# Patient Record
Sex: MALE | Race: WHITE | NOT HISPANIC OR LATINO | URBAN - METROPOLITAN AREA
[De-identification: names, ages, dates, MRNs, and addresses within clinical notes are randomized per-mention and may not be internally consistent; named-entity substitution may affect disease eponyms.]

---

## 2017-08-11 ENCOUNTER — INPATIENT (INPATIENT)
Facility: HOSPITAL | Age: 82
LOS: 3 days | Discharge: ANOTHER HOSPITAL | DRG: 305 | End: 2017-08-15
Attending: INTERNAL MEDICINE | Admitting: INTERNAL MEDICINE
Payer: MEDICARE

## 2017-08-11 VITALS
TEMPERATURE: 98 F | HEIGHT: 72 IN | HEART RATE: 62 BPM | RESPIRATION RATE: 18 BRPM | SYSTOLIC BLOOD PRESSURE: 160 MMHG | WEIGHT: 257.06 LBS | DIASTOLIC BLOOD PRESSURE: 77 MMHG | OXYGEN SATURATION: 98 %

## 2017-08-11 DIAGNOSIS — R06.09 OTHER FORMS OF DYSPNEA: ICD-10-CM

## 2017-08-11 DIAGNOSIS — I10 ESSENTIAL (PRIMARY) HYPERTENSION: ICD-10-CM

## 2017-08-11 LAB
ALBUMIN SERPL ELPH-MCNC: 3.6 G/DL — SIGNIFICANT CHANGE UP (ref 3.5–5)
ALP SERPL-CCNC: 74 U/L — SIGNIFICANT CHANGE UP (ref 40–120)
ALT FLD-CCNC: 20 U/L DA — SIGNIFICANT CHANGE UP (ref 10–60)
ANION GAP SERPL CALC-SCNC: 5 MMOL/L — SIGNIFICANT CHANGE UP (ref 5–17)
APPEARANCE UR: CLEAR — SIGNIFICANT CHANGE UP
AST SERPL-CCNC: 24 U/L — SIGNIFICANT CHANGE UP (ref 10–40)
BILIRUB SERPL-MCNC: 0.5 MG/DL — SIGNIFICANT CHANGE UP (ref 0.2–1.2)
BILIRUB UR-MCNC: NEGATIVE — SIGNIFICANT CHANGE UP
BUN SERPL-MCNC: 18 MG/DL — SIGNIFICANT CHANGE UP (ref 7–18)
CALCIUM SERPL-MCNC: 8.7 MG/DL — SIGNIFICANT CHANGE UP (ref 8.4–10.5)
CHLORIDE SERPL-SCNC: 108 MMOL/L — SIGNIFICANT CHANGE UP (ref 96–108)
CO2 SERPL-SCNC: 27 MMOL/L — SIGNIFICANT CHANGE UP (ref 22–31)
COLOR SPEC: YELLOW — SIGNIFICANT CHANGE UP
CREAT SERPL-MCNC: 1.42 MG/DL — HIGH (ref 0.5–1.3)
D DIMER BLD IA.RAPID-MCNC: 208 NG/ML DDU — SIGNIFICANT CHANGE UP
DIFF PNL FLD: NEGATIVE — SIGNIFICANT CHANGE UP
GLUCOSE SERPL-MCNC: 130 MG/DL — HIGH (ref 70–99)
GLUCOSE UR QL: NEGATIVE — SIGNIFICANT CHANGE UP
HCT VFR BLD CALC: 37.8 % — LOW (ref 39–50)
HGB BLD-MCNC: 12.4 G/DL — LOW (ref 13–17)
KETONES UR-MCNC: NEGATIVE — SIGNIFICANT CHANGE UP
LEUKOCYTE ESTERASE UR-ACNC: ABNORMAL
MCHC RBC-ENTMCNC: 30.2 PG — SIGNIFICANT CHANGE UP (ref 27–34)
MCHC RBC-ENTMCNC: 32.8 GM/DL — SIGNIFICANT CHANGE UP (ref 32–36)
MCV RBC AUTO: 92.2 FL — SIGNIFICANT CHANGE UP (ref 80–100)
NITRITE UR-MCNC: NEGATIVE — SIGNIFICANT CHANGE UP
NT-PROBNP SERPL-SCNC: 549 PG/ML — HIGH (ref 0–450)
PH UR: 6.5 — SIGNIFICANT CHANGE UP (ref 5–8)
PLATELET # BLD AUTO: 172 K/UL — SIGNIFICANT CHANGE UP (ref 150–400)
POTASSIUM SERPL-MCNC: 4.3 MMOL/L — SIGNIFICANT CHANGE UP (ref 3.5–5.3)
POTASSIUM SERPL-SCNC: 4.3 MMOL/L — SIGNIFICANT CHANGE UP (ref 3.5–5.3)
PROT SERPL-MCNC: 7.2 G/DL — SIGNIFICANT CHANGE UP (ref 6–8.3)
PROT UR-MCNC: NEGATIVE — SIGNIFICANT CHANGE UP
RBC # BLD: 4.1 M/UL — LOW (ref 4.2–5.8)
RBC # FLD: 12.9 % — SIGNIFICANT CHANGE UP (ref 10.3–14.5)
SODIUM SERPL-SCNC: 140 MMOL/L — SIGNIFICANT CHANGE UP (ref 135–145)
SP GR SPEC: 1.01 — SIGNIFICANT CHANGE UP (ref 1.01–1.02)
TROPONIN I SERPL-MCNC: 0.02 NG/ML — SIGNIFICANT CHANGE UP (ref 0–0.04)
TROPONIN I SERPL-MCNC: 0.02 NG/ML — SIGNIFICANT CHANGE UP (ref 0–0.04)
UROBILINOGEN FLD QL: NEGATIVE — SIGNIFICANT CHANGE UP
WBC # BLD: 6.8 K/UL — SIGNIFICANT CHANGE UP (ref 3.8–10.5)
WBC # FLD AUTO: 6.8 K/UL — SIGNIFICANT CHANGE UP (ref 3.8–10.5)

## 2017-08-11 PROCEDURE — 71020: CPT | Mod: 26

## 2017-08-11 PROCEDURE — 99285 EMERGENCY DEPT VISIT HI MDM: CPT

## 2017-08-11 RX ORDER — DEXTROSE 50 % IN WATER 50 %
25 SYRINGE (ML) INTRAVENOUS ONCE
Qty: 0 | Refills: 0 | Status: DISCONTINUED | OUTPATIENT
Start: 2017-08-11 | End: 2017-08-15

## 2017-08-11 RX ORDER — INSULIN LISPRO 100/ML
VIAL (ML) SUBCUTANEOUS
Qty: 0 | Refills: 0 | Status: DISCONTINUED | OUTPATIENT
Start: 2017-08-11 | End: 2017-08-12

## 2017-08-11 RX ORDER — SODIUM CHLORIDE 9 MG/ML
1000 INJECTION INTRAMUSCULAR; INTRAVENOUS; SUBCUTANEOUS ONCE
Qty: 0 | Refills: 0 | Status: COMPLETED | OUTPATIENT
Start: 2017-08-11 | End: 2017-08-11

## 2017-08-11 RX ORDER — DEXTROSE 50 % IN WATER 50 %
1 SYRINGE (ML) INTRAVENOUS ONCE
Qty: 0 | Refills: 0 | Status: DISCONTINUED | OUTPATIENT
Start: 2017-08-11 | End: 2017-08-15

## 2017-08-11 RX ORDER — SODIUM CHLORIDE 9 MG/ML
1000 INJECTION, SOLUTION INTRAVENOUS
Qty: 0 | Refills: 0 | Status: DISCONTINUED | OUTPATIENT
Start: 2017-08-11 | End: 2017-08-15

## 2017-08-11 RX ORDER — FUROSEMIDE 40 MG
40 TABLET ORAL ONCE
Qty: 0 | Refills: 0 | Status: COMPLETED | OUTPATIENT
Start: 2017-08-11 | End: 2017-08-11

## 2017-08-11 RX ORDER — DEXTROSE 50 % IN WATER 50 %
12.5 SYRINGE (ML) INTRAVENOUS ONCE
Qty: 0 | Refills: 0 | Status: DISCONTINUED | OUTPATIENT
Start: 2017-08-11 | End: 2017-08-15

## 2017-08-11 RX ORDER — GLUCAGON INJECTION, SOLUTION 0.5 MG/.1ML
1 INJECTION, SOLUTION SUBCUTANEOUS ONCE
Qty: 0 | Refills: 0 | Status: DISCONTINUED | OUTPATIENT
Start: 2017-08-11 | End: 2017-08-15

## 2017-08-11 RX ADMIN — SODIUM CHLORIDE 4000 MILLILITER(S): 9 INJECTION INTRAMUSCULAR; INTRAVENOUS; SUBCUTANEOUS at 13:20

## 2017-08-11 NOTE — H&P ADULT - HISTORY OF PRESENT ILLNESS
82 year old Male from home lives alone, ambulates independently, with PMH Diabetes, CAD S/p stent? CABG? in Nov 2011, Obesity, OA, macular degeneration came with c/o SOB on exertion since one month ago. He reports that He become SOB walking one block,  His exertional SOB was progressively getting worse since one week, so he called his physician who told him to go to ER. Dyspnea is aggravated by lifting grocery bags and walking more then a block, relieved by sitting down. He denies Orthopnea, PND, chest pain, dizziness, recent URI, heartburn, fever. He also c/o chronic B/L leg swelling and knee pain due to Osteoarthritis. He reported that he goes to his cardiologist Dr. Rose olivera in [facility] who scheduled him for Nuclear Stress test but he cancelled that test as it was expensive. He did not know whether his cardiologist has done an Echo and EF. In ED, EKG was done which shows NS-T wave changes with occasional PVC. CXR was done "Focal opacity in the periphery of the left lower lobe along the lateral aspect of the left hemidiaphragm not well visualized on the lateral image which could represent focal scarring/atelectasis. Otherwise, clear lungs."   When seen in ED he was lying comfortably, He denies SOB currently.

## 2017-08-11 NOTE — ED ADULT NURSE NOTE - OBJECTIVE STATEMENT
pt came in ambulatory  with c/o generalized weakness with shortness of breath pt alert oriented x 3 with no signs of any discomfort wife on bedside with equal chest expansion noted. seen byMD with orders made and carried out pt came in ambulatory  with c/o generalized weakness with shortness of breath pt alert oriented x 3 with no signs of any discomfort  with equal chest expansion noted. seen byMD with orders made and carried out

## 2017-08-11 NOTE — ED PROVIDER NOTE - OBJECTIVE STATEMENT
83 y/o male with PMHx of HLD, DM, and Obesity presents to the ED c/o 1 week SOB on exertion. Pt has not had a stress test. Pt denies infectious symptoms, fever, chills, CP, cough, diaphoresis, or any other complaints. NKDA. PMD Dr. Hernandez. 83 y/o male with PMHx of HLD, DM, and Obesity presents to the ED c/o 1 week SOB on exertion. No chest pain.  Pt has not had a stress test. Requests to be assigned to new cardiologist.  Pt denies infectious symptoms, fever, chills, CP, cough, diaphoresis, or any other complaints. NKDA. PMD Dr. Hernandez.

## 2017-08-11 NOTE — H&P ADULT - NSHPPHYSICALEXAM_GEN_ALL_CORE
Vital Signs Last 24 Hrs  T(C): 36.5 (11 Aug 2017 22:43), Max: 36.9 (11 Aug 2017 12:21)  T(F): 97.7 (11 Aug 2017 22:43), Max: 98.5 (11 Aug 2017 12:21)  HR: 54 (11 Aug 2017 22:43) (54 - 66)  BP: 147/75 (11 Aug 2017 22:43) (147/75 - 162/86)  BP(mean): --  RR: 17 (11 Aug 2017 22:43) (17 - 18)  SpO2: 98% (11 Aug 2017 22:43) (97% - 98%)

## 2017-08-11 NOTE — H&P ADULT - ASSESSMENT
82 year old Male from home lives alone, ambulates independently, with PMH Diabetes, CAD S/p stent? CABG? in Nov 2011, Obesity, OA, macular degeneration came with c/o SOB on exertion since one month ago.  In ED, EKG was done which shows NS-T wave changes with occasional PVC. CXR was done "Focal opacity in the periphery of the left lower lobe along the lateral aspect of the left hemidiaphragm not well visualized on the lateral image which could represent focal scarring/atelectasis. Otherwise, clear lungs." When seen in ED he was lying comfortably, He denies SOB currently. Admitted to Telemetry to R/O MI given patient's risk factor of CAD and CHF

## 2017-08-11 NOTE — ED ADULT NURSE REASSESSMENT NOTE - NS ED NURSE REASSESS COMMENT FT1
received pt aox3 not in any distress
1950- pt states he is feeling better denies any shortness of breath at this time. denies any cp nor discomfort

## 2017-08-11 NOTE — H&P ADULT - PROBLEM SELECTOR PLAN 1
-Likely due to Heart failure/ Ischemic Heart Disease.   -ProBNP is 549, Trop x 2 negative. Will trend Troponin,   -EKG: NST- Twave changes, some PVCS,  f/u B12, TSH  -D/dimers negative, U/S Lower extremity for DVT pending   consulted

## 2017-08-11 NOTE — ED PROVIDER NOTE - MEDICAL DECISION MAKING DETAILS
dyspnea on exertion, mild elev in bnp  trop neg. will admit for possible stress test, echo and further mgmt

## 2017-08-12 DIAGNOSIS — R06.09 OTHER FORMS OF DYSPNEA: ICD-10-CM

## 2017-08-12 DIAGNOSIS — E11.9 TYPE 2 DIABETES MELLITUS WITHOUT COMPLICATIONS: ICD-10-CM

## 2017-08-12 DIAGNOSIS — E78.5 HYPERLIPIDEMIA, UNSPECIFIED: ICD-10-CM

## 2017-08-12 DIAGNOSIS — I10 ESSENTIAL (PRIMARY) HYPERTENSION: ICD-10-CM

## 2017-08-12 DIAGNOSIS — Z29.9 ENCOUNTER FOR PROPHYLACTIC MEASURES, UNSPECIFIED: ICD-10-CM

## 2017-08-12 LAB
ANION GAP SERPL CALC-SCNC: 9 MMOL/L — SIGNIFICANT CHANGE UP (ref 5–17)
BUN SERPL-MCNC: 18 MG/DL — SIGNIFICANT CHANGE UP (ref 7–18)
CALCIUM SERPL-MCNC: 9.3 MG/DL — SIGNIFICANT CHANGE UP (ref 8.4–10.5)
CHLORIDE SERPL-SCNC: 108 MMOL/L — SIGNIFICANT CHANGE UP (ref 96–108)
CO2 SERPL-SCNC: 25 MMOL/L — SIGNIFICANT CHANGE UP (ref 22–31)
CREAT SERPL-MCNC: 1.28 MG/DL — SIGNIFICANT CHANGE UP (ref 0.5–1.3)
GLUCOSE SERPL-MCNC: 107 MG/DL — HIGH (ref 70–99)
HBA1C BLD-MCNC: 6.4 % — HIGH (ref 4–5.6)
HCT VFR BLD CALC: 38 % — LOW (ref 39–50)
HGB BLD-MCNC: 12.5 G/DL — LOW (ref 13–17)
MAGNESIUM SERPL-MCNC: 2.2 MG/DL — SIGNIFICANT CHANGE UP (ref 1.6–2.6)
MCHC RBC-ENTMCNC: 30.4 PG — SIGNIFICANT CHANGE UP (ref 27–34)
MCHC RBC-ENTMCNC: 33 GM/DL — SIGNIFICANT CHANGE UP (ref 32–36)
MCV RBC AUTO: 92.2 FL — SIGNIFICANT CHANGE UP (ref 80–100)
OSMOLALITY UR: 442 MOS/KG — SIGNIFICANT CHANGE UP (ref 50–1200)
PHOSPHATE SERPL-MCNC: 3.3 MG/DL — SIGNIFICANT CHANGE UP (ref 2.5–4.5)
PLATELET # BLD AUTO: 172 K/UL — SIGNIFICANT CHANGE UP (ref 150–400)
POTASSIUM SERPL-MCNC: 4 MMOL/L — SIGNIFICANT CHANGE UP (ref 3.5–5.3)
POTASSIUM SERPL-SCNC: 4 MMOL/L — SIGNIFICANT CHANGE UP (ref 3.5–5.3)
RBC # BLD: 4.12 M/UL — LOW (ref 4.2–5.8)
RBC # FLD: 12.7 % — SIGNIFICANT CHANGE UP (ref 10.3–14.5)
SODIUM SERPL-SCNC: 142 MMOL/L — SIGNIFICANT CHANGE UP (ref 135–145)
SODIUM UR-SCNC: 82 MMOL/L — SIGNIFICANT CHANGE UP (ref 40–220)
TROPONIN I SERPL-MCNC: 0.03 NG/ML — SIGNIFICANT CHANGE UP (ref 0–0.04)
TSH SERPL-MCNC: 2.79 UU/ML — SIGNIFICANT CHANGE UP (ref 0.34–4.82)
VIT B12 SERPL-MCNC: 356 PG/ML — SIGNIFICANT CHANGE UP (ref 243–894)
WBC # BLD: 7.7 K/UL — SIGNIFICANT CHANGE UP (ref 3.8–10.5)
WBC # FLD AUTO: 7.7 K/UL — SIGNIFICANT CHANGE UP (ref 3.8–10.5)

## 2017-08-12 PROCEDURE — 93970 EXTREMITY STUDY: CPT | Mod: 26

## 2017-08-12 RX ORDER — LOSARTAN POTASSIUM 100 MG/1
50 TABLET, FILM COATED ORAL DAILY
Qty: 0 | Refills: 0 | Status: DISCONTINUED | OUTPATIENT
Start: 2017-08-12 | End: 2017-08-15

## 2017-08-12 RX ORDER — ATORVASTATIN CALCIUM 80 MG/1
40 TABLET, FILM COATED ORAL AT BEDTIME
Qty: 0 | Refills: 0 | Status: DISCONTINUED | OUTPATIENT
Start: 2017-08-12 | End: 2017-08-15

## 2017-08-12 RX ORDER — INSULIN GLARGINE 100 [IU]/ML
10 INJECTION, SOLUTION SUBCUTANEOUS AT BEDTIME
Qty: 0 | Refills: 0 | Status: DISCONTINUED | OUTPATIENT
Start: 2017-08-12 | End: 2017-08-15

## 2017-08-12 RX ORDER — LOSARTAN POTASSIUM 100 MG/1
25 TABLET, FILM COATED ORAL DAILY
Qty: 0 | Refills: 0 | Status: DISCONTINUED | OUTPATIENT
Start: 2017-08-12 | End: 2017-08-12

## 2017-08-12 RX ORDER — SIMVASTATIN 20 MG/1
10 TABLET, FILM COATED ORAL AT BEDTIME
Qty: 0 | Refills: 0 | Status: DISCONTINUED | OUTPATIENT
Start: 2017-08-12 | End: 2017-08-12

## 2017-08-12 RX ORDER — ISOSORBIDE MONONITRATE 60 MG/1
30 TABLET, EXTENDED RELEASE ORAL DAILY
Qty: 0 | Refills: 0 | Status: DISCONTINUED | OUTPATIENT
Start: 2017-08-12 | End: 2017-08-15

## 2017-08-12 RX ORDER — ASPIRIN/CALCIUM CARB/MAGNESIUM 324 MG
81 TABLET ORAL DAILY
Qty: 0 | Refills: 0 | Status: DISCONTINUED | OUTPATIENT
Start: 2017-08-12 | End: 2017-08-15

## 2017-08-12 RX ORDER — HYDROCHLOROTHIAZIDE 25 MG
25 TABLET ORAL DAILY
Qty: 0 | Refills: 0 | Status: DISCONTINUED | OUTPATIENT
Start: 2017-08-12 | End: 2017-08-15

## 2017-08-12 RX ORDER — INSULIN LISPRO 100/ML
VIAL (ML) SUBCUTANEOUS
Qty: 0 | Refills: 0 | Status: DISCONTINUED | OUTPATIENT
Start: 2017-08-12 | End: 2017-08-15

## 2017-08-12 RX ORDER — ENOXAPARIN SODIUM 100 MG/ML
40 INJECTION SUBCUTANEOUS DAILY
Qty: 0 | Refills: 0 | Status: DISCONTINUED | OUTPATIENT
Start: 2017-08-12 | End: 2017-08-15

## 2017-08-12 RX ADMIN — Medication 81 MILLIGRAM(S): at 13:13

## 2017-08-12 RX ADMIN — ENOXAPARIN SODIUM 40 MILLIGRAM(S): 100 INJECTION SUBCUTANEOUS at 13:13

## 2017-08-12 RX ADMIN — INSULIN GLARGINE 10 UNIT(S): 100 INJECTION, SOLUTION SUBCUTANEOUS at 21:36

## 2017-08-12 RX ADMIN — ISOSORBIDE MONONITRATE 30 MILLIGRAM(S): 60 TABLET, EXTENDED RELEASE ORAL at 13:13

## 2017-08-12 RX ADMIN — ATORVASTATIN CALCIUM 40 MILLIGRAM(S): 80 TABLET, FILM COATED ORAL at 21:36

## 2017-08-12 RX ADMIN — LOSARTAN POTASSIUM 25 MILLIGRAM(S): 100 TABLET, FILM COATED ORAL at 05:54

## 2017-08-12 NOTE — CONSULT NOTE ADULT - SUBJECTIVE AND OBJECTIVE BOX
5N: CARD ATTEND CONSULT  8:30AM      Orig  HPI:  82 year old Male from home lives alone, ambulates independently, with PMH Diabetes, CAD S/p CABG? in 2011, [place],  came with c/o SOB on exertion since one month ago. His exertional SOB was progressively getting worse since one week, so he called his physician who told him to go to ER. Dyspnea is aggravated by lifting grocery bags and walking more then a block, relieved by sitting down. He denies Orthopnea, PND, chest pain, dizziness, syncope. He also c/o chronic B/L leg swelling. He reported that he goes to his cardiologist Dr. Graff who scheduled him for Nuclear Stress test but he cancelled that test as it was expensive.   When seen in ED he was lying comfortably, He denies SOB currently. (11 Aug 2017 23:28)    PMH: ASHD, HTN, IDDM, BPH    PRE-ADM MEDS:  Losartan, Simvastatin, Insulin, ASA    SURGERIES: CABG    ROS: non-contributory except as outlined above.      EXAM: alert , in NAD    Vital Signs Last 24 Hrs  T(C): 37 (12 Aug 2017 07:38), Max: 37.2 (12 Aug 2017 04:38)  T(F): 98.6 (12 Aug 2017 07:38), Max: 99 (12 Aug 2017 04:38)  HR: 60 (12 Aug 2017 07:40) (54 - 66)  BP: 186/77 (12 Aug 2017 07:40) (132/60 - 186/77)  RR: 16 (12 Aug 2017 07:38) (16 - 18)  SpO2: 97% (12 Aug 2017 07:38) (97% - 99%)  Daily Height in cm: 182.88 (11 Aug 2017 12:21)    Daily Weight in k.9 (12 Aug 2017 04:38)    NECK: no JVD/HJR, nl c. pulses w/o bruits LUNGS: clear  COR: nl s1/s2, distant 2/6 KATELYN over AV, no g, rub  ABD: nl BS, soft, not tender, no oranomeg/masses  LE: bilat trace ankle/pedal edema, peripheral pulses not palpable    EKG:  NSR, one PVC, no ischemic ST-T changes.    CM:  NSR, no ectopy    RAD RESULTS:  CxR: < from: Xray Chest 2 Views PA/Lat (17 @ 15:15) >  Focal opacity in the periphery of the left lower lobe along the lateral   aspect of the left hemidiaphragm not well visualizedon the lateral image   which could represent focal scarring/atelectasis. Otherwise, clear lungs.      LABS:                         12.5   7.7   )-----------( 172      ( 12 Aug 2017 07:23 )             38.0     08-12    142  |  108  |  18  ----------------------------<  107<H>  4.0   |  25  |  1.28    Ca    9.3      12 Aug 2017 07:23  Phos  3.3     08-12  Mg     2.2     -12    TPro  7.2  /  Alb  3.6  /  TBili  0.5  /  DBili  x   /  AST  24  /  ALT  20  /  AlkPhos  74  08-11    CARDIAC MARKERS ( 12 Aug 2017 07:23 )  0.028 ng/mL / x     / x     / x     / x      CARDIAC MARKERS ( 11 Aug 2017 22:01 )  0.023 ng/mL / x     / x     / x     / x      CARDIAC MARKERS ( 11 Aug 2017 13:32 )  0.017 ng/mL / x     / x     / x     / x

## 2017-08-12 NOTE — CONSULT NOTE ADULT - ASSESSMENT
1. Exertional sx sukggestive of coronary insufficiency,.  2. Established CAD, s/p CABG >> so far by serial enzymes no indication ACS.  3. Hypertensive  4. By CxR, pro-BNP not in clinical CHF.  5. AODM    Plan:    1. BP control >> Losartan, diuretic.  2. Anti -ischemic long acting nitrates, eventually low dose beta blocker.  3. More aggressive statin dosing, ASA.  4. 2Decho  5. Will probably need diagnostic pharm nuclear ST during this adm  6. The house staff will attempt to contact pt's outside cardiologist.    MEDICATIONS  (STANDING):  dextrose 5%. 1000 milliLiter(s) (50 mL/Hr) IV Continuous <Continuous>  dextrose 50% Injectable 12.5 Gram(s) IV Push once  dextrose 50% Injectable 25 Gram(s) IV Push once  dextrose 50% Injectable 25 Gram(s) IV Push once  losartan 50 milliGRAM(s) Oral daily  insulin glargine Injectable (LANTUS) 10 Unit(s) SubCutaneous at bedtime  insulin lispro (HumaLOG) corrective regimen sliding scale   SubCutaneous three times a day before meals  aspirin enteric coated 81 milliGRAM(s) Oral daily  enoxaparin Injectable 40 milliGRAM(s) SubCutaneous daily  isosorbide   mononitrate ER Tablet (IMDUR) 30 milliGRAM(s) Oral daily  hydrochlorothiazide 25 milliGRAM(s) Oral daily  atorvastatin 40 milliGRAM(s) Oral at bedtime

## 2017-08-13 LAB
ANION GAP SERPL CALC-SCNC: 8 MMOL/L — SIGNIFICANT CHANGE UP (ref 5–17)
BUN SERPL-MCNC: 19 MG/DL — HIGH (ref 7–18)
CALCIUM SERPL-MCNC: 8.8 MG/DL — SIGNIFICANT CHANGE UP (ref 8.4–10.5)
CHLORIDE SERPL-SCNC: 108 MMOL/L — SIGNIFICANT CHANGE UP (ref 96–108)
CO2 SERPL-SCNC: 26 MMOL/L — SIGNIFICANT CHANGE UP (ref 22–31)
CREAT SERPL-MCNC: 1.23 MG/DL — SIGNIFICANT CHANGE UP (ref 0.5–1.3)
GLUCOSE SERPL-MCNC: 112 MG/DL — HIGH (ref 70–99)
HCT VFR BLD CALC: 36.1 % — LOW (ref 39–50)
HGB BLD-MCNC: 11.7 G/DL — LOW (ref 13–17)
MAGNESIUM SERPL-MCNC: 2.3 MG/DL — SIGNIFICANT CHANGE UP (ref 1.6–2.6)
MCHC RBC-ENTMCNC: 29.3 PG — SIGNIFICANT CHANGE UP (ref 27–34)
MCHC RBC-ENTMCNC: 32.4 GM/DL — SIGNIFICANT CHANGE UP (ref 32–36)
MCV RBC AUTO: 90.4 FL — SIGNIFICANT CHANGE UP (ref 80–100)
PHOSPHATE SERPL-MCNC: 3.6 MG/DL — SIGNIFICANT CHANGE UP (ref 2.5–4.5)
PLATELET # BLD AUTO: 185 K/UL — SIGNIFICANT CHANGE UP (ref 150–400)
POTASSIUM SERPL-MCNC: 3.9 MMOL/L — SIGNIFICANT CHANGE UP (ref 3.5–5.3)
POTASSIUM SERPL-SCNC: 3.9 MMOL/L — SIGNIFICANT CHANGE UP (ref 3.5–5.3)
RBC # BLD: 4 M/UL — LOW (ref 4.2–5.8)
RBC # FLD: 12.8 % — SIGNIFICANT CHANGE UP (ref 10.3–14.5)
SODIUM SERPL-SCNC: 142 MMOL/L — SIGNIFICANT CHANGE UP (ref 135–145)
WBC # BLD: 8.2 K/UL — SIGNIFICANT CHANGE UP (ref 3.8–10.5)
WBC # FLD AUTO: 8.2 K/UL — SIGNIFICANT CHANGE UP (ref 3.8–10.5)

## 2017-08-13 RX ORDER — LANOLIN ALCOHOL/MO/W.PET/CERES
3 CREAM (GRAM) TOPICAL AT BEDTIME
Qty: 0 | Refills: 0 | Status: COMPLETED | OUTPATIENT
Start: 2017-08-13 | End: 2017-08-13

## 2017-08-13 RX ADMIN — ATORVASTATIN CALCIUM 40 MILLIGRAM(S): 80 TABLET, FILM COATED ORAL at 22:12

## 2017-08-13 RX ADMIN — ENOXAPARIN SODIUM 40 MILLIGRAM(S): 100 INJECTION SUBCUTANEOUS at 12:03

## 2017-08-13 RX ADMIN — Medication 3 MILLIGRAM(S): at 03:17

## 2017-08-13 RX ADMIN — INSULIN GLARGINE 10 UNIT(S): 100 INJECTION, SOLUTION SUBCUTANEOUS at 22:12

## 2017-08-13 RX ADMIN — Medication 81 MILLIGRAM(S): at 12:03

## 2017-08-13 RX ADMIN — ISOSORBIDE MONONITRATE 30 MILLIGRAM(S): 60 TABLET, EXTENDED RELEASE ORAL at 12:03

## 2017-08-13 NOTE — DISCHARGE NOTE ADULT - PATIENT PORTAL LINK FT
“You can access the [employer] Patient Portal, offered by [facility], by registering with the following website: [email or web address]”

## 2017-08-13 NOTE — DISCHARGE NOTE ADULT - CARE PLAN
Principal Discharge DX:	Dyspnea on exertion  Goal:	cardiac cath due to evidence of ischemia  Instructions for follow-up, activity and diet:	transfer to ___ facility for cardiac catheterization  Secondary Diagnosis:	DM (diabetes mellitus)  Goal:	HbA1c < 7.0  Instructions for follow-up, activity and diet:	continue with lantus 10 units bedtime and hiss  monitor accuchecks  Secondary Diagnosis:	HTN (hypertension)  Goal:	BP < 140/90  Instructions for follow-up, activity and diet:	c/w BP medications  Secondary Diagnosis:	Dyslipidemia  Goal:	LDL< 70  Instructions for follow-up, activity and diet:	f/u lipid panel

## 2017-08-13 NOTE — DISCHARGE NOTE ADULT - HOSPITAL COURSE
82 year old Male from home lives alone, ambulates independently, with PMH Diabetes, CAD S/p stent? CABG? in Nov 2011, Obesity, OA, macular degeneration came with c/o SOB on exertion since one month ago. He reports that He become SOB walking one block,  His exertional SOB was progressively getting worse since one week, so he called his physician who told him to go to ER. Dyspnea is aggravated by lifting grocery bags and walking more then a block, relieved by sitting down. He denies Orthopnea. He reported that he goes to his cardiologist Dr. Rose olivera in [facility] who scheduled him for Nuclear Stress test but he cancelled that test as it was expensive. He did not know whether his cardiologist has done an Echo and EF. CXR was done "Focal opacity in the periphery of the left lower lobe along the lateral aspect of the left hemidiaphragm not well visualized on the lateral image which could represent focal scarring/atelectasis. Otherwise, clear lungs. Pt was admitted for Heart failure/ Ischemic Heart Disease.   -ProBNP is 549, Trop x 3 negative EKG: NST- Twave changes, some PVCS, D/dimers negative, U/S Doppler b/l Lower extremity ruled out DVT , echo shows EF 40-45% with grade 3 diastolic dysfunction, moderately incread RV systolic pressure. NST revealed several areas of reversible ischemia.   *Negative ECG evidence of ischemia after IV  administration of Regadenoson.  * Myocardial Perfusion SPECT results are abnormal.  * There is a medium sized, mild to moderate defect in the  inferior wall that is partially reversible, suggestive of  infarction with moderate steffi-infarct ischemia.  There is  a small, mild defect in the anterior wall that is  reversible, suggestive of ischemia.  There is a small,  mild to moderate defect in the lateral wall that is  partially reversible, suggestive of infarction with  mild-moderate steffi-infarct ischemia.  * Post-stress resting myocardial perfusion gated SPECT  imaging was performed (LVEF = 51 %).    Pt planned for cardiac cath as per Dr. Linn, to arrange transfer to facility and accepting physician.

## 2017-08-13 NOTE — DISCHARGE NOTE ADULT - PLAN OF CARE
cardiac cath due to evidence of ischemia transfer to ___ facility for cardiac catheterization HbA1c < 7.0 continue with lantus 10 units bedtime and hiss  monitor accuchecks BP < 140/90 c/w BP medications LDL< 70 f/u lipid panel

## 2017-08-14 LAB
ANION GAP SERPL CALC-SCNC: 10 MMOL/L — SIGNIFICANT CHANGE UP (ref 5–17)
BUN SERPL-MCNC: 21 MG/DL — HIGH (ref 7–18)
CALCIUM SERPL-MCNC: 9 MG/DL — SIGNIFICANT CHANGE UP (ref 8.4–10.5)
CHLORIDE SERPL-SCNC: 110 MMOL/L — HIGH (ref 96–108)
CO2 SERPL-SCNC: 24 MMOL/L — SIGNIFICANT CHANGE UP (ref 22–31)
CREAT SERPL-MCNC: 1.23 MG/DL — SIGNIFICANT CHANGE UP (ref 0.5–1.3)
GLUCOSE SERPL-MCNC: 105 MG/DL — HIGH (ref 70–99)
HCT VFR BLD CALC: 37.4 % — LOW (ref 39–50)
HGB BLD-MCNC: 12.4 G/DL — LOW (ref 13–17)
MAGNESIUM SERPL-MCNC: 2.3 MG/DL — SIGNIFICANT CHANGE UP (ref 1.6–2.6)
MCHC RBC-ENTMCNC: 30.6 PG — SIGNIFICANT CHANGE UP (ref 27–34)
MCHC RBC-ENTMCNC: 33.1 GM/DL — SIGNIFICANT CHANGE UP (ref 32–36)
MCV RBC AUTO: 92.3 FL — SIGNIFICANT CHANGE UP (ref 80–100)
PHOSPHATE SERPL-MCNC: 3.4 MG/DL — SIGNIFICANT CHANGE UP (ref 2.5–4.5)
PLATELET # BLD AUTO: 168 K/UL — SIGNIFICANT CHANGE UP (ref 150–400)
POTASSIUM SERPL-MCNC: 3.9 MMOL/L — SIGNIFICANT CHANGE UP (ref 3.5–5.3)
POTASSIUM SERPL-SCNC: 3.9 MMOL/L — SIGNIFICANT CHANGE UP (ref 3.5–5.3)
RBC # BLD: 4.05 M/UL — LOW (ref 4.2–5.8)
RBC # FLD: 12.9 % — SIGNIFICANT CHANGE UP (ref 10.3–14.5)
SODIUM SERPL-SCNC: 144 MMOL/L — SIGNIFICANT CHANGE UP (ref 135–145)
WBC # BLD: 7 K/UL — SIGNIFICANT CHANGE UP (ref 3.8–10.5)
WBC # FLD AUTO: 7 K/UL — SIGNIFICANT CHANGE UP (ref 3.8–10.5)

## 2017-08-14 RX ADMIN — Medication 81 MILLIGRAM(S): at 12:30

## 2017-08-14 RX ADMIN — INSULIN GLARGINE 10 UNIT(S): 100 INJECTION, SOLUTION SUBCUTANEOUS at 22:21

## 2017-08-14 RX ADMIN — Medication 25 MILLIGRAM(S): at 06:26

## 2017-08-14 RX ADMIN — ISOSORBIDE MONONITRATE 30 MILLIGRAM(S): 60 TABLET, EXTENDED RELEASE ORAL at 12:30

## 2017-08-14 RX ADMIN — ATORVASTATIN CALCIUM 40 MILLIGRAM(S): 80 TABLET, FILM COATED ORAL at 22:20

## 2017-08-14 RX ADMIN — LOSARTAN POTASSIUM 50 MILLIGRAM(S): 100 TABLET, FILM COATED ORAL at 06:26

## 2017-08-14 RX ADMIN — ENOXAPARIN SODIUM 40 MILLIGRAM(S): 100 INJECTION SUBCUTANEOUS at 12:30

## 2017-08-14 NOTE — PROGRESS NOTE ADULT - PROBLEM SELECTOR PLAN 1
S/p one dose of Lasix. Denies SOB currently  - Started imdur, HCTZ atorvastatin  Echo - 40-45% EF with grade 3 diastolic dysfunction with increased RV systolic pressure moderate increased  NST - medium sized, mild to moderate defect in the inferior wall, partially reversible, suggestive of infarction with moderate steffi-infarct ischemia;  small, mild defect in the anterior wall that is reversible, suggestive of ischemia; small, mild to moderate defect in the lateral wall that is partially reversible, suggestive of infarction with mild-moderate steffi-infarct ischemia  Pt is for transfer for cardiac catheterization tomorrow AM, will speak with Dr. Linn regarding transferring facility and physician

## 2017-08-14 NOTE — CONSULT NOTE ADULT - SUBJECTIVE AND OBJECTIVE BOX
Requesting Physician : Dr. Marr     Reason for Consultation: CAD     HISTORY OF PRESENT ILLNESS: HPI:  82 year old Male from home lives alone, ambulates independently, with PMH Diabetes, CAD S/p CABG in Nov 2011 after cath showed severe LM disease, Obesity, OA, macular degeneration admitted with sob.  The pt. reports acosta for approximately one month.  He was scheduled for an outpatient NST in our office but did not make the appointment.  He denies chest pain, palpitations, orthopnea, le edema, or any other cardiac complaints.  He was ruled out for an MI.  Cardiology consulted to further evaluate.       PAST MEDICAL & SURGICAL HISTORY:  HTN (hypertension)  DM (diabetes mellitus)  Dyslipidemia  History of Obesity  No Past Surgical History          MEDICATIONS:  MEDICATIONS  (STANDING):  dextrose 5%. 1000 milliLiter(s) (50 mL/Hr) IV Continuous <Continuous>  dextrose 50% Injectable 12.5 Gram(s) IV Push once  dextrose 50% Injectable 25 Gram(s) IV Push once  dextrose 50% Injectable 25 Gram(s) IV Push once  insulin glargine Injectable (LANTUS) 10 Unit(s) SubCutaneous at bedtime  insulin lispro (HumaLOG) corrective regimen sliding scale   SubCutaneous three times a day before meals  aspirin enteric coated 81 milliGRAM(s) Oral daily  enoxaparin Injectable 40 milliGRAM(s) SubCutaneous daily  isosorbide   mononitrate ER Tablet (IMDUR) 30 milliGRAM(s) Oral daily  hydrochlorothiazide 25 milliGRAM(s) Oral daily  atorvastatin 40 milliGRAM(s) Oral at bedtime  losartan 50 milliGRAM(s) Oral daily      Allergies    No Known Allergies    Intolerances        FAMILY HISTORY:  No pertinent family history in first degree relatives    Non-contributary for premature coronary disease or sudden cardiac death    SOCIAL HISTORY:    [x ] Non-smoker  [ ] Smoker  [ ] Alcohol      REVIEW OF SYSTEMS:  [ ]chest pain  [x  ]shortness of breath  [  ]palpitations  [  ]syncope  [ ]near syncope [ ]upper extremity weakness   [ ] lower extremity weakness  [  ]diplopia  [  ]altered mental status   [  ]fevers  [ ]chills [ ]nausea  [ ]vomitting  [  ]dysphagia    [ ]abdominal pain  [ ]melena  [ ]BRBPR    [  ]epistaxis  [  ]rash    [ ]lower extremity edema        [x ] All others negative	  [ ] Unable to obtain    PHYSICAL EXAM:  T(C): 36.6 (08-14-17 @ 08:18), Max: 37 (08-13-17 @ 12:09)  HR: 64 (08-14-17 @ 08:18) (56 - 64)  BP: 122/77 (08-14-17 @ 08:18) (115/59 - 132/-)  RR: 18 (08-14-17 @ 08:18) (16 - 18)  SpO2: 99% (08-14-17 @ 08:18) (97% - 99%)  Wt(kg): --  I&O's Summary        HEENT:   Normal oral mucosa, PERRL, EOMI	  Lymphatic: No lymphadenopathy , no edema  Cardiovascular: Normal S1 S2, No JVD, No murmurs ,  Respiratory: Lungs clear to auscultation, normal effort 	  Gastrointestinal:  Soft, Non-tender, + BS	  Skin: No rashes, No ecchymoses, No cyanosis, warm to touch  Musculoskeletal: Normal range of motion, normal strength  Psychiatry:  Mood & affect appropriate      TELEMETRY: SR 	    ECG: SR 	  RADIOLOGY:  OTHER:     DIAGNOSTIC TESTING:  [ ] Echocardiogram:   [ ]  Catheterization:  [ ] Stress Test:    	  	  LABS:	 	  < from: Transthoracic Echocardiogram (08.12.17 @ 07:13) >  1. Normal mitral valve. Mild mitral regurgitation.  2. Calcified trileaflet aortic valve with normal opening.  3. Aortic Root: 4.0 cm.  4. Moderate left atrial enlargement.  5. Moderate concentric left ventricular hypertrophy.  6. Moderate global left ventricular dysfunction  (EF=40-45%).  7. Grade III diastolic dysfunction  8. Normal right atrium.  9. Normal right ventricular size and function.  10. RV systolic pressure is moderately increased (PASP 48mm  Hg).    < end of copied text >    CARDIAC MARKERS:  CARDIAC MARKERS ( 12 Aug 2017 07:23 )  0.028 ng/mL / x     / x     / x     / x      CARDIAC MARKERS ( 11 Aug 2017 22:01 )  0.023 ng/mL / x     / x     / x     / x      CARDIAC MARKERS ( 11 Aug 2017 13:32 )  0.017 ng/mL / x     / x     / x     / x                                  12.4   7.0   )-----------( 168      ( 14 Aug 2017 06:48 )             37.4     08-14    144  |  110<H>  |  21<H>  ----------------------------<  105<H>  3.9   |  24  |  1.23    Ca    9.0      14 Aug 2017 06:48  Phos  3.4     08-14  Mg     2.3     08-14      proBNP:   Lipid Profile:   HgA1c:   TSH:     ASSESSMENT/PLAN: 82 year old Male from home lives alone, ambulates independently, with PMH Diabetes, CAD S/p CABG in Nov 2011 after cath showed severe LM disease, Obesity, OA, macular degeneration admitted with sob.    -Would check NST to evaluate for progression of CAD  -If NST abnormal, recommend cardiac cath  -Continue with ASA  -Further workup pending above.     Temitope Nuñez MD  [facility]  [address], [address]  [place] [address]  office: [phone]  pager: [phone]

## 2017-08-15 ENCOUNTER — INPATIENT (INPATIENT)
Facility: HOSPITAL | Age: 82
LOS: 9 days | Discharge: ANOTHER HOSPITAL | End: 2017-08-25
Attending: INTERNAL MEDICINE | Admitting: INTERNAL MEDICINE
Payer: MEDICARE

## 2017-08-15 VITALS
HEART RATE: 71 BPM | SYSTOLIC BLOOD PRESSURE: 99 MMHG | WEIGHT: 248.68 LBS | RESPIRATION RATE: 18 BRPM | HEIGHT: 72 IN | DIASTOLIC BLOOD PRESSURE: 54 MMHG | OXYGEN SATURATION: 95 % | TEMPERATURE: 98 F

## 2017-08-15 VITALS — WEIGHT: 253.53 LBS

## 2017-08-15 DIAGNOSIS — R94.39 ABNORMAL RESULT OF OTHER CARDIOVASCULAR FUNCTION STUDY: ICD-10-CM

## 2017-08-15 DIAGNOSIS — Z98.49 CATARACT EXTRACTION STATUS, UNSPECIFIED EYE: Chronic | ICD-10-CM

## 2017-08-15 DIAGNOSIS — Z85.828 PERSONAL HISTORY OF OTHER MALIGNANT NEOPLASM OF SKIN: Chronic | ICD-10-CM

## 2017-08-15 DIAGNOSIS — I25.10 ATHEROSCLEROTIC HEART DISEASE OF NATIVE CORONARY ARTERY WITHOUT ANGINA PECTORIS: ICD-10-CM

## 2017-08-15 DIAGNOSIS — I48.91 UNSPECIFIED ATRIAL FIBRILLATION: ICD-10-CM

## 2017-08-15 DIAGNOSIS — Z95.1 PRESENCE OF AORTOCORONARY BYPASS GRAFT: Chronic | ICD-10-CM

## 2017-08-15 LAB
ALBUMIN SERPL ELPH-MCNC: 3.7 G/DL — SIGNIFICANT CHANGE UP (ref 3.5–5)
ALP SERPL-CCNC: 80 U/L — SIGNIFICANT CHANGE UP (ref 40–120)
ALT FLD-CCNC: 22 U/L DA — SIGNIFICANT CHANGE UP (ref 10–60)
ANION GAP SERPL CALC-SCNC: 8 MMOL/L — SIGNIFICANT CHANGE UP (ref 5–17)
APTT BLD: 37.2 SEC — SIGNIFICANT CHANGE UP (ref 27.5–37.4)
AST SERPL-CCNC: 18 U/L — SIGNIFICANT CHANGE UP (ref 10–40)
BILIRUB SERPL-MCNC: 0.5 MG/DL — SIGNIFICANT CHANGE UP (ref 0.2–1.2)
BUN SERPL-MCNC: 25 MG/DL — HIGH (ref 7–18)
CALCIUM SERPL-MCNC: 9.4 MG/DL — SIGNIFICANT CHANGE UP (ref 8.4–10.5)
CHLORIDE SERPL-SCNC: 107 MMOL/L — SIGNIFICANT CHANGE UP (ref 96–108)
CO2 SERPL-SCNC: 27 MMOL/L — SIGNIFICANT CHANGE UP (ref 22–31)
CREAT SERPL-MCNC: 1.36 MG/DL — HIGH (ref 0.5–1.3)
GLUCOSE SERPL-MCNC: 116 MG/DL — HIGH (ref 70–99)
HCT VFR BLD CALC: 43.1 % — SIGNIFICANT CHANGE UP (ref 39–50)
HGB BLD-MCNC: 14.1 G/DL — SIGNIFICANT CHANGE UP (ref 13–17)
INR BLD: 1.04 RATIO — SIGNIFICANT CHANGE UP (ref 0.88–1.16)
MCHC RBC-ENTMCNC: 29.4 PG — SIGNIFICANT CHANGE UP (ref 27–34)
MCHC RBC-ENTMCNC: 32.7 GM/DL — SIGNIFICANT CHANGE UP (ref 32–36)
MCV RBC AUTO: 89.7 FL — SIGNIFICANT CHANGE UP (ref 80–100)
PLATELET # BLD AUTO: 214 K/UL — SIGNIFICANT CHANGE UP (ref 150–400)
POTASSIUM SERPL-MCNC: 3.9 MMOL/L — SIGNIFICANT CHANGE UP (ref 3.5–5.3)
POTASSIUM SERPL-SCNC: 3.9 MMOL/L — SIGNIFICANT CHANGE UP (ref 3.5–5.3)
PROT SERPL-MCNC: 7.2 G/DL — SIGNIFICANT CHANGE UP (ref 6–8.3)
PROTHROM AB SERPL-ACNC: 11.4 SEC — SIGNIFICANT CHANGE UP (ref 9.8–12.7)
RBC # BLD: 4.8 M/UL — SIGNIFICANT CHANGE UP (ref 4.2–5.8)
RBC # FLD: 12.9 % — SIGNIFICANT CHANGE UP (ref 10.3–14.5)
SODIUM SERPL-SCNC: 142 MMOL/L — SIGNIFICANT CHANGE UP (ref 135–145)
WBC # BLD: 8.5 K/UL — SIGNIFICANT CHANGE UP (ref 3.8–10.5)
WBC # FLD AUTO: 8.5 K/UL — SIGNIFICANT CHANGE UP (ref 3.8–10.5)

## 2017-08-15 PROCEDURE — 84156 ASSAY OF PROTEIN URINE: CPT

## 2017-08-15 PROCEDURE — 83880 ASSAY OF NATRIURETIC PEPTIDE: CPT

## 2017-08-15 PROCEDURE — 84300 ASSAY OF URINE SODIUM: CPT

## 2017-08-15 PROCEDURE — 84100 ASSAY OF PHOSPHORUS: CPT

## 2017-08-15 PROCEDURE — 85610 PROTHROMBIN TIME: CPT

## 2017-08-15 PROCEDURE — 93017 CV STRESS TEST TRACING ONLY: CPT

## 2017-08-15 PROCEDURE — 93306 TTE W/DOPPLER COMPLETE: CPT

## 2017-08-15 PROCEDURE — 83735 ASSAY OF MAGNESIUM: CPT

## 2017-08-15 PROCEDURE — 82570 ASSAY OF URINE CREATININE: CPT

## 2017-08-15 PROCEDURE — 80053 COMPREHEN METABOLIC PANEL: CPT

## 2017-08-15 PROCEDURE — 71046 X-RAY EXAM CHEST 2 VIEWS: CPT

## 2017-08-15 PROCEDURE — 80048 BASIC METABOLIC PNL TOTAL CA: CPT

## 2017-08-15 PROCEDURE — 81001 URINALYSIS AUTO W/SCOPE: CPT

## 2017-08-15 PROCEDURE — 84484 ASSAY OF TROPONIN QUANT: CPT

## 2017-08-15 PROCEDURE — 83935 ASSAY OF URINE OSMOLALITY: CPT

## 2017-08-15 PROCEDURE — 93970 EXTREMITY STUDY: CPT

## 2017-08-15 PROCEDURE — 93010 ELECTROCARDIOGRAM REPORT: CPT

## 2017-08-15 PROCEDURE — 99285 EMERGENCY DEPT VISIT HI MDM: CPT | Mod: 25

## 2017-08-15 PROCEDURE — A9502: CPT

## 2017-08-15 PROCEDURE — 78452 HT MUSCLE IMAGE SPECT MULT: CPT

## 2017-08-15 PROCEDURE — 84443 ASSAY THYROID STIM HORMONE: CPT

## 2017-08-15 PROCEDURE — 82607 VITAMIN B-12: CPT

## 2017-08-15 PROCEDURE — 85379 FIBRIN DEGRADATION QUANT: CPT

## 2017-08-15 PROCEDURE — 85027 COMPLETE CBC AUTOMATED: CPT

## 2017-08-15 PROCEDURE — 85730 THROMBOPLASTIN TIME PARTIAL: CPT

## 2017-08-15 PROCEDURE — 83036 HEMOGLOBIN GLYCOSYLATED A1C: CPT

## 2017-08-15 RX ORDER — SODIUM CHLORIDE 9 MG/ML
3 INJECTION INTRAMUSCULAR; INTRAVENOUS; SUBCUTANEOUS EVERY 8 HOURS
Qty: 0 | Refills: 0 | Status: DISCONTINUED | OUTPATIENT
Start: 2017-08-15 | End: 2017-08-25

## 2017-08-15 RX ORDER — ISOSORBIDE MONONITRATE 60 MG/1
1 TABLET, EXTENDED RELEASE ORAL
Qty: 0 | Refills: 0 | COMMUNITY
Start: 2017-08-15

## 2017-08-15 RX ORDER — METOPROLOL TARTRATE 50 MG
1 TABLET ORAL
Qty: 0 | Refills: 0 | COMMUNITY
Start: 2017-08-15

## 2017-08-15 RX ORDER — ASPIRIN/CALCIUM CARB/MAGNESIUM 324 MG
1 TABLET ORAL
Qty: 0 | Refills: 0 | COMMUNITY
Start: 2017-08-15

## 2017-08-15 RX ORDER — DEXTROSE 50 % IN WATER 50 %
1 SYRINGE (ML) INTRAVENOUS ONCE
Qty: 0 | Refills: 0 | Status: DISCONTINUED | OUTPATIENT
Start: 2017-08-15 | End: 2017-08-25

## 2017-08-15 RX ORDER — INSULIN LISPRO 100/ML
VIAL (ML) SUBCUTANEOUS
Qty: 0 | Refills: 0 | Status: DISCONTINUED | OUTPATIENT
Start: 2017-08-15 | End: 2017-08-25

## 2017-08-15 RX ORDER — ISOSORBIDE MONONITRATE 60 MG/1
30 TABLET, EXTENDED RELEASE ORAL DAILY
Qty: 0 | Refills: 0 | Status: DISCONTINUED | OUTPATIENT
Start: 2017-08-15 | End: 2017-08-15

## 2017-08-15 RX ORDER — LOSARTAN POTASSIUM 100 MG/1
50 TABLET, FILM COATED ORAL DAILY
Qty: 0 | Refills: 0 | Status: DISCONTINUED | OUTPATIENT
Start: 2017-08-15 | End: 2017-08-15

## 2017-08-15 RX ORDER — SODIUM CHLORIDE 9 MG/ML
500 INJECTION INTRAMUSCULAR; INTRAVENOUS; SUBCUTANEOUS
Qty: 0 | Refills: 0 | Status: COMPLETED | OUTPATIENT
Start: 2017-08-15 | End: 2017-08-15

## 2017-08-15 RX ORDER — GLUCAGON INJECTION, SOLUTION 0.5 MG/.1ML
1 INJECTION, SOLUTION SUBCUTANEOUS ONCE
Qty: 0 | Refills: 0 | Status: DISCONTINUED | OUTPATIENT
Start: 2017-08-15 | End: 2017-08-25

## 2017-08-15 RX ORDER — DEXTROSE 50 % IN WATER 50 %
25 SYRINGE (ML) INTRAVENOUS ONCE
Qty: 0 | Refills: 0 | Status: DISCONTINUED | OUTPATIENT
Start: 2017-08-15 | End: 2017-08-25

## 2017-08-15 RX ORDER — ATORVASTATIN CALCIUM 80 MG/1
1 TABLET, FILM COATED ORAL
Qty: 0 | Refills: 0 | COMMUNITY
Start: 2017-08-15

## 2017-08-15 RX ORDER — INSULIN LISPRO 100/ML
0 VIAL (ML) SUBCUTANEOUS
Qty: 0 | Refills: 0 | COMMUNITY
Start: 2017-08-15

## 2017-08-15 RX ORDER — METOPROLOL TARTRATE 50 MG
25 TABLET ORAL
Qty: 0 | Refills: 0 | Status: DISCONTINUED | OUTPATIENT
Start: 2017-08-15 | End: 2017-08-15

## 2017-08-15 RX ORDER — INSULIN GLARGINE 100 [IU]/ML
10 INJECTION, SOLUTION SUBCUTANEOUS
Qty: 0 | Refills: 0 | COMMUNITY
Start: 2017-08-15

## 2017-08-15 RX ORDER — HEPARIN SODIUM 5000 [USP'U]/ML
4500 INJECTION INTRAVENOUS; SUBCUTANEOUS EVERY 6 HOURS
Qty: 0 | Refills: 0 | Status: DISCONTINUED | OUTPATIENT
Start: 2017-08-15 | End: 2017-08-18

## 2017-08-15 RX ORDER — INSULIN GLARGINE 100 [IU]/ML
10 INJECTION, SOLUTION SUBCUTANEOUS AT BEDTIME
Qty: 0 | Refills: 0 | Status: DISCONTINUED | OUTPATIENT
Start: 2017-08-15 | End: 2017-08-25

## 2017-08-15 RX ORDER — METOPROLOL TARTRATE 50 MG
50 TABLET ORAL
Qty: 0 | Refills: 0 | Status: DISCONTINUED | OUTPATIENT
Start: 2017-08-15 | End: 2017-08-25

## 2017-08-15 RX ORDER — METOPROLOL TARTRATE 50 MG
5 TABLET ORAL ONCE
Qty: 0 | Refills: 0 | Status: COMPLETED | OUTPATIENT
Start: 2017-08-15 | End: 2017-08-15

## 2017-08-15 RX ORDER — ATORVASTATIN CALCIUM 80 MG/1
40 TABLET, FILM COATED ORAL AT BEDTIME
Qty: 0 | Refills: 0 | Status: DISCONTINUED | OUTPATIENT
Start: 2017-08-15 | End: 2017-08-25

## 2017-08-15 RX ORDER — HEPARIN SODIUM 5000 [USP'U]/ML
9000 INJECTION INTRAVENOUS; SUBCUTANEOUS EVERY 6 HOURS
Qty: 0 | Refills: 0 | Status: DISCONTINUED | OUTPATIENT
Start: 2017-08-15 | End: 2017-08-18

## 2017-08-15 RX ORDER — ASPIRIN/CALCIUM CARB/MAGNESIUM 324 MG
81 TABLET ORAL DAILY
Qty: 0 | Refills: 0 | Status: DISCONTINUED | OUTPATIENT
Start: 2017-08-16 | End: 2017-08-25

## 2017-08-15 RX ORDER — LOSARTAN POTASSIUM 100 MG/1
1 TABLET, FILM COATED ORAL
Qty: 0 | Refills: 0 | COMMUNITY
Start: 2017-08-15

## 2017-08-15 RX ORDER — SODIUM CHLORIDE 9 MG/ML
1000 INJECTION, SOLUTION INTRAVENOUS
Qty: 0 | Refills: 0 | Status: DISCONTINUED | OUTPATIENT
Start: 2017-08-15 | End: 2017-08-25

## 2017-08-15 RX ORDER — HYDROCHLOROTHIAZIDE 25 MG
25 TABLET ORAL DAILY
Qty: 0 | Refills: 0 | Status: DISCONTINUED | OUTPATIENT
Start: 2017-08-15 | End: 2017-08-15

## 2017-08-15 RX ORDER — DEXTROSE 50 % IN WATER 50 %
12.5 SYRINGE (ML) INTRAVENOUS ONCE
Qty: 0 | Refills: 0 | Status: DISCONTINUED | OUTPATIENT
Start: 2017-08-15 | End: 2017-08-25

## 2017-08-15 RX ORDER — HEPARIN SODIUM 5000 [USP'U]/ML
INJECTION INTRAVENOUS; SUBCUTANEOUS
Qty: 25000 | Refills: 0 | Status: DISCONTINUED | OUTPATIENT
Start: 2017-08-15 | End: 2017-08-15

## 2017-08-15 RX ORDER — HEPARIN SODIUM 5000 [USP'U]/ML
INJECTION INTRAVENOUS; SUBCUTANEOUS
Qty: 25000 | Refills: 0 | Status: DISCONTINUED | OUTPATIENT
Start: 2017-08-15 | End: 2017-08-18

## 2017-08-15 RX ORDER — HEPARIN SODIUM 5000 [USP'U]/ML
25000 INJECTION INTRAVENOUS; SUBCUTANEOUS
Qty: 0 | Refills: 0 | COMMUNITY
Start: 2017-08-15

## 2017-08-15 RX ADMIN — INSULIN GLARGINE 10 UNIT(S): 100 INJECTION, SOLUTION SUBCUTANEOUS at 22:23

## 2017-08-15 RX ADMIN — Medication: at 18:10

## 2017-08-15 RX ADMIN — Medication 5 MILLIGRAM(S): at 10:09

## 2017-08-15 RX ADMIN — SODIUM CHLORIDE 75 MILLILITER(S): 9 INJECTION INTRAMUSCULAR; INTRAVENOUS; SUBCUTANEOUS at 22:11

## 2017-08-15 RX ADMIN — SODIUM CHLORIDE 75 MILLILITER(S): 9 INJECTION INTRAMUSCULAR; INTRAVENOUS; SUBCUTANEOUS at 18:11

## 2017-08-15 RX ADMIN — HEPARIN SODIUM 2000 UNIT(S)/HR: 5000 INJECTION INTRAVENOUS; SUBCUTANEOUS at 23:02

## 2017-08-15 RX ADMIN — LOSARTAN POTASSIUM 50 MILLIGRAM(S): 100 TABLET, FILM COATED ORAL at 06:21

## 2017-08-15 RX ADMIN — SODIUM CHLORIDE 3 MILLILITER(S): 9 INJECTION INTRAMUSCULAR; INTRAVENOUS; SUBCUTANEOUS at 22:26

## 2017-08-15 RX ADMIN — ATORVASTATIN CALCIUM 40 MILLIGRAM(S): 80 TABLET, FILM COATED ORAL at 22:23

## 2017-08-15 RX ADMIN — HEPARIN SODIUM 1000 UNIT(S)/HR: 5000 INJECTION INTRAVENOUS; SUBCUTANEOUS at 11:44

## 2017-08-15 RX ADMIN — Medication 25 MILLIGRAM(S): at 06:21

## 2017-08-15 RX ADMIN — Medication 50 MILLIGRAM(S): at 22:27

## 2017-08-15 RX ADMIN — ISOSORBIDE MONONITRATE 30 MILLIGRAM(S): 60 TABLET, EXTENDED RELEASE ORAL at 11:46

## 2017-08-15 RX ADMIN — Medication 81 MILLIGRAM(S): at 11:45

## 2017-08-15 NOTE — TRANSFER ACCEPTANCE NOTE - HISTORY OF PRESENT ILLNESS
82 year old male with HTN, hyperlipidemia, DM-II, know CAD with CABG 2011 who     presented to [facility] ED 8/11/17 complaining of SOB and chronic lower extremity     edema. Found to have new onset atrial fibrillation wth RVR prior to transfer, treated with Lopressor 5mg IV x1. R/O MI.   Underwent a cardiac work up:   ECHO 8/12/17: Normal mitral valve. Mild mitral regurgitation.  2. Calcified trileaflet aortic valve with normal opening.  3. Aortic Root: 4.0 cm.  4. Moderate left atrial enlargement.  5. Moderate concentric left ventricular hypertrophy.  6. Moderate global left ventricular dysfunction  (EF=40-45%).  7. Grade III diastolic dysfunction  8. Normal right atrium.  9. Normal right ventricular size and function.  10. RV systolic pressure is moderately increased (PASP 48mm  Hg).  11. There is mild tricuspid regurgitation.  12. There is mild pulmonic regurgitation.  13. Normal pericardium with no pericardial effusion.      Nuclear stress test 8/14/17 revealing EF 51%, medium sized, mild to moderate     defect in the inferior wall that is partially reversible, suggestive of     infarction with moderate steffi-infarct ischemia.  There is  a small, mild defect in the anterior wall that is reversible, suggestive of     ischemia.  There is a small, mild to moderate defect in the lateral wall that     is partially reversible, suggestive of infarction with mild-moderate steffi-    infarct ischemia.    In light of patients CAD history, symptoms and abnormal noninvasive test     findings there is high suspicion for CAD progression. Patient is now     transferred to [facility] 8/15/17 for a cardiac catheterization with possible     PTCA/stent.     LE venous duplex: No evidence of bilateral lower extremity deep vein     thrombosis.

## 2017-08-15 NOTE — CHART NOTE - NSCHARTNOTEFT_GEN_A_CORE
case discussed with Dr Nuñez, plan for likely PCI of CAD on Thursday if Cr remains stable. Patient to get IVF hydration post cath today, will hold antihypertensives and increase metoprolol PO for now for better rate control.   Primary team to start Heparin drip this evening 6hours post sheath pull if groin site check OK for new onset afib.

## 2017-08-15 NOTE — TRANSFER ACCEPTANCE NOTE - PSH
H/O Moh's micrographic surgery for skin cancer    History of cataract surgery    S/P CABG x 4  2011 at [facility]

## 2017-08-15 NOTE — CHART NOTE - NSCHARTNOTEFT_GEN_A_CORE
NATALIE JUAREZ 82y Male s/p cath femoral site check. Cardiac cath showed dLM 90, , ostial LCx 95.    Dressing is clear/dry/intact.   Site is without hematoma or bleeding.     Vital Signs Last 24 Hrs  T(F): 97.5 (08-15-17 @ 19:26), Max: 98.6 (08-14-17 @ 23:25)  HR: 61 (08-15-17 @ 22:19) (59 - 104)  BP: 123/64 (08-15-17 @ 22:19) (96/62 - 155/67)  RR: 18 (08-15-17 @ 19:26) (16 - 18)  SpO2: 95% (08-15-17 @ 19:26) (95% - 97%)    Pulses palpable, cap refill <2 sec.   Patient denies pain, numbness, tingling, CP, SOB.  Heparin gtt without bolus ordered to start at 2300.  d/w ARMANI Amor.

## 2017-08-15 NOTE — TRANSFER ACCEPTANCE NOTE - PROBLEM SELECTOR PLAN 1
telemetry  cardiac catheterization  patient was given ASA 81mg po x1 today at [facility]

## 2017-08-15 NOTE — ACUTE INTERFACILITY TRANSFER NOTE - PLAN OF CARE
resolution of symptom pt has reversible ischemia on stress test, developed Afib with RVR in 150s this am, give IV lopressor  pt going to [facility] for cardiac catheterization HbA1c< 7.0 continue lantus 10 units at bedtime, monitor accuchecks BP < 140/90 continue with BP medications, monitor BP LDL<70 continue with statin

## 2017-08-15 NOTE — PROGRESS NOTE ADULT - ASSESSMENT
82 year old Male from home lives alone, ambulates independently, with PMH Diabetes, CAD s/p CABG in Nov 2011, Obesity, OA, macular degeneration came with c/o SOB on exertion since one month ago. . Admitted to Telemetry to R/O MI given patient's risk factor of CAD and CHF/ Ischemic Heart disease.
-sob ? anginal equivalent  -cad, hx; cabg  -metabolic syndrome;htn,dm, obesity    PLAN; for nuclear stress this am            cardio f/u re discharge            statins/ace/insulin
-sob; anginal equivalent  -cad, hx; cabg  -metabolic syndrome;htn,dm, obesity    PLAN; FOR LIKELY TRANSFER TODAY FOR CARDIAC CATH
1. Exertional sx suggestive of coronary insufficiency, though from 2Decho possibility from diast dysf.  2.  Established CAD, s/p CABG >> no indication ACS.  3. Hypertensive and despite benign EKG 2D echo documentation LVH with significant diastolic dysf. (?proBNP not signif elevated)    Plan:    1. BP control >> Losartan, diuretic.  2. Anti -ischemic long acting nitrates, given HR cannot adm beta blocker  3. Diagnostic pharm nuclear ST today.    LABS:                         12.4   7.0   )-----------( 168      ( 14 Aug 2017 06:48 )             37.4     08-14    144  |  110<H>  |  21<H>  ----------------------------<  105<H>  3.9   |  24  |  1.23    Ca    9.0      14 Aug 2017 06:48  Phos  3.4     08-14  Mg     2.3     08-14    MEDICATIONS  (STANDING):  dextrose 5%. 1000 milliLiter(s) (50 mL/Hr) IV Continuous <Continuous>  dextrose 50% Injectable 12.5 Gram(s) IV Push once  dextrose 50% Injectable 25 Gram(s) IV Push once  dextrose 50% Injectable 25 Gram(s) IV Push once  insulin glargine Injectable (LANTUS) 10 Unit(s) SubCutaneous at bedtime  insulin lispro (HumaLOG) corrective regimen sliding scale   SubCutaneous three times a day before meals  aspirin enteric coated 81 milliGRAM(s) Oral daily  enoxaparin Injectable 40 milliGRAM(s) SubCutaneous daily  isosorbide   mononitrate ER Tablet (IMDUR) 30 milliGRAM(s) Oral daily  hydrochlorothiazide 25 milliGRAM(s) Oral daily  atorvastatin 40 milliGRAM(s) Oral at bedtime  losartan 50 milliGRAM(s) Oral daily
1. Exertional sx suggestive of coronary insufficiency, though from 2Decho possibility from diast dysf.  2. Established CAD, s/p CABG >> no indication ACS.  3. Hypertensive and despite benign EKG 2D echo documentation LVH with significant diastolic dysf. (?proBNP not signif elevated)  4. By CxR, pro-BNP not in clinical CHF.  5. AODM    Plan:    1. BP control >> Losartan, diuretic.  2. Anti -ischemic long acting nitrates   3. Diagnostic pharm nuclear ST.  5. Will probably need diagnostic pharm nuclear ST during this adm      MEDICATIONS  (STANDING):  dextrose 5%. 1000 milliLiter(s) (50 mL/Hr) IV Continuous <Continuous>  dextrose 50% Injectable 12.5 Gram(s) IV Push once  dextrose 50% Injectable 25 Gram(s) IV Push once  dextrose 50% Injectable 25 Gram(s) IV Push once  losartan 50 milliGRAM(s) Oral daily  insulin glargine Injectable (LANTUS) 10 Unit(s) SubCutaneous at bedtime  insulin lispro (HumaLOG) corrective regimen sliding scale   SubCutaneous three times a day before meals  aspirin enteric coated 81 milliGRAM(s) Oral daily  enoxaparin Injectable 40 milliGRAM(s) SubCutaneous daily  isosorbide   mononitrate ER Tablet (IMDUR) 30 milliGRAM(s) Oral daily  hydrochlorothiazide 25 milliGRAM(s) Oral daily  atorvastatin 40 milliGRAM(s) Oral at bedtime
1.sob/chf/cad/hcvd  clinically improving  cont rx as per cardio  workup as per cardio  2.diabetes  sugars ok on insulin  3.severe oa  d/w pt in detail
82 year old Male from home lives alone, ambulates independently, with PMH Diabetes, CAD s/p CABG in Nov 2011, Obesity, OA, macular degeneration came with c/o SOB on exertion since one month ago. . Admitted to Telemetry to R/O MI given patient's risk factor of CAD and CHF/ Ischemic Heart disease.

## 2017-08-15 NOTE — TRANSFER ACCEPTANCE NOTE - ASSESSMENT
81yo male who presented to [facility] ED 8/11/17 c/o SOB, R/O MI, underwent nuclear stress test with abnormal results and transferred to [facility] for a cath.

## 2017-08-15 NOTE — TRANSFER ACCEPTANCE NOTE - PMH
Atrial fibrillation    CAD (coronary artery disease)    DM (diabetes mellitus)    Dyslipidemia    History of Obesity    HTN (hypertension)

## 2017-08-15 NOTE — ACUTE INTERFACILITY TRANSFER NOTE - HOSPITAL COURSE
82 year old Male from home lives alone, ambulates independently, with PMH Diabetes, CAD S/p stent? CABG? in Nov 2011, Obesity, OA, macular degeneration came with c/o SOB on exertion since one month ago. He reports that He become SOB walking one block,  His exertional SOB was progressively getting worse since one week, so he called his physician who told him to go to ER. Dyspnea is aggravated by lifting grocery bags and walking more then a block, relieved by sitting down. He denies Orthopnea. He reported that he goes to his cardiologist Dr. Rose olivera in [facility] who scheduled him for Nuclear Stress test but he cancelled that test as it was expensive. He did not know whether his cardiologist has done an Echo and EF. CXR was done "Focal opacity in the periphery of the left lower lobe along the lateral aspect of the left hemidiaphragm not well visualized on the lateral image which could represent focal scarring/atelectasis. Otherwise, clear lungs. Pt was admitted for Heart failure/ Ischemic Heart Disease.   -ProBNP is 549, Trop x 3 negative EKG: NST- Twave changes, some PVCS, D/dimers negative, U/S Doppler b/l Lower extremity ruled out DVT , echo shows EF 40-45% with grade 3 diastolic dysfunction, moderately incread RV systolic pressure. NST revealed several areas of reversible ischemia.   *Negative ECG evidence of ischemia after IV  administration of Regadenoson.  * Myocardial Perfusion SPECT results are abnormal.  * There is a medium sized, mild to moderate defect in the  inferior wall that is partially reversible, suggestive of  infarction with moderate steffi-infarct ischemia.  There is  a small, mild defect in the anterior wall that is  reversible, suggestive of ischemia.  There is a small,  mild to moderate defect in the lateral wall that is  partially reversible, suggestive of infarction with  mild-moderate steffi-infarct ischemia.  * Post-stress resting myocardial perfusion gated SPECT  imaging was performed (LVEF = 51 %).    Pt experienced episode of Afib this AM with  RVR in 150s. Pt given IV lopressor 5mg PUSH stat and placed on heparin drip, planned for cardiac cath this PM as per Dr. Linn,in [facility].

## 2017-08-15 NOTE — ACUTE INTERFACILITY TRANSFER NOTE - CARE PROVIDER_API CALL
Temitope Nuñez), Cardiovascular Disease; Internal Medicine; Interventional Cardiology  [address] [facility] [address] [place] [address]  Phone: [phone]  Fax: [phone]

## 2017-08-15 NOTE — CHART NOTE - NSCHARTNOTEFT_GEN_A_CORE
Pt was seen and examined.  Briefly this is a elderly male c hx diet controlled DM HTN with positive stress and reduction in EF.  He has new afib(RVR)  Renal Azotemia  Tachycardia and soft BP    Suggest    -No renal objection to cardiac cath.  No need for IVF  -?Can we reduce or DC  Losartan and increase  Lopressor for heart rate control        Sayed Yoon  [facility]

## 2017-08-16 LAB
APTT BLD: 89.9 SEC — HIGH (ref 27.5–37.4)
APTT BLD: > 200 SEC — CRITICAL HIGH (ref 27.5–37.4)
BUN SERPL-MCNC: 24 MG/DL — HIGH (ref 7–23)
CALCIUM SERPL-MCNC: 9.5 MG/DL — SIGNIFICANT CHANGE UP (ref 8.4–10.5)
CHLORIDE SERPL-SCNC: 99 MMOL/L — SIGNIFICANT CHANGE UP (ref 98–107)
CO2 SERPL-SCNC: 26 MMOL/L — SIGNIFICANT CHANGE UP (ref 22–31)
CREAT SERPL-MCNC: 1.21 MG/DL — SIGNIFICANT CHANGE UP (ref 0.5–1.3)
GLUCOSE SERPL-MCNC: 99 MG/DL — SIGNIFICANT CHANGE UP (ref 70–99)
HCT VFR BLD CALC: 41.3 % — SIGNIFICANT CHANGE UP (ref 39–50)
HGB BLD-MCNC: 13.4 G/DL — SIGNIFICANT CHANGE UP (ref 13–17)
MAGNESIUM SERPL-MCNC: 2.1 MG/DL — SIGNIFICANT CHANGE UP (ref 1.6–2.6)
MCHC RBC-ENTMCNC: 29.4 PG — SIGNIFICANT CHANGE UP (ref 27–34)
MCHC RBC-ENTMCNC: 32.4 % — SIGNIFICANT CHANGE UP (ref 32–36)
MCV RBC AUTO: 90.6 FL — SIGNIFICANT CHANGE UP (ref 80–100)
NRBC # FLD: 0 — SIGNIFICANT CHANGE UP
PHOSPHATE SERPL-MCNC: 4.3 MG/DL — SIGNIFICANT CHANGE UP (ref 2.5–4.5)
PLATELET # BLD AUTO: 213 K/UL — SIGNIFICANT CHANGE UP (ref 150–400)
PMV BLD: 11.5 FL — SIGNIFICANT CHANGE UP (ref 7–13)
POTASSIUM SERPL-MCNC: 3.9 MMOL/L — SIGNIFICANT CHANGE UP (ref 3.5–5.3)
POTASSIUM SERPL-SCNC: 3.9 MMOL/L — SIGNIFICANT CHANGE UP (ref 3.5–5.3)
RBC # BLD: 4.56 M/UL — SIGNIFICANT CHANGE UP (ref 4.2–5.8)
RBC # FLD: 13.9 % — SIGNIFICANT CHANGE UP (ref 10.3–14.5)
SODIUM SERPL-SCNC: 140 MMOL/L — SIGNIFICANT CHANGE UP (ref 135–145)
WBC # BLD: 7.83 K/UL — SIGNIFICANT CHANGE UP (ref 3.8–10.5)
WBC # FLD AUTO: 7.83 K/UL — SIGNIFICANT CHANGE UP (ref 3.8–10.5)

## 2017-08-16 RX ADMIN — HEPARIN SODIUM 1600 UNIT(S)/HR: 5000 INJECTION INTRAVENOUS; SUBCUTANEOUS at 18:19

## 2017-08-16 RX ADMIN — HEPARIN SODIUM 0 UNIT(S)/HR: 5000 INJECTION INTRAVENOUS; SUBCUTANEOUS at 17:16

## 2017-08-16 RX ADMIN — SODIUM CHLORIDE 3 MILLILITER(S): 9 INJECTION INTRAMUSCULAR; INTRAVENOUS; SUBCUTANEOUS at 21:29

## 2017-08-16 RX ADMIN — HEPARIN SODIUM 2000 UNIT(S)/HR: 5000 INJECTION INTRAVENOUS; SUBCUTANEOUS at 09:47

## 2017-08-16 RX ADMIN — SODIUM CHLORIDE 3 MILLILITER(S): 9 INJECTION INTRAMUSCULAR; INTRAVENOUS; SUBCUTANEOUS at 13:00

## 2017-08-16 RX ADMIN — ATORVASTATIN CALCIUM 40 MILLIGRAM(S): 80 TABLET, FILM COATED ORAL at 21:35

## 2017-08-16 RX ADMIN — Medication 50 MILLIGRAM(S): at 18:21

## 2017-08-16 RX ADMIN — INSULIN GLARGINE 10 UNIT(S): 100 INJECTION, SOLUTION SUBCUTANEOUS at 21:35

## 2017-08-16 RX ADMIN — Medication 81 MILLIGRAM(S): at 13:03

## 2017-08-16 RX ADMIN — SODIUM CHLORIDE 3 MILLILITER(S): 9 INJECTION INTRAMUSCULAR; INTRAVENOUS; SUBCUTANEOUS at 06:19

## 2017-08-16 RX ADMIN — Medication 50 MILLIGRAM(S): at 06:19

## 2017-08-16 NOTE — PATIENT PROFILE ADULT. - NS TRANSFER PATIENT BELONGINGS
pt states belongings were left in ED, Brianna LOMELI in ED contacted to have belongings brought to patient with patient retrieved from cath lab on 8/16, currently with patient Wrist Watch/Clothing/retrieved from cath lab on 8/16

## 2017-08-16 NOTE — CONSULT NOTE ADULT - ATTENDING COMMENTS
EP ATTENDING    Agree with above. Has recurrent PAF with elevated chads-vasc. Recommend lifelong coumadin. Continue heparin drip until cath procedures completed, and then start whichever NOAC is covered by his insurance. Given LVEF 45% no further EP workup needed. Cardiac cath scheduling as per Dr. Nuñez

## 2017-08-16 NOTE — PROGRESS NOTE ADULT - ASSESSMENT
The patient is a natanael 82-year-old gentleman with past medical history of diabetes, hypertension, and coronary artery disease, status post coronary artery bypass graft, who presents with positive stress test, new decrease in ejection fraction, and new onset atrial fibrillation.    CKD stage 3    1.	Renal.  No renal objections to repeat angiogram in am.  No evidence of ANNETTA at present.  No need for Mucomyst at present.  2.	Cardiology.  Heart rate control per Cardiology.  Losartan was DC as was the HCTZ.  3.	Endocrine.  Awaiting TSH with next blood draw.

## 2017-08-16 NOTE — CONSULT NOTE ADULT - SUBJECTIVE AND OBJECTIVE BOX
HISTORY OF PRESENT ILLNESS:    83 year old man with HTN, HLD, DM, CAD, post remote MI and 4V CABG performed in November 2011, historically preserved LV systolic function, chronic lower extremity edema, PAF post op in 2011 and was on Amio and Coumadin, Amio DC in 2/2015, unclear when he D/C Coumadin, who established care with us in 2015, presented to [facility] with WHEAT and was found to have rapid PAfib and had an abnormal NST.  He was transferred to [facility] and had a cardiac cath 8/15, planned for PCI 8/16.  He converted back to  overnight.     PAST MEDICAL & SURGICAL HISTORY:  Atrial fibrillation  CAD (coronary artery disease)  HTN (hypertension)  DM (diabetes mellitus)  Dyslipidemia  History of Obesity  H/O Moh's micrographic surgery for skin cancer  History of cataract surgery  S/P CABG x 4: 2011 at [facility]      FAMILY HISTORY:  No pertinent family history in first degree relatives      SOCIAL HISTORY:    (x ) Non-smoker ( ) Smoker (x ) Alcohol Abuse-quit in 1978 ( ) IVDA    Allergies    No Known Allergies    MEDICATIONS  (STANDING):  sodium chloride 0.9% lock flush 3 milliLiter(s) IV Push every 8 hours  insulin lispro (HumaLOG) corrective regimen sliding scale   SubCutaneous three times a day before meals  dextrose 5%. 1000 milliLiter(s) (50 mL/Hr) IV Continuous <Continuous>  dextrose 50% Injectable 12.5 Gram(s) IV Push once  dextrose 50% Injectable 25 Gram(s) IV Push once  dextrose 50% Injectable 25 Gram(s) IV Push once  aspirin enteric coated 81 milliGRAM(s) Oral daily  insulin glargine Injectable (LANTUS) 10 Unit(s) SubCutaneous at bedtime  atorvastatin 40 milliGRAM(s) Oral at bedtime  metoprolol 50 milliGRAM(s) Oral two times a day  heparin  Infusion.  Unit(s)/Hr (20 mL/Hr) IV Continuous <Continuous>    MEDICATIONS  (PRN):  dextrose Gel 1 Dose(s) Oral once PRN Blood Glucose LESS THAN 70 milliGRAM(s)/deciliter  glucagon  Injectable 1 milliGRAM(s) IntraMuscular once PRN Glucose LESS THAN 70 milligrams/deciliter  heparin  Injectable 9000 Unit(s) IV Push every 6 hours PRN For aPTT less than 40  heparin  Injectable 4500 Unit(s) IV Push every 6 hours PRN For aPTT between 40 - 57      REVIEW OF SYSTEMS:     CONSTITUTIONAL: No fever, weight loss, or fatigue  RESPIRATORY: No cough, wheezing, chills or hemoptysis; No Shortness of Breath  CARDIOVASCULAR: No chest pain, palpitations, passing out, dizziness, or leg swelling  GASTROINTESTINAL: No abdominal or epigastric pain. No nausea, vomiting, or hematemesis; No diarrhea or constipation. No melena or hematochezia.  GENITOURINARY: No dysuria, frequency, hematuria, or incontinence  NEUROLOGICAL: No headaches, memory loss, loss of strength, numbness, or tremors  	  [x ] All others negative	  [ ] Unable to obtain    PHYSICAL EXAM:  Vital Signs Last 24 Hrs  T(C): 36.4 (16 Aug 2017 06:18), Max: 36.4 (15 Aug 2017 19:26)  T(F): 97.6 (16 Aug 2017 06:18), Max: 97.6 (16 Aug 2017 06:18)  HR: 52 (16 Aug 2017 06:18) (52 - 71)  BP: 133/85 (16 Aug 2017 06:18) (96/62 - 133/85)  RR: 16 (16 Aug 2017 06:18) (16 - 18)  SpO2: 99% (16 Aug 2017 06:18) (95% - 99%)  		  Cardiovascular: Normal S1 S2, No JVD, No murmurs, No edema  Respiratory: Lungs clear to auscultation Bilaterally	  Psychiatry: A & O x 3, Mood & affect appropriate  Gastrointestinal:  Soft, Non-tender, + BS	  Skin: No rashes, No ecchymoses, No cyanosis	  Extremities: Normal range of motion, No clubbing, cyanosis or edema  Vascular: Peripheral pulses palpable 2+ bilaterally    DATA:    TELEMETRY: SR 50-60 	      PREVIOUS DIAGNOSTIC TESTING:     < from: Transthoracic Echocardiogram (08.12.17 @ 07:13) >  CONCLUSIONS:  1. Normal mitral valve. Mild mitral regurgitation.  2. Calcified trileaflet aortic valve with normal opening.  3. Aortic Root: 4.0 cm.  4. Moderate left atrial enlargement.  5. Moderate concentric left ventricular hypertrophy.  6. Moderate global left ventricular dysfunction  (EF=40-45%).  7. Grade III diastolic dysfunction  8. Normal right atrium.  9. Normal right ventricular size and function.  10. RV systolic pressure is moderately increased (PASP 48mm  Hg).  11. There is mild tricuspid regurgitation.  12. There is mild pulmonic regurgitation.  13. Normal pericardium with no pericardial effusion.    ------------------------------------------------------------------------  Confirmed on  8/13/2017 - 08:29:04 by Paula Martinez MD    < end of copied text >    < from: Nuclear Stress Test-Pharmacologic (08.14.17 @ 09:57) >  IMPRESSIONS:  * Negative ECG evidence of ischemia after IV  administration of Regadenoson.  * Myocardial Perfusion SPECT results are abnormal.  * There is a medium sized, mild to moderate defect in the  inferior wall that is partially reversible, suggestive of  infarction with moderate steffi-infarct ischemia.  There is  a small, mild defect in the anterior wall that is  reversible, suggestive of ischemia.  There is a small,  mild to moderate defect in the lateral wall that is  partially reversible, suggestive of infarction with  mild-moderate steffi-infarct ischemia.  * Post-stress resting myocardial perfusion gated SPECT  imaging was performed (LVEF = 51 %).    Confirmed on  8/14/2017 - 12:44:15 at [place] by Guru Joaquin M.D.  ---------------------    < end of copied text >  	  LABS:	                       13.4   7.83  )-----------( 213      ( 16 Aug 2017 07:30 )             41.3     140  |  99  |  24<H>  ----------------------------<  99  3.9   |  26  |  1.21    Ca    9.5      16 Aug 2017 07:30  Phos  4.3     08-16  Mg     2.1     08-16    TPro  7.2  /  Alb  3.7  /  TBili  0.5  /  DBili  x   /  AST  18  /  ALT  22  /  AlkPhos  80  08-15  PTT - ( 16 Aug 2017 07:30 )  PTT:89.9 SEC    ASSESSMENT/PLAN: 	  83 year old man with HTN, HLD, DM, CAD, post remote MI and 4V CABG performed in November 2011, historically preserved LV systolic function, chronic lower extremity edema, PAF post op in 2011 and was on Amio and Coumadin, Amio DC in 2/2015, unclear when he D/C Coumadin, who established care with us in 2015, presented to [facility] with WHEAT and was found to have rapid PAfib, new LV dysfxn and had an abnormal NST.  He was transferred to [facility] and had a cardiac cath 8/15 revealing LM and ostial LCx disease, planned for PCI 8/16.  He converted back to SR overnight.    --given his elevated chadsvasc score, recommend lifelong AC with whatever NOAC is covered by his insurance  --converted back to SR on metoprolol  --TSH wnl  --Continue tele    Vivienne Abernathy PA-C  [facility]  [address], [address]   [place] [address]  office [phone]  pager [phone]

## 2017-08-16 NOTE — PROGRESS NOTE ADULT - SUBJECTIVE AND OBJECTIVE BOX
Subjective: No chest pain or sob   	  MEDICATIONS:  MEDICATIONS  (STANDING):  sodium chloride 0.9% lock flush 3 milliLiter(s) IV Push every 8 hours  insulin lispro (HumaLOG) corrective regimen sliding scale   SubCutaneous three times a day before meals  dextrose 5%. 1000 milliLiter(s) (50 mL/Hr) IV Continuous <Continuous>  dextrose 50% Injectable 12.5 Gram(s) IV Push once  dextrose 50% Injectable 25 Gram(s) IV Push once  dextrose 50% Injectable 25 Gram(s) IV Push once  aspirin enteric coated 81 milliGRAM(s) Oral daily  insulin glargine Injectable (LANTUS) 10 Unit(s) SubCutaneous at bedtime  atorvastatin 40 milliGRAM(s) Oral at bedtime  metoprolol 50 milliGRAM(s) Oral two times a day  heparin  Infusion.  Unit(s)/Hr (20 mL/Hr) IV Continuous <Continuous>      LABS:	 	    CARDIAC MARKERS:                                13.4   7.83  )-----------( 213      ( 16 Aug 2017 07:30 )             41.3     08-16    140  |  99  |  24<H>  ----------------------------<  99  3.9   |  26  |  1.21    Ca    9.5      16 Aug 2017 07:30  Phos  4.3     08-16  Mg     2.1     08-16    TPro  7.2  /  Alb  3.7  /  TBili  0.5  /  DBili  x   /  AST  18  /  ALT  22  /  AlkPhos  80  08-15    proBNP:   Lipid Profile:   HgA1c:   TSH:       PHYSICAL EXAM:  T(C): 36.3 (08-16-17 @ 14:50), Max: 36.4 (08-15-17 @ 19:26)  HR: 47 (08-16-17 @ 14:50) (47 - 71)  BP: 134/74 (08-16-17 @ 14:50) (96/62 - 134/74)  RR: 18 (08-16-17 @ 14:50) (16 - 18)  SpO2: 98% (08-16-17 @ 14:50) (95% - 99%)  Wt(kg): --  I&O's Summary    Height (cm): 182.88 (08-15 @ 19:26)  Weight (kg): 112.8 (08-15 @ 19:26)  BMI (kg/m2): 33.7 (08-15 @ 19:26)  BSA (m2): 2.34 (08-15 @ 19:26)    Appearance: Normal	  HEENT:   Normal oral mucosa, PERRL, EOMI	  Lymphatic: No lymphadenopathy , no edema  Cardiovascular: Normal S1 S2, No JVD, No murmurs , Peripheral pulses palpable 2+ bilaterally  Respiratory: Lungs clear to auscultation, normal effort 	  Gastrointestinal:  Soft, Non-tender, + BS	  Skin: No rashes, No ecchymoses, No cyanosis, warm to touch  Musculoskeletal: Normal range of motion, normal strength  Psychiatry:  Mood & affect appropriate    TELEMETRY: SR    ECG:  	  RADIOLOGY:   DIAGNOSTIC TESTING:  [ ] Echocardiogram:  [ ]  Catheterization:  [ ] Stress Test:    OTHER: 	      ASSESSMENT/PLAN: 	83y Male with h/o CAD s/p CABG, AF, HTN, HLD admitted with exertional angina.   -Cath with severe distal LM, ostial Cx disease  -Plan for PCI to protected LM tomorrow  -NPO past midnight for cath tomorrow   -Continue with hep gtt for paf    Temitope Nuñez MD  [facility]  [address] [facility], [address]  [place] [address]  office: [phone]  pager: [phone]

## 2017-08-16 NOTE — PROVIDER CONTACT NOTE (CRITICAL VALUE NOTIFICATION) - ACTION/TREATMENT ORDERED:
Per nomogram, heparin held for 60 minutes. Rate to be decreased to 16ml/hr when restarted. Continue to monitor patient. Awaiting callback from PA. Per nomogram, heparin held for 60 minutes. Rate to be decreased to 16ml/hr when restarted. Continue to monitor patient. Awaiting callback from PA.    Updated 18:16. Tele Marbin Perez aware of PTT. Continue to monitor per nomogram.

## 2017-08-17 LAB
APTT BLD: 106 SEC — HIGH (ref 27.5–37.4)
APTT BLD: 50.5 SEC — HIGH (ref 27.5–37.4)
APTT BLD: 97.9 SEC — HIGH (ref 27.5–37.4)
APTT BLD: > 200 SEC — CRITICAL HIGH (ref 27.5–37.4)
BUN SERPL-MCNC: 27 MG/DL — HIGH (ref 7–23)
CALCIUM SERPL-MCNC: 9.5 MG/DL — SIGNIFICANT CHANGE UP (ref 8.4–10.5)
CHLORIDE SERPL-SCNC: 103 MMOL/L — SIGNIFICANT CHANGE UP (ref 98–107)
CO2 SERPL-SCNC: 22 MMOL/L — SIGNIFICANT CHANGE UP (ref 22–31)
CREAT SERPL-MCNC: 1.29 MG/DL — SIGNIFICANT CHANGE UP (ref 0.5–1.3)
GLUCOSE SERPL-MCNC: 113 MG/DL — HIGH (ref 70–99)
HCT VFR BLD CALC: 40.4 % — SIGNIFICANT CHANGE UP (ref 39–50)
HGB BLD-MCNC: 13.5 G/DL — SIGNIFICANT CHANGE UP (ref 13–17)
MCHC RBC-ENTMCNC: 29.9 PG — SIGNIFICANT CHANGE UP (ref 27–34)
MCHC RBC-ENTMCNC: 33.4 % — SIGNIFICANT CHANGE UP (ref 32–36)
MCV RBC AUTO: 89.4 FL — SIGNIFICANT CHANGE UP (ref 80–100)
NRBC # FLD: 0 — SIGNIFICANT CHANGE UP
PLATELET # BLD AUTO: 212 K/UL — SIGNIFICANT CHANGE UP (ref 150–400)
PMV BLD: 10.9 FL — SIGNIFICANT CHANGE UP (ref 7–13)
POTASSIUM SERPL-MCNC: 4 MMOL/L — SIGNIFICANT CHANGE UP (ref 3.5–5.3)
POTASSIUM SERPL-SCNC: 4 MMOL/L — SIGNIFICANT CHANGE UP (ref 3.5–5.3)
RBC # BLD: 4.52 M/UL — SIGNIFICANT CHANGE UP (ref 4.2–5.8)
RBC # FLD: 13.7 % — SIGNIFICANT CHANGE UP (ref 10.3–14.5)
SODIUM SERPL-SCNC: 142 MMOL/L — SIGNIFICANT CHANGE UP (ref 135–145)
WBC # BLD: 8.06 K/UL — SIGNIFICANT CHANGE UP (ref 3.8–10.5)
WBC # FLD AUTO: 8.06 K/UL — SIGNIFICANT CHANGE UP (ref 3.8–10.5)

## 2017-08-17 RX ADMIN — HEPARIN SODIUM 1000 UNIT(S)/HR: 5000 INJECTION INTRAVENOUS; SUBCUTANEOUS at 15:51

## 2017-08-17 RX ADMIN — HEPARIN SODIUM 0 UNIT(S)/HR: 5000 INJECTION INTRAVENOUS; SUBCUTANEOUS at 00:53

## 2017-08-17 RX ADMIN — INSULIN GLARGINE 10 UNIT(S): 100 INJECTION, SOLUTION SUBCUTANEOUS at 22:04

## 2017-08-17 RX ADMIN — SODIUM CHLORIDE 3 MILLILITER(S): 9 INJECTION INTRAMUSCULAR; INTRAVENOUS; SUBCUTANEOUS at 22:00

## 2017-08-17 RX ADMIN — Medication 81 MILLIGRAM(S): at 13:01

## 2017-08-17 RX ADMIN — HEPARIN SODIUM 1200 UNIT(S)/HR: 5000 INJECTION INTRAVENOUS; SUBCUTANEOUS at 09:12

## 2017-08-17 RX ADMIN — SODIUM CHLORIDE 3 MILLILITER(S): 9 INJECTION INTRAMUSCULAR; INTRAVENOUS; SUBCUTANEOUS at 13:02

## 2017-08-17 RX ADMIN — Medication 50 MILLIGRAM(S): at 05:42

## 2017-08-17 RX ADMIN — HEPARIN SODIUM 1200 UNIT(S)/HR: 5000 INJECTION INTRAVENOUS; SUBCUTANEOUS at 01:56

## 2017-08-17 RX ADMIN — HEPARIN SODIUM 1200 UNIT(S)/HR: 5000 INJECTION INTRAVENOUS; SUBCUTANEOUS at 22:29

## 2017-08-17 RX ADMIN — ATORVASTATIN CALCIUM 40 MILLIGRAM(S): 80 TABLET, FILM COATED ORAL at 22:04

## 2017-08-17 RX ADMIN — SODIUM CHLORIDE 3 MILLILITER(S): 9 INJECTION INTRAMUSCULAR; INTRAVENOUS; SUBCUTANEOUS at 05:42

## 2017-08-17 RX ADMIN — HEPARIN SODIUM 4500 UNIT(S): 5000 INJECTION INTRAVENOUS; SUBCUTANEOUS at 22:32

## 2017-08-17 NOTE — PROVIDER CONTACT NOTE (CRITICAL VALUE NOTIFICATION) - BACKGROUND
82 year old male with HTN, hyperlipidemia, DM-II, know CAD with CABG 2011 who presented to [facility] ED 8/11/17 complaining of SOB, R/O MI with abnormal subsequent nuclear stress test. Transferred to [facility] for cath  +PAF on heparin

## 2017-08-17 NOTE — PROGRESS NOTE ADULT - SUBJECTIVE AND OBJECTIVE BOX
NEPHROLOGY-NSN [phone]        Patient seen and examined in bed.  He felt well.  Heparin gtt        MEDICATIONS  (STANDING):  sodium chloride 0.9% lock flush 3 milliLiter(s) IV Push every 8 hours  insulin lispro (HumaLOG) corrective regimen sliding scale   SubCutaneous three times a day before meals  dextrose 5%. 1000 milliLiter(s) (50 mL/Hr) IV Continuous <Continuous>  dextrose 50% Injectable 12.5 Gram(s) IV Push once  dextrose 50% Injectable 25 Gram(s) IV Push once  dextrose 50% Injectable 25 Gram(s) IV Push once  aspirin enteric coated 81 milliGRAM(s) Oral daily  insulin glargine Injectable (LANTUS) 10 Unit(s) SubCutaneous at bedtime  atorvastatin 40 milliGRAM(s) Oral at bedtime  metoprolol 50 milliGRAM(s) Oral two times a day  heparin  Infusion.  Unit(s)/Hr (20 mL/Hr) IV Continuous <Continuous>      VITAL:  T(C): , Max: 37 (08-16-17 @ 19:04)  T(F): , Max: 98.6 (08-16-17 @ 19:04)  HR: 55 (08-17-17 @ 14:48)  BP: 135/69 (08-17-17 @ 14:48)  BP(mean): --  RR: 18 (08-17-17 @ 14:48)  SpO2: 97% (08-17-17 @ 14:48)  Wt(kg): --    I and O's:        PHYSICAL EXAM:    Constitutional: NAD  HEENT: PERRLA    Neck:  No JVD  Respiratory: CTAB/L  Cardiovascular: S1 and S2  Gastrointestinal: BS+, soft, NT/ND  Extremities: No peripheral edema  Neurological: A/O x 3, no focal deficits  Psychiatric: Normal mood, normal affect  : No Rosenbaum  Skin: No rashes  Access: Not applicable    LABS:                        13.5   8.06  )-----------( 212      ( 17 Aug 2017 07:40 )             40.4     08-17    142  |  103  |  27<H>  ----------------------------<  113<H>  4.0   |  22  |  1.29    Ca    9.5      17 Aug 2017 07:40  Phos  4.3     08-16  Mg     2.1     08-16            Urine Studies:          RADIOLOGY & ADDITIONAL STUDIES:

## 2017-08-17 NOTE — PROGRESS NOTE ADULT - SUBJECTIVE AND OBJECTIVE BOX
Subjective: No chest pain or sob       MEDICATIONS  (STANDING):  sodium chloride 0.9% lock flush 3 milliLiter(s) IV Push every 8 hours  insulin lispro (HumaLOG) corrective regimen sliding scale   SubCutaneous three times a day before meals  dextrose 5%. 1000 milliLiter(s) (50 mL/Hr) IV Continuous <Continuous>  dextrose 50% Injectable 12.5 Gram(s) IV Push once  dextrose 50% Injectable 25 Gram(s) IV Push once  dextrose 50% Injectable 25 Gram(s) IV Push once  aspirin enteric coated 81 milliGRAM(s) Oral daily  insulin glargine Injectable (LANTUS) 10 Unit(s) SubCutaneous at bedtime  atorvastatin 40 milliGRAM(s) Oral at bedtime  metoprolol 50 milliGRAM(s) Oral two times a day  heparin  Infusion.  Unit(s)/Hr (20 mL/Hr) IV Continuous <Continuous>    MEDICATIONS  (PRN):  dextrose Gel 1 Dose(s) Oral once PRN Blood Glucose LESS THAN 70 milliGRAM(s)/deciliter  glucagon  Injectable 1 milliGRAM(s) IntraMuscular once PRN Glucose LESS THAN 70 milligrams/deciliter  heparin  Injectable 9000 Unit(s) IV Push every 6 hours PRN For aPTT less than 40  heparin  Injectable 4500 Unit(s) IV Push every 6 hours PRN For aPTT between 40 - 57      LABS:                        13.5   8.06  )-----------( 212      ( 17 Aug 2017 07:40 )             40.4     Hemoglobin: 13.5 g/dL (08-17 @ 07:40)  Hemoglobin: 13.4 g/dL (08-16 @ 07:30)  Hemoglobin: 14.1 g/dL (08-15 @ 11:33)  Hemoglobin: 12.4 g/dL (08-14 @ 06:48)  Hemoglobin: 11.7 g/dL (08-13 @ 06:26)    08-17    142  |  103  |  27<H>  ----------------------------<  113<H>  4.0   |  22  |  1.29    Ca    9.5      17 Aug 2017 07:40  Phos  4.3     08-16  Mg     2.1     08-16      Creatinine Trend: 1.29<--, 1.21<--, 1.36<--, 1.23<--, 1.23<--, 1.28<--   PTT - ( 17 Aug 2017 15:10 )  PTT:106.0 SEC        PHYSICAL EXAM  Vital Signs Last 24 Hrs  T(C): 36.5 (17 Aug 2017 14:48), Max: 37 (16 Aug 2017 19:04)  T(F): 97.7 (17 Aug 2017 14:48), Max: 98.6 (16 Aug 2017 19:04)  HR: 55 (17 Aug 2017 14:48) (43 - 55)  BP: 135/69 (17 Aug 2017 14:48) (128/64 - 158/84)  BP(mean): --  RR: 18 (17 Aug 2017 14:48) (16 - 18)  SpO2: 97% (17 Aug 2017 14:48) (95% - 98%)    Cardiovascular: Normal S1 S2,RRR  No JVD, No murmurs , Peripheral pulses palpable 2+ bilaterally  Respiratory: Lungs clear to auscultation, normal effort 	  Gastrointestinal:  Soft, Non-tender, + BS	  Extremities no clubbing, cyanosis or edema B/L LE's     TELEMETRY: SR    ECG:  	  RADIOLOGY:   DIAGNOSTIC TESTING:  [x ] Echocardiogram: 8/12   1. Normal mitral valve. Mild mitral regurgitation.  2. Calcified trileaflet aortic valve with normal opening.  3. Aortic Root: 4.0 cm.  4. Moderate left atrial enlargement.  5. Moderate concentric left ventricular hypertrophy.  6. Moderate global left ventricular dysfunction (EF=40-45%).  7. Grade III diastolic dysfunction  8. Normal right atrium.  9. Normal right ventricular size and function.  10. RV systolic pressure is moderately increased (PASP 48mm Hg).  11. There is mild tricuspid regurgitation.  12. There is mild pulmonic regurgitation.  13. Normal pericardium with no pericardial effusion.    [x ]  Catheterization: severe distal LM, ostial Cx disease  [x ] Stress Test: 8/14     There is a medium sized, mild to moderate defect in the  inferior wall that is partially reversible, suggestive of  infarction with moderate steffi-infarct ischemia.  There is  a small, mild defect in the anterior wall that is  reversible, suggestive of ischemia.  There is a small,  mild to moderate defect in the lateral wall that is  partially reversible, suggestive of infarction with  mild-moderate steffi-infarct ischemia.  * Post-stress resting myocardial perfusion gated SPECT  imaging was performed (LVEF = 51 %).    OTHER: 	      ASSESSMENT/PLAN: 	83y Male with h/o CAD s/p CABG, AF, HTN, HLD admitted with exertional angina.  - Stress test positive as noted above    -Cath with severe distal LM, ostial Cx disease  -Plan for PCI to protected LM  possibly tomorrow  -NPO past midnight for cath tomorrow   -Continue with hep gtt for paf

## 2017-08-17 NOTE — PROGRESS NOTE ADULT - ASSESSMENT
The patient is a natanael 82-year-old gentleman with past medical history of diabetes, hypertension, and coronary artery disease, status post coronary artery bypass graft, who presents with positive stress test, new decrease in ejection fraction, and new onset atrial fibrillation.    CKD stage 3    1.	Renal.  No renal objections to repeat angiogram in am.  No evidence of ANNETTA at present.  No need for Mucomyst at present.  2.	Cardiology.  Heart rate control per Cardiology.  Losartan was DC as was the HCTZ.  3.	Endocrine.  Awaiting TSH with next blood draw. The patient is a natanael 82-year-old gentleman with past medical history of diabetes, hypertension, and coronary artery disease, status post coronary artery bypass graft, who presents with positive stress test, new decrease in ejection fraction, and new onset atrial fibrillation.    CKD stage 3    1.	Renal.  No renal objections to repeat angiogram in am.  No evidence of ANNETTA at present.  No need for Mucomyst at present.  2.	Cardiology.  Heart rate control per Cardiology.  Losartan was DC as was the HCTZ.  3.	Endocrine.  TSH was within normal limits.

## 2017-08-18 DIAGNOSIS — E11.9 TYPE 2 DIABETES MELLITUS WITHOUT COMPLICATIONS: ICD-10-CM

## 2017-08-18 DIAGNOSIS — I48.0 PAROXYSMAL ATRIAL FIBRILLATION: ICD-10-CM

## 2017-08-18 DIAGNOSIS — S30.1XXA CONTUSION OF ABDOMINAL WALL, INITIAL ENCOUNTER: ICD-10-CM

## 2017-08-18 DIAGNOSIS — I25.118 ATHEROSCLEROTIC HEART DISEASE OF NATIVE CORONARY ARTERY WITH OTHER FORMS OF ANGINA PECTORIS: ICD-10-CM

## 2017-08-18 LAB
APTT BLD: 136.5 SEC — CRITICAL HIGH (ref 27.5–37.4)
BASOPHILS # BLD AUTO: 0.02 K/UL — SIGNIFICANT CHANGE UP (ref 0–0.2)
BASOPHILS # BLD AUTO: 0.03 K/UL — SIGNIFICANT CHANGE UP (ref 0–0.2)
BASOPHILS NFR BLD AUTO: 0.2 % — SIGNIFICANT CHANGE UP (ref 0–2)
BASOPHILS NFR BLD AUTO: 0.3 % — SIGNIFICANT CHANGE UP (ref 0–2)
BUN SERPL-MCNC: 27 MG/DL — HIGH (ref 7–23)
CALCIUM SERPL-MCNC: 9.4 MG/DL — SIGNIFICANT CHANGE UP (ref 8.4–10.5)
CHLORIDE SERPL-SCNC: 101 MMOL/L — SIGNIFICANT CHANGE UP (ref 98–107)
CO2 SERPL-SCNC: 22 MMOL/L — SIGNIFICANT CHANGE UP (ref 22–31)
CREAT SERPL-MCNC: 1.24 MG/DL — SIGNIFICANT CHANGE UP (ref 0.5–1.3)
EOSINOPHIL # BLD AUTO: 0.09 K/UL — SIGNIFICANT CHANGE UP (ref 0–0.5)
EOSINOPHIL # BLD AUTO: 0.17 K/UL — SIGNIFICANT CHANGE UP (ref 0–0.5)
EOSINOPHIL NFR BLD AUTO: 0.9 % — SIGNIFICANT CHANGE UP (ref 0–6)
EOSINOPHIL NFR BLD AUTO: 1.8 % — SIGNIFICANT CHANGE UP (ref 0–6)
GLUCOSE SERPL-MCNC: 139 MG/DL — HIGH (ref 70–99)
HCT VFR BLD CALC: 39 % — SIGNIFICANT CHANGE UP (ref 39–50)
HCT VFR BLD CALC: 39.5 % — SIGNIFICANT CHANGE UP (ref 39–50)
HGB BLD-MCNC: 12.8 G/DL — LOW (ref 13–17)
HGB BLD-MCNC: 12.9 G/DL — LOW (ref 13–17)
IMM GRANULOCYTES # BLD AUTO: 0.02 # — SIGNIFICANT CHANGE UP
IMM GRANULOCYTES # BLD AUTO: 0.03 # — SIGNIFICANT CHANGE UP
IMM GRANULOCYTES NFR BLD AUTO: 0.2 % — SIGNIFICANT CHANGE UP (ref 0–1.5)
IMM GRANULOCYTES NFR BLD AUTO: 0.3 % — SIGNIFICANT CHANGE UP (ref 0–1.5)
INR BLD: 1.14 — SIGNIFICANT CHANGE UP (ref 0.88–1.17)
LYMPHOCYTES # BLD AUTO: 1.87 K/UL — SIGNIFICANT CHANGE UP (ref 1–3.3)
LYMPHOCYTES # BLD AUTO: 19 % — SIGNIFICANT CHANGE UP (ref 13–44)
LYMPHOCYTES # BLD AUTO: 2.1 K/UL — SIGNIFICANT CHANGE UP (ref 1–3.3)
LYMPHOCYTES # BLD AUTO: 22.8 % — SIGNIFICANT CHANGE UP (ref 13–44)
MAGNESIUM SERPL-MCNC: 2 MG/DL — SIGNIFICANT CHANGE UP (ref 1.6–2.6)
MCHC RBC-ENTMCNC: 29.1 PG — SIGNIFICANT CHANGE UP (ref 27–34)
MCHC RBC-ENTMCNC: 29.3 PG — SIGNIFICANT CHANGE UP (ref 27–34)
MCHC RBC-ENTMCNC: 32.4 % — SIGNIFICANT CHANGE UP (ref 32–36)
MCHC RBC-ENTMCNC: 33.1 % — SIGNIFICANT CHANGE UP (ref 32–36)
MCV RBC AUTO: 88 FL — SIGNIFICANT CHANGE UP (ref 80–100)
MCV RBC AUTO: 90.4 FL — SIGNIFICANT CHANGE UP (ref 80–100)
MONOCYTES # BLD AUTO: 0.91 K/UL — HIGH (ref 0–0.9)
MONOCYTES # BLD AUTO: 1.09 K/UL — HIGH (ref 0–0.9)
MONOCYTES NFR BLD AUTO: 11.1 % — SIGNIFICANT CHANGE UP (ref 2–14)
MONOCYTES NFR BLD AUTO: 9.9 % — SIGNIFICANT CHANGE UP (ref 2–14)
NEUTROPHILS # BLD AUTO: 5.96 K/UL — SIGNIFICANT CHANGE UP (ref 1.8–7.4)
NEUTROPHILS # BLD AUTO: 6.73 K/UL — SIGNIFICANT CHANGE UP (ref 1.8–7.4)
NEUTROPHILS NFR BLD AUTO: 64.9 % — SIGNIFICANT CHANGE UP (ref 43–77)
NEUTROPHILS NFR BLD AUTO: 68.6 % — SIGNIFICANT CHANGE UP (ref 43–77)
NRBC # FLD: 0 — SIGNIFICANT CHANGE UP
NRBC # FLD: 0 — SIGNIFICANT CHANGE UP
PHOSPHATE SERPL-MCNC: 3.3 MG/DL — SIGNIFICANT CHANGE UP (ref 2.5–4.5)
PLATELET # BLD AUTO: 197 K/UL — SIGNIFICANT CHANGE UP (ref 150–400)
PLATELET # BLD AUTO: 212 K/UL — SIGNIFICANT CHANGE UP (ref 150–400)
PMV BLD: 10.6 FL — SIGNIFICANT CHANGE UP (ref 7–13)
PMV BLD: 11 FL — SIGNIFICANT CHANGE UP (ref 7–13)
POTASSIUM SERPL-MCNC: 3.9 MMOL/L — SIGNIFICANT CHANGE UP (ref 3.5–5.3)
POTASSIUM SERPL-SCNC: 3.9 MMOL/L — SIGNIFICANT CHANGE UP (ref 3.5–5.3)
PROTHROM AB SERPL-ACNC: 12.8 SEC — SIGNIFICANT CHANGE UP (ref 9.8–13.1)
RBC # BLD: 4.37 M/UL — SIGNIFICANT CHANGE UP (ref 4.2–5.8)
RBC # BLD: 4.43 M/UL — SIGNIFICANT CHANGE UP (ref 4.2–5.8)
RBC # FLD: 13.4 % — SIGNIFICANT CHANGE UP (ref 10.3–14.5)
RBC # FLD: 13.6 % — SIGNIFICANT CHANGE UP (ref 10.3–14.5)
SODIUM SERPL-SCNC: 139 MMOL/L — SIGNIFICANT CHANGE UP (ref 135–145)
WBC # BLD: 9.2 K/UL — SIGNIFICANT CHANGE UP (ref 3.8–10.5)
WBC # BLD: 9.82 K/UL — SIGNIFICANT CHANGE UP (ref 3.8–10.5)
WBC # FLD AUTO: 9.2 K/UL — SIGNIFICANT CHANGE UP (ref 3.8–10.5)
WBC # FLD AUTO: 9.82 K/UL — SIGNIFICANT CHANGE UP (ref 3.8–10.5)

## 2017-08-18 PROCEDURE — 93926 LOWER EXTREMITY STUDY: CPT | Mod: 26,RT

## 2017-08-18 PROCEDURE — 99233 SBSQ HOSP IP/OBS HIGH 50: CPT

## 2017-08-18 PROCEDURE — 93926 LOWER EXTREMITY STUDY: CPT | Mod: 26,LT

## 2017-08-18 RX ORDER — MORPHINE SULFATE 50 MG/1
2 CAPSULE, EXTENDED RELEASE ORAL ONCE
Qty: 0 | Refills: 0 | Status: DISCONTINUED | OUTPATIENT
Start: 2017-08-18 | End: 2017-08-18

## 2017-08-18 RX ORDER — OXYCODONE AND ACETAMINOPHEN 5; 325 MG/1; MG/1
1 TABLET ORAL EVERY 6 HOURS
Qty: 0 | Refills: 0 | Status: DISCONTINUED | OUTPATIENT
Start: 2017-08-18 | End: 2017-08-18

## 2017-08-18 RX ORDER — ACETAMINOPHEN 500 MG
650 TABLET ORAL EVERY 6 HOURS
Qty: 0 | Refills: 0 | Status: DISCONTINUED | OUTPATIENT
Start: 2017-08-18 | End: 2017-08-25

## 2017-08-18 RX ADMIN — ATORVASTATIN CALCIUM 40 MILLIGRAM(S): 80 TABLET, FILM COATED ORAL at 22:35

## 2017-08-18 RX ADMIN — Medication 81 MILLIGRAM(S): at 11:10

## 2017-08-18 RX ADMIN — Medication 650 MILLIGRAM(S): at 05:05

## 2017-08-18 RX ADMIN — OXYCODONE AND ACETAMINOPHEN 1 TABLET(S): 5; 325 TABLET ORAL at 05:06

## 2017-08-18 RX ADMIN — OXYCODONE AND ACETAMINOPHEN 1 TABLET(S): 5; 325 TABLET ORAL at 06:04

## 2017-08-18 RX ADMIN — Medication 50 MILLIGRAM(S): at 05:06

## 2017-08-18 RX ADMIN — MORPHINE SULFATE 2 MILLIGRAM(S): 50 CAPSULE, EXTENDED RELEASE ORAL at 06:40

## 2017-08-18 RX ADMIN — SODIUM CHLORIDE 3 MILLILITER(S): 9 INJECTION INTRAMUSCULAR; INTRAVENOUS; SUBCUTANEOUS at 13:02

## 2017-08-18 RX ADMIN — SODIUM CHLORIDE 3 MILLILITER(S): 9 INJECTION INTRAMUSCULAR; INTRAVENOUS; SUBCUTANEOUS at 05:06

## 2017-08-18 RX ADMIN — INSULIN GLARGINE 10 UNIT(S): 100 INJECTION, SOLUTION SUBCUTANEOUS at 22:35

## 2017-08-18 RX ADMIN — SODIUM CHLORIDE 3 MILLILITER(S): 9 INJECTION INTRAMUSCULAR; INTRAVENOUS; SUBCUTANEOUS at 21:38

## 2017-08-18 RX ADMIN — MORPHINE SULFATE 2 MILLIGRAM(S): 50 CAPSULE, EXTENDED RELEASE ORAL at 06:25

## 2017-08-18 RX ADMIN — Medication 650 MILLIGRAM(S): at 04:10

## 2017-08-18 NOTE — PROGRESS NOTE ADULT - ASSESSMENT
The patient is a natanael 82-year-old gentleman with past medical history of diabetes, hypertension, and coronary artery disease, status post coronary artery bypass graft, who presents with positive stress test, new decrease in ejection fraction, and new onset atrial fibrillation.    CKD stage 3  right groin hematoma    1.	Renal.    No evidence of ANNETTA at present.  Stabel renal function  2.	Cardiology.  Heart rate control per Cardiology.  Losartan was DC as was the HCTZ.  Heparin gtt is off at present due to hematoma;  Cath timing per cards  3.	Endocrine.  TSH was within normal limits.

## 2017-08-18 NOTE — CONSULT NOTE PEDS - ASSESSMENT
82yo M with R groin hematoma s/p cardiac cath on 8/15/17. Pt has been on heparin gtt, which was just discontinued this morning. There is a possibility of pseudoaneurysm. The hematoma does not seem to be rapidly expanding, although the nurse reports it has enlarged over the past 10-12h.     - VA duplex arterial RLE r/o pseudoaneurysm  - Hold heparin  - Compression with saline bag  - Pain control as needed  - D/W fellow on call. To be discussed with Dr. Meyer this morning.       Vascular Surgery - Dr. Meyer  C team Surgery  [id number]

## 2017-08-18 NOTE — PROGRESS NOTE ADULT - PROBLEM SELECTOR PLAN 4
hematoma appreciated in the R groin -- 7 cm x 4.5 cm,   --> discussed with Dr Clark;  --> as per discussion, heparin stopped, CT a/p and US ordered-- to R/O RP bleed and to evaluate for the size of hematoma, vascular consult,   --> vascular MD on call, Dr Garcia, called;  follow recommendations,

## 2017-08-18 NOTE — PROGRESS NOTE ADULT - SUBJECTIVE AND OBJECTIVE BOX
NEPHROLOGY-[facility] [phone]        Patient seen and examined in bed.  He had a right groin hematoma and heparin gtt is off        MEDICATIONS  (STANDING):  sodium chloride 0.9% lock flush 3 milliLiter(s) IV Push every 8 hours  insulin lispro (HumaLOG) corrective regimen sliding scale   SubCutaneous three times a day before meals  dextrose 5%. 1000 milliLiter(s) (50 mL/Hr) IV Continuous <Continuous>  dextrose 50% Injectable 12.5 Gram(s) IV Push once  dextrose 50% Injectable 25 Gram(s) IV Push once  dextrose 50% Injectable 25 Gram(s) IV Push once  aspirin enteric coated 81 milliGRAM(s) Oral daily  insulin glargine Injectable (LANTUS) 10 Unit(s) SubCutaneous at bedtime  atorvastatin 40 milliGRAM(s) Oral at bedtime  metoprolol 50 milliGRAM(s) Oral two times a day      VITAL:  T(C): , Max: 37.2 (08-17-17 @ 22:00)  T(F): , Max: 98.9 (08-17-17 @ 22:00)  HR: 46 (08-18-17 @ 13:45)  BP: 123/66 (08-18-17 @ 13:45)  BP(mean): --  RR: 18 (08-18-17 @ 13:45)  SpO2: 97% (08-18-17 @ 13:45)  Wt(kg): --    I and O's:        PHYSICAL EXAM:    Constitutional: NAD  HEENT: PERRLA    Neck:  No JVD  Respiratory: CTAB/L  Cardiovascular: S1 and S2  Gastrointestinal: BS+, soft, NT/ND  Extremities: No peripheral edema  Neurological: A/O x 3, no focal deficits  Psychiatric: Normal mood, normal affect  : No Rosenbaum  Skin: No rashes  Access: Not applicable    LABS:                        12.9   9.20  )-----------( 197      ( 18 Aug 2017 04:08 )             39.0     08-18    139  |  101  |  27<H>  ----------------------------<  139<H>  3.9   |  22  |  1.24    Ca    9.4      18 Aug 2017 04:08  Phos  3.3     08-18  Mg     2.0     08-18

## 2017-08-18 NOTE — PROGRESS NOTE ADULT - ASSESSMENT
Patient is a 83y old  Male who c/o R thigh pain. Pt states, that pain started 10 min ago, intermittent, 10/10. Pt denies any prior episodes/other complaints.         MEDICATIONS  (STANDING):  sodium chloride 0.9% lock flush 3 milliLiter(s) IV Push every 8 hours  insulin lispro (HumaLOG) corrective regimen sliding scale   SubCutaneous three times a day before meals  dextrose 5%. 1000 milliLiter(s) (50 mL/Hr) IV Continuous <Continuous>  dextrose 50% Injectable 12.5 Gram(s) IV Push once  dextrose 50% Injectable 25 Gram(s) IV Push once  dextrose 50% Injectable 25 Gram(s) IV Push once  aspirin enteric coated 81 milliGRAM(s) Oral daily  insulin glargine Injectable (LANTUS) 10 Unit(s) SubCutaneous at bedtime  atorvastatin 40 milliGRAM(s) Oral at bedtime  metoprolol 50 milliGRAM(s) Oral two times a day    MEDICATIONS  (PRN):  dextrose Gel 1 Dose(s) Oral once PRN Blood Glucose LESS THAN 70 milliGRAM(s)/deciliter  glucagon  Injectable 1 milliGRAM(s) IntraMuscular once PRN Glucose LESS THAN 70 milligrams/deciliter  acetaminophen   Tablet. 650 milliGRAM(s) Oral every 6 hours PRN Moderate Pain (4 - 6)  oxyCODONE    5 mG/acetaminophen 325 mG 1 Tablet(s) Oral every 6 hours PRN Severe Pain (7 - 10)            Vital Signs Last 24 Hrs  T(C): 37.2 (17 Aug 2017 22:00), Max: 37.2 (17 Aug 2017 22:00)  T(F): 98.9 (17 Aug 2017 22:00), Max: 98.9 (17 Aug 2017 22:00)  HR: 55 (17 Aug 2017 22:00) (43 - 55)  BP: 145/71 (17 Aug 2017 22:00) (128/64 - 157/80)  BP(mean): --  RR: 18 (17 Aug 2017 22:00) (16 - 18)  SpO2: 96% (17 Aug 2017 22:00) (95% - 98%)          INTERPRETATION OF TELEMETRY:    ECG:        LABS:                        12.9   9.20  )-----------( 197      ( 18 Aug 2017 04:08 )             39.0     08-17    142  |  103  |  27<H>  ----------------------------<  113<H>  4.0   |  22  |  1.29    Ca    9.5      17 Aug 2017 07:40  Phos  4.3     08-16  Mg     2.1     08-16          PTT - ( 17 Aug 2017 21:30 )  PTT:50.5 SEC    I&O's Summary    BNP    RADIOLOGY & ADDITIONAL STUDIES:        Physical Exam:  General; Pt appears well and in mild distress   HEENT:   Normal oral mucosa, PERRL, EOMI	  Lymphatic: No lymphadenopathy , no edema  Cardiovascular: Normal S1 S2, No JVD, No murmurs , Peripheral pulses palpable 2+ bilaterally  Respiratory: Lungs clear to auscultation, normal effort 	  Gastrointestinal:  Soft, Non-tender, + BS	  Skin: No rashes, No ecchymoses, No cyanosis, warm to touch  Musculoskeletal: Normal range of motion, normal strength  Extremities; R groin -- 7 x 4.5 cm hematoma, tender to palpation   Psychiatry:  Mood & affect appropriate

## 2017-08-18 NOTE — PROVIDER CONTACT NOTE (CRITICAL VALUE NOTIFICATION) - ASSESSMENT
Patient free of signs of bleeding, no change in mental status noted.
Heparin drip running at 12 units/hr. Patient complained of intermittent pain to right groin area radiating to ankle. Hematoma noted. Heparin drip stopped as per order.
Patient has heparin drip running at16u/hr.

## 2017-08-18 NOTE — PROGRESS NOTE ADULT - SUBJECTIVE AND OBJECTIVE BOX
Subjective: No chest pain or sob R groin feeling better  had c/o intermittent pain o/n    MEDICATIONS  (STANDING):  sodium chloride 0.9% lock flush 3 milliLiter(s) IV Push every 8 hours  insulin lispro (HumaLOG) corrective regimen sliding scale   SubCutaneous three times a day before meals  dextrose 5%. 1000 milliLiter(s) (50 mL/Hr) IV Continuous <Continuous>  dextrose 50% Injectable 12.5 Gram(s) IV Push once  dextrose 50% Injectable 25 Gram(s) IV Push once  dextrose 50% Injectable 25 Gram(s) IV Push once  aspirin enteric coated 81 milliGRAM(s) Oral daily  insulin glargine Injectable (LANTUS) 10 Unit(s) SubCutaneous at bedtime  atorvastatin 40 milliGRAM(s) Oral at bedtime  metoprolol 50 milliGRAM(s) Oral two times a day    MEDICATIONS  (PRN):  dextrose Gel 1 Dose(s) Oral once PRN Blood Glucose LESS THAN 70 milliGRAM(s)/deciliter  glucagon  Injectable 1 milliGRAM(s) IntraMuscular once PRN Glucose LESS THAN 70 milligrams/deciliter  acetaminophen   Tablet. 650 milliGRAM(s) Oral every 6 hours PRN Moderate Pain (4 - 6)  oxyCODONE    5 mG/acetaminophen 325 mG 1 Tablet(s) Oral every 6 hours PRN Severe Pain (7 - 10)      LABS:                        12.9   9.20  )-----------( 197      ( 18 Aug 2017 04:08 )             39.0     Hemoglobin: 12.9 g/dL (08-18 @ 04:08)  Hemoglobin: 13.5 g/dL (08-17 @ 07:40)  Hemoglobin: 13.4 g/dL (08-16 @ 07:30)  Hemoglobin: 14.1 g/dL (08-15 @ 11:33)  Hemoglobin: 12.4 g/dL (08-14 @ 06:48)    08-18    139  |  101  |  27<H>  ----------------------------<  139<H>  3.9   |  22  |  1.24    Ca    9.4      18 Aug 2017 04:08  Phos  3.3     08-18  Mg     2.0     08-18      Creatinine Trend: 1.24<--, 1.29<--, 1.21<--, 1.36<--, 1.23<--, 1.23<--   PT/INR - ( 18 Aug 2017 04:08 )   PT: 12.8 SEC;   INR: 1.14          PTT - ( 18 Aug 2017 04:08 )  PTT:136.5 SEC        PHYSICAL EXAM  Vital Signs Last 24 Hrs  T(C): 36.6 (18 Aug 2017 13:45), Max: 37.2 (17 Aug 2017 22:00)  T(F): 97.9 (18 Aug 2017 13:45), Max: 98.9 (17 Aug 2017 22:00)  HR: 46 (18 Aug 2017 13:45) (46 - 60)  BP: 123/66 (18 Aug 2017 13:45) (123/66 - 180/80)  BP(mean): --  RR: 18 (18 Aug 2017 13:45) (18 - 18)  SpO2: 97% (18 Aug 2017 13:45) (93% - 97%)      Cardiovascular: Normal S1 S2,RRR  No JVD, No murmurs , Peripheral pulses palpable 2+ bilaterally  Respiratory: Lungs clear to auscultation, normal effort 	  Gastrointestinal:  Soft, Non-tender, + BS	  Extremities no clubbing, cyanosis or edema B/L LE's  R groin w/ noted  hematoma and ecchymosis     TELEMETRY: SR    ECG:  	  RADIOLOGY:   DIAGNOSTIC TESTING:  [x ] Echocardiogram: 8/12   1. Normal mitral valve. Mild mitral regurgitation.  2. Calcified trileaflet aortic valve with normal opening.  3. Aortic Root: 4.0 cm.  4. Moderate left atrial enlargement.  5. Moderate concentric left ventricular hypertrophy.  6. Moderate global left ventricular dysfunction (EF=40-45%).  7. Grade III diastolic dysfunction  8. Normal right atrium.  9. Normal right ventricular size and function.  10. RV systolic pressure is moderately increased (PASP 48mm Hg).  11. There is mild tricuspid regurgitation.  12. There is mild pulmonic regurgitation.  13. Normal pericardium with no pericardial effusion.    [x ]  Catheterization: severe distal LM, ostial Cx disease  [x ] Stress Test: 8/14     There is a medium sized, mild to moderate defect in the  inferior wall that is partially reversible, suggestive of  infarction with moderate steffi-infarct ischemia.  There is  a small, mild defect in the anterior wall that is  reversible, suggestive of ischemia.  There is a small,  mild to moderate defect in the lateral wall that is  partially reversible, suggestive of infarction with  mild-moderate steffi-infarct ischemia.  * Post-stress resting myocardial perfusion gated SPECT  imaging was performed (LVEF = 51 %).    OTHER: 	  R Groin US     No evidence of pseudoaneurysm visualized  in the right inguinal region.  The right common femoral, proximal superficial femoral,  and proximal deep femoral arteries are otherwise patent,  and demonstrate normal velocities with triphasic  waveforms. No hemodynamically significant stenosis.  The right common femoral vein is patent and compressible  without evidence of thrombosis.    R Groin US   No pseudoaneurysm was identified. Extensive infiltrative   hematoma was noted.      ASSESSMENT/PLAN: 	83y Male with h/o CAD s/p CABG, AF, HTN, HLD admitted with exertional angina.  - Stress test positive as noted above    -Cath with severe distal LM, ostial Cx disease  -Plan for eventual  PCI to protected LM    -+ hematoma R groin w/ ecchymosis   -s/p groin US No pseudoaneurysm was identified  -Sx cs appreciated   -pressure dressing applied   -monitor H/H Subjective: No chest pain or sob R groin feeling better  had c/o intermittent pain o/n    MEDICATIONS  (STANDING):  sodium chloride 0.9% lock flush 3 milliLiter(s) IV Push every 8 hours  insulin lispro (HumaLOG) corrective regimen sliding scale   SubCutaneous three times a day before meals  dextrose 5%. 1000 milliLiter(s) (50 mL/Hr) IV Continuous <Continuous>  dextrose 50% Injectable 12.5 Gram(s) IV Push once  dextrose 50% Injectable 25 Gram(s) IV Push once  dextrose 50% Injectable 25 Gram(s) IV Push once  aspirin enteric coated 81 milliGRAM(s) Oral daily  insulin glargine Injectable (LANTUS) 10 Unit(s) SubCutaneous at bedtime  atorvastatin 40 milliGRAM(s) Oral at bedtime  metoprolol 50 milliGRAM(s) Oral two times a day    MEDICATIONS  (PRN):  dextrose Gel 1 Dose(s) Oral once PRN Blood Glucose LESS THAN 70 milliGRAM(s)/deciliter  glucagon  Injectable 1 milliGRAM(s) IntraMuscular once PRN Glucose LESS THAN 70 milligrams/deciliter  acetaminophen   Tablet. 650 milliGRAM(s) Oral every 6 hours PRN Moderate Pain (4 - 6)  oxyCODONE    5 mG/acetaminophen 325 mG 1 Tablet(s) Oral every 6 hours PRN Severe Pain (7 - 10)      LABS:                        12.9   9.20  )-----------( 197      ( 18 Aug 2017 04:08 )             39.0     Hemoglobin: 12.9 g/dL (08-18 @ 04:08)  Hemoglobin: 13.5 g/dL (08-17 @ 07:40)  Hemoglobin: 13.4 g/dL (08-16 @ 07:30)  Hemoglobin: 14.1 g/dL (08-15 @ 11:33)  Hemoglobin: 12.4 g/dL (08-14 @ 06:48)    08-18    139  |  101  |  27<H>  ----------------------------<  139<H>  3.9   |  22  |  1.24    Ca    9.4      18 Aug 2017 04:08  Phos  3.3     08-18  Mg     2.0     08-18      Creatinine Trend: 1.24<--, 1.29<--, 1.21<--, 1.36<--, 1.23<--, 1.23<--   PT/INR - ( 18 Aug 2017 04:08 )   PT: 12.8 SEC;   INR: 1.14          PTT - ( 18 Aug 2017 04:08 )  PTT:136.5 SEC        PHYSICAL EXAM  Vital Signs Last 24 Hrs  T(C): 36.6 (18 Aug 2017 13:45), Max: 37.2 (17 Aug 2017 22:00)  T(F): 97.9 (18 Aug 2017 13:45), Max: 98.9 (17 Aug 2017 22:00)  HR: 46 (18 Aug 2017 13:45) (46 - 60)  BP: 123/66 (18 Aug 2017 13:45) (123/66 - 180/80)  BP(mean): --  RR: 18 (18 Aug 2017 13:45) (18 - 18)  SpO2: 97% (18 Aug 2017 13:45) (93% - 97%)      Cardiovascular: Normal S1 S2,RRR  No JVD, No murmurs , Peripheral pulses palpable 2+ bilaterally  Respiratory: Lungs clear to auscultation, normal effort 	  Gastrointestinal:  Soft, Non-tender, + BS	  Extremities no clubbing, cyanosis or edema B/L LE's  R groin w/ noted  hematoma and ecchymosis     TELEMETRY: SR    ECG:  	  RADIOLOGY:   DIAGNOSTIC TESTING:  [x ] Echocardiogram: 8/12   1. Normal mitral valve. Mild mitral regurgitation.  2. Calcified trileaflet aortic valve with normal opening.  3. Aortic Root: 4.0 cm.  4. Moderate left atrial enlargement.  5. Moderate concentric left ventricular hypertrophy.  6. Moderate global left ventricular dysfunction (EF=40-45%).  7. Grade III diastolic dysfunction  8. Normal right atrium.  9. Normal right ventricular size and function.  10. RV systolic pressure is moderately increased (PASP 48mm Hg).  11. There is mild tricuspid regurgitation.  12. There is mild pulmonic regurgitation.  13. Normal pericardium with no pericardial effusion.    [x ]  Catheterization: severe distal LM, ostial Cx disease  [x ] Stress Test: 8/14     There is a medium sized, mild to moderate defect in the  inferior wall that is partially reversible, suggestive of  infarction with moderate steffi-infarct ischemia.  There is  a small, mild defect in the anterior wall that is  reversible, suggestive of ischemia.  There is a small,  mild to moderate defect in the lateral wall that is  partially reversible, suggestive of infarction with  mild-moderate steffi-infarct ischemia.  * Post-stress resting myocardial perfusion gated SPECT  imaging was performed (LVEF = 51 %).    OTHER: 	  R Groin US     No evidence of pseudoaneurysm visualized  in the right inguinal region.  The right common femoral, proximal superficial femoral,  and proximal deep femoral arteries are otherwise patent,  and demonstrate normal velocities with triphasic  waveforms. No hemodynamically significant stenosis.  The right common femoral vein is patent and compressible  without evidence of thrombosis.    R Groin US   No pseudoaneurysm was identified. Extensive infiltrative   hematoma was noted.      ASSESSMENT/PLAN: 	83y Male with h/o CAD s/p CABG, AF, HTN, HLD admitted with exertional angina.  - Stress test positive as noted above    -Cath with severe distal LM, ostial Cx disease  -Plan for eventual  PCI to protected LM    -+ hematoma R groin w/ ecchymosis   -s/p groin US No pseudoaneurysm was identified  -Sx cs appreciated   -pressure dressing applied   -monitor H/H & R groin

## 2017-08-18 NOTE — PROVIDER CONTACT NOTE (CRITICAL VALUE NOTIFICATION) - BACKGROUND
82 year old male with HTN, hyperlipidemia, DM-II, know CAD with CABG 2011 who presented to [facility] ED 8/11/17 complaining of SOB, R/O MI with abnormal subsequent nuclear stress test. Transferred to [facility] for cath.

## 2017-08-18 NOTE — CONSULT NOTE PEDS - SUBJECTIVE AND OBJECTIVE BOX
GENERAL SURGERY CONSULT NOTE    HPI:  82 year old male with HTN, hyperlipidemia, DM-II, know CAD with CABG 2011, admitted 8/15/17 with SOB and chest pressure, diagnosed with evolving coronary disease. He had a cardiac cath via the right femoral artery on 8/15 without complication until this morning. The pt reports noticing pain in his right leg, severe cramping pain in the thigh and groin, for the past two to three hours. The nurse noted a small amt of ecchymosis (outlined on dressing) at the beginning of her shift, however it has been increasing. She also noticed an area of firmness/hematoma which was new from the beginning of the shift.     The pt was on heparin for his acute coronary syndrome until the hematoma was identified early this morning.       PAST MEDICAL & SURGICAL HISTORY:  Atrial fibrillation  CAD (coronary artery disease)  HTN (hypertension)  DM (diabetes mellitus)  Dyslipidemia  History of Obesity  H/O Moh's micrographic surgery for skin cancer  History of cataract surgery    S/P CABG x 4: 2011 at [facility]      Systemic:	[ ] Fever	[ ] Chills	[ ] Night sweats    [ ] Fatigue	[ ] Other  [] Cardiovascular:  [] Pulmonary:  [] Renal/Urologic:  [] Gastrointestinal:   [] Metabolic:  [] Neurologic:  [] Hematologic:  [] ENT:  [] Ophthalmologic:  [x] Musculoskeletal: RLE cramp/pain      MEDICATIONS  (STANDING):  sodium chloride 0.9% lock flush 3 milliLiter(s) IV Push every 8 hours  insulin lispro (HumaLOG) corrective regimen sliding scale   SubCutaneous three times a day before meals  dextrose 5%. 1000 milliLiter(s) (50 mL/Hr) IV Continuous <Continuous>  dextrose 50% Injectable 12.5 Gram(s) IV Push once  dextrose 50% Injectable 25 Gram(s) IV Push once  dextrose 50% Injectable 25 Gram(s) IV Push once  aspirin enteric coated 81 milliGRAM(s) Oral daily  insulin glargine Injectable (LANTUS) 10 Unit(s) SubCutaneous at bedtime  atorvastatin 40 milliGRAM(s) Oral at bedtime  metoprolol 50 milliGRAM(s) Oral two times a day    MEDICATIONS  (PRN):  dextrose Gel 1 Dose(s) Oral once PRN Blood Glucose LESS THAN 70 milliGRAM(s)/deciliter  glucagon  Injectable 1 milliGRAM(s) IntraMuscular once PRN Glucose LESS THAN 70 milligrams/deciliter  acetaminophen   Tablet. 650 milliGRAM(s) Oral every 6 hours PRN Moderate Pain (4 - 6)  oxyCODONE    5 mG/acetaminophen 325 mG 1 Tablet(s) Oral every 6 hours PRN Severe Pain (7 - 10)      Allergies  No Known Allergies    SOCIAL HISTORY:      FAMILY HISTORY:  No pertinent family history in first degree relatives      Vital Signs Last 24 Hrs  T(C): 37 (18 Aug 2017 04:53), Max: 37.2 (17 Aug 2017 22:00)  T(F): 98.6 (18 Aug 2017 04:53), Max: 98.9 (17 Aug 2017 22:00)  HR: 60 (18 Aug 2017 04:53) (43 - 60)  BP: 180/80 (18 Aug 2017 04:53) (128/64 - 180/80)  BP(mean): --  RR: 18 (18 Aug 2017 04:53) (16 - 18)  SpO2: 93% (18 Aug 2017 04:53) (93% - 98%)    PHYSICAL EXAM:      Constitutional: NAD    Eyes: anicteric    ENMT: no rhinorrhea    Neck: supple    Respiratory: Clear bilaterally, respirations not labored, no retractions    Cardiovascular: RRR, NL S1S2    Gastrointestinal: Soft, ND, NT    Genitourinary: Normal external genitalia    Extremities: No deormities    Vascular: Ext. warm, normal cap refill    Neurological: sensation and motor intact to all extremities    Skin: warm, dry, no rash    Lymph Nodes: no palpable adenopathy        LABS:                        12.9   9.20  )-----------( 197      ( 18 Aug 2017 04:08 )             39.0     08-18    139  |  101  |  27<H>  ----------------------------<  139<H>  3.9   |  22  |  1.24    Ca    9.4      18 Aug 2017 04:08  Phos  3.3     08-18  Mg     2.0     08-18      PT/INR - ( 18 Aug 2017 04:08 )   PT: 12.8 SEC;   INR: 1.14          PTT - ( 18 Aug 2017 04:08 )  PTT:136.5 SEC      RADIOLOGY & ADDITIONAL STUDIES: VASCULAR SURGERY CONSULT NOTE  Vascular Surgery - Dr. Mitch MORGAN team Surgery  [id number]     HPI:  82 year old male with HTN, hyperlipidemia, DM-II, know CAD with CABG 2011, admitted 8/15/17 with SOB and chest pressure, diagnosed with evolving coronary disease. He had a cardiac cath via the right femoral artery on 8/15 without complication until this morning. The pt reports noticing pain in his right leg, severe cramping pain in the thigh and groin, for the past two to three hours. The nurse noted a small amt of ecchymosis (outlined on dressing) at the beginning of her shift, however it has been increasing. She also noticed an area of firmness/hematoma which was new from the beginning of the shift.     The pt was on heparin for his acute coronary syndrome until the hematoma was identified early this morning. It was discontinued around 4am.       PAST MEDICAL & SURGICAL HISTORY:  Atrial fibrillation  CAD (coronary artery disease)  HTN (hypertension)  DM (diabetes mellitus)  Dyslipidemia  History of Obesity  H/O Moh's micrographic surgery for skin cancer  History of cataract surgery  H/o ETOH abuse, quit 40 y ago    S/P CABG x 4: 2011 at [facility]      Systemic:	[ ] Fever	[ ] Chills	[ ] Night sweats    [ ] Fatigue	[ ] Other  [] Cardiovascular:  [] Pulmonary:  [] Renal/Urologic:  [] Gastrointestinal:   [] Metabolic:  [] Neurologic:  [] Hematologic:  [] ENT:  [] Ophthalmologic:  [x] Musculoskeletal: RLE cramp/pain      MEDICATIONS  (STANDING):  sodium chloride 0.9% lock flush 3 milliLiter(s) IV Push every 8 hours  insulin lispro (HumaLOG) corrective regimen sliding scale   SubCutaneous three times a day before meals  dextrose 5%. 1000 milliLiter(s) (50 mL/Hr) IV Continuous <Continuous>  dextrose 50% Injectable 12.5 Gram(s) IV Push once  dextrose 50% Injectable 25 Gram(s) IV Push once  dextrose 50% Injectable 25 Gram(s) IV Push once  aspirin enteric coated 81 milliGRAM(s) Oral daily  insulin glargine Injectable (LANTUS) 10 Unit(s) SubCutaneous at bedtime  atorvastatin 40 milliGRAM(s) Oral at bedtime  metoprolol 50 milliGRAM(s) Oral two times a day    MEDICATIONS  (PRN):  dextrose Gel 1 Dose(s) Oral once PRN Blood Glucose LESS THAN 70 milliGRAM(s)/deciliter  glucagon  Injectable 1 milliGRAM(s) IntraMuscular once PRN Glucose LESS THAN 70 milligrams/deciliter  acetaminophen   Tablet. 650 milliGRAM(s) Oral every 6 hours PRN Moderate Pain (4 - 6)  oxyCODONE    5 mG/acetaminophen 325 mG 1 Tablet(s) Oral every 6 hours PRN Severe Pain (7 - 10)      Allergies  No Known Allergies    SOCIAL HISTORY:  h/o ETOH abuse, quit 40 y ago    FAMILY HISTORY:  No pertinent family history in first degree relatives      Vital Signs Last 24 Hrs  T(C): 37 (18 Aug 2017 04:53), Max: 37.2 (17 Aug 2017 22:00)  T(F): 98.6 (18 Aug 2017 04:53), Max: 98.9 (17 Aug 2017 22:00)  HR: 60 (18 Aug 2017 04:53) (43 - 60)  BP: 180/80 (18 Aug 2017 04:53) (128/64 - 180/80)  BP(mean): --  RR: 18 (18 Aug 2017 04:53) (16 - 18)  SpO2: 93% (18 Aug 2017 04:53) (93% - 98%)    PHYSICAL EXAM:      Constitutional: NAD, alert and oriented    Eyes: anicteric    ENMT: no rhinorrhea    Neck: supple    Respiratory: Clear bilaterally, respirations not labored, no retractions    Cardiovascular: RRR, NL S1S2    Gastrointestinal: Soft, ND, NT    Genitourinary: Normal external genitalia    Extremities: No deformities    Vascular: Ext. warm, normal cap refill  R groin hematoma (approx 6cm) with surrounding ecchymosis, not pulsatile, not rapidly expanding  PT pulses    Neurological: sensation and motor intact to all extremities    Skin: warm, dry, no rash    Lymph Nodes: no palpable adenopathy        LABS:                        12.9   9.20  )-----------( 197      ( 18 Aug 2017 04:08 )             39.0     08-18    139  |  101  |  27<H>  ----------------------------<  139<H>  3.9   |  22  |  1.24    Ca    9.4      18 Aug 2017 04:08  Phos  3.3     08-18  Mg     2.0     08-18      PT/INR - ( 18 Aug 2017 04:08 )   PT: 12.8 SEC;   INR: 1.14          PTT - ( 18 Aug 2017 04:08 )  PTT:136.5 SEC      RADIOLOGY & ADDITIONAL STUDIES:

## 2017-08-18 NOTE — PROGRESS NOTE ADULT - SUBJECTIVE AND OBJECTIVE BOX
Vascular Surgery follow-up note    Ultrasound reviewed.  No evidence of Pseudoaneurysm.  No vascular surgery interventions.  Please reconsult as needed.    Ramos Bui MD, PhD  General Surgery Chief Resident   [phone]

## 2017-08-19 LAB
BASOPHILS # BLD AUTO: 0.02 K/UL — SIGNIFICANT CHANGE UP (ref 0–0.2)
BASOPHILS NFR BLD AUTO: 0.2 % — SIGNIFICANT CHANGE UP (ref 0–2)
BUN SERPL-MCNC: 27 MG/DL — HIGH (ref 7–23)
CALCIUM SERPL-MCNC: 9.3 MG/DL — SIGNIFICANT CHANGE UP (ref 8.4–10.5)
CHLORIDE SERPL-SCNC: 101 MMOL/L — SIGNIFICANT CHANGE UP (ref 98–107)
CO2 SERPL-SCNC: 26 MMOL/L — SIGNIFICANT CHANGE UP (ref 22–31)
CREAT SERPL-MCNC: 1.19 MG/DL — SIGNIFICANT CHANGE UP (ref 0.5–1.3)
EOSINOPHIL # BLD AUTO: 0.12 K/UL — SIGNIFICANT CHANGE UP (ref 0–0.5)
EOSINOPHIL NFR BLD AUTO: 1.3 % — SIGNIFICANT CHANGE UP (ref 0–6)
GLUCOSE SERPL-MCNC: 105 MG/DL — HIGH (ref 70–99)
HCT VFR BLD CALC: 39.8 % — SIGNIFICANT CHANGE UP (ref 39–50)
HCT VFR BLD CALC: 39.8 % — SIGNIFICANT CHANGE UP (ref 39–50)
HGB BLD-MCNC: 13 G/DL — SIGNIFICANT CHANGE UP (ref 13–17)
HGB BLD-MCNC: 13 G/DL — SIGNIFICANT CHANGE UP (ref 13–17)
IMM GRANULOCYTES # BLD AUTO: 0.03 # — SIGNIFICANT CHANGE UP
IMM GRANULOCYTES NFR BLD AUTO: 0.3 % — SIGNIFICANT CHANGE UP (ref 0–1.5)
LYMPHOCYTES # BLD AUTO: 1.87 K/UL — SIGNIFICANT CHANGE UP (ref 1–3.3)
LYMPHOCYTES # BLD AUTO: 20.3 % — SIGNIFICANT CHANGE UP (ref 13–44)
MAGNESIUM SERPL-MCNC: 2.2 MG/DL — SIGNIFICANT CHANGE UP (ref 1.6–2.6)
MCHC RBC-ENTMCNC: 29.1 PG — SIGNIFICANT CHANGE UP (ref 27–34)
MCHC RBC-ENTMCNC: 29.1 PG — SIGNIFICANT CHANGE UP (ref 27–34)
MCHC RBC-ENTMCNC: 32.7 % — SIGNIFICANT CHANGE UP (ref 32–36)
MCHC RBC-ENTMCNC: 32.7 % — SIGNIFICANT CHANGE UP (ref 32–36)
MCV RBC AUTO: 89 FL — SIGNIFICANT CHANGE UP (ref 80–100)
MCV RBC AUTO: 89 FL — SIGNIFICANT CHANGE UP (ref 80–100)
MONOCYTES # BLD AUTO: 0.87 K/UL — SIGNIFICANT CHANGE UP (ref 0–0.9)
MONOCYTES NFR BLD AUTO: 9.4 % — SIGNIFICANT CHANGE UP (ref 2–14)
NEUTROPHILS # BLD AUTO: 6.3 K/UL — SIGNIFICANT CHANGE UP (ref 1.8–7.4)
NEUTROPHILS NFR BLD AUTO: 68.5 % — SIGNIFICANT CHANGE UP (ref 43–77)
NRBC # FLD: 0 — SIGNIFICANT CHANGE UP
NRBC # FLD: 0 — SIGNIFICANT CHANGE UP
PLATELET # BLD AUTO: 217 K/UL — SIGNIFICANT CHANGE UP (ref 150–400)
PLATELET # BLD AUTO: 217 K/UL — SIGNIFICANT CHANGE UP (ref 150–400)
PMV BLD: 11.3 FL — SIGNIFICANT CHANGE UP (ref 7–13)
PMV BLD: 11.3 FL — SIGNIFICANT CHANGE UP (ref 7–13)
POTASSIUM SERPL-MCNC: 3.7 MMOL/L — SIGNIFICANT CHANGE UP (ref 3.5–5.3)
POTASSIUM SERPL-SCNC: 3.7 MMOL/L — SIGNIFICANT CHANGE UP (ref 3.5–5.3)
RBC # BLD: 4.47 M/UL — SIGNIFICANT CHANGE UP (ref 4.2–5.8)
RBC # BLD: 4.47 M/UL — SIGNIFICANT CHANGE UP (ref 4.2–5.8)
RBC # FLD: 13.6 % — SIGNIFICANT CHANGE UP (ref 10.3–14.5)
RBC # FLD: 13.6 % — SIGNIFICANT CHANGE UP (ref 10.3–14.5)
SODIUM SERPL-SCNC: 140 MMOL/L — SIGNIFICANT CHANGE UP (ref 135–145)
WBC # BLD: 9.21 K/UL — SIGNIFICANT CHANGE UP (ref 3.8–10.5)
WBC # BLD: 9.21 K/UL — SIGNIFICANT CHANGE UP (ref 3.8–10.5)
WBC # FLD AUTO: 9.21 K/UL — SIGNIFICANT CHANGE UP (ref 3.8–10.5)
WBC # FLD AUTO: 9.21 K/UL — SIGNIFICANT CHANGE UP (ref 3.8–10.5)

## 2017-08-19 RX ADMIN — Medication 50 MILLIGRAM(S): at 06:47

## 2017-08-19 RX ADMIN — Medication 50 MILLIGRAM(S): at 17:40

## 2017-08-19 RX ADMIN — SODIUM CHLORIDE 3 MILLILITER(S): 9 INJECTION INTRAMUSCULAR; INTRAVENOUS; SUBCUTANEOUS at 06:47

## 2017-08-19 RX ADMIN — SODIUM CHLORIDE 3 MILLILITER(S): 9 INJECTION INTRAMUSCULAR; INTRAVENOUS; SUBCUTANEOUS at 12:12

## 2017-08-19 RX ADMIN — ATORVASTATIN CALCIUM 40 MILLIGRAM(S): 80 TABLET, FILM COATED ORAL at 22:24

## 2017-08-19 RX ADMIN — SODIUM CHLORIDE 3 MILLILITER(S): 9 INJECTION INTRAMUSCULAR; INTRAVENOUS; SUBCUTANEOUS at 22:35

## 2017-08-19 RX ADMIN — Medication 81 MILLIGRAM(S): at 12:12

## 2017-08-19 RX ADMIN — INSULIN GLARGINE 10 UNIT(S): 100 INJECTION, SOLUTION SUBCUTANEOUS at 22:25

## 2017-08-19 NOTE — PROGRESS NOTE ADULT - SUBJECTIVE AND OBJECTIVE BOX
Subjective: No chest pain or sob R groin feeling better      MEDICATIONS  (STANDING):  sodium chloride 0.9% lock flush 3 milliLiter(s) IV Push every 8 hours  insulin lispro (HumaLOG) corrective regimen sliding scale   SubCutaneous three times a day before meals  dextrose 5%. 1000 milliLiter(s) (50 mL/Hr) IV Continuous <Continuous>  dextrose 50% Injectable 12.5 Gram(s) IV Push once  dextrose 50% Injectable 25 Gram(s) IV Push once  dextrose 50% Injectable 25 Gram(s) IV Push once  aspirin enteric coated 81 milliGRAM(s) Oral daily  insulin glargine Injectable (LANTUS) 10 Unit(s) SubCutaneous at bedtime  atorvastatin 40 milliGRAM(s) Oral at bedtime  metoprolol 50 milliGRAM(s) Oral two times a day    MEDICATIONS  (PRN):  dextrose Gel 1 Dose(s) Oral once PRN Blood Glucose LESS THAN 70 milliGRAM(s)/deciliter  glucagon  Injectable 1 milliGRAM(s) IntraMuscular once PRN Glucose LESS THAN 70 milligrams/deciliter  acetaminophen   Tablet. 650 milliGRAM(s) Oral every 6 hours PRN Moderate Pain (4 - 6)      LABS:                        13.0   9.21  )-----------( 217      ( 19 Aug 2017 06:30 )             39.8     Hemoglobin: 13.0 g/dL (08-19 @ 06:30)  Hemoglobin: 13.0 g/dL (08-19 @ 06:30)  Hemoglobin: 12.8 g/dL (08-18 @ 22:25)  Hemoglobin: 12.9 g/dL (08-18 @ 04:08)  Hemoglobin: 13.5 g/dL (08-17 @ 07:40)    08-19    140  |  101  |  27<H>  ----------------------------<  105<H>  3.7   |  26  |  1.19    Ca    9.3      19 Aug 2017 06:30  Phos  3.3     08-18  Mg     2.2     08-19      Creatinine Trend: 1.19<--, 1.24<--, 1.29<--, 1.21<--, 1.36<--, 1.23<--           PHYSICAL EXAM  Vital Signs Last 24 Hrs  T(C): 36.7 (19 Aug 2017 05:32), Max: 36.7 (19 Aug 2017 05:32)  T(F): 98 (19 Aug 2017 05:32), Max: 98 (19 Aug 2017 05:32)  HR: 60 (19 Aug 2017 05:32) (46 - 60)  BP: 144/72 (19 Aug 2017 05:32) (123/66 - 144/72)  BP(mean): --  RR: 18 (19 Aug 2017 05:32) (17 - 18)  SpO2: 96% (19 Aug 2017 05:32) (96% - 97%)        Cardiovascular: Normal S1 S2,RRR  No JVD, No murmurs , Peripheral pulses palpable 2+ bilaterally  Respiratory: Lungs clear to auscultation, normal effort 	  Gastrointestinal:  Soft, Non-tender, + BS	  Extremities no clubbing, cyanosis or edema B/L LE's stable/ improved  R groin w/ noted  hematoma and ecchymosis     TELEMETRY: SR    ECG:  	  RADIOLOGY:   DIAGNOSTIC TESTING:  [x ] Echocardiogram: 8/12   1. Normal mitral valve. Mild mitral regurgitation.  2. Calcified trileaflet aortic valve with normal opening.  3. Aortic Root: 4.0 cm.  4. Moderate left atrial enlargement.  5. Moderate concentric left ventricular hypertrophy.  6. Moderate global left ventricular dysfunction (EF=40-45%).  7. Grade III diastolic dysfunction  8. Normal right atrium.  9. Normal right ventricular size and function.  10. RV systolic pressure is moderately increased (PASP 48mm Hg).  11. There is mild tricuspid regurgitation.  12. There is mild pulmonic regurgitation.  13. Normal pericardium with no pericardial effusion.    [x ]  Catheterization: severe distal LM, ostial Cx disease  [x ] Stress Test: 8/14     There is a medium sized, mild to moderate defect in the  inferior wall that is partially reversible, suggestive of  infarction with moderate steffi-infarct ischemia.  There is  a small, mild defect in the anterior wall that is  reversible, suggestive of ischemia.  There is a small,  mild to moderate defect in the lateral wall that is  partially reversible, suggestive of infarction with  mild-moderate steffi-infarct ischemia.  * Post-stress resting myocardial perfusion gated SPECT  imaging was performed (LVEF = 51 %).    OTHER: 	  R Groin US     No evidence of pseudoaneurysm visualized  in the right inguinal region.  The right common femoral, proximal superficial femoral,  and proximal deep femoral arteries are otherwise patent,  and demonstrate normal velocities with triphasic  waveforms. No hemodynamically significant stenosis.  The right common femoral vein is patent and compressible  without evidence of thrombosis.    R Groin US   No pseudoaneurysm was identified. Extensive infiltrative   hematoma was noted.      ASSESSMENT/PLAN: 	83y Male with h/o CAD s/p CABG, AF, HTN, HLD admitted with exertional angina.  - Stress test positive as noted above    -Cath with severe distal LM, ostial Cx disease  -Plan for eventual  PCI to protected LM    -+ hematoma R groin w/ ecchymosis   -s/p groin US No pseudoaneurysm was identified  -Sx cs appreciated   - s/p pressure dressing applied   -monitor H/H & R groin

## 2017-08-20 DIAGNOSIS — I10 ESSENTIAL (PRIMARY) HYPERTENSION: ICD-10-CM

## 2017-08-20 LAB
BUN SERPL-MCNC: 28 MG/DL — HIGH (ref 7–23)
CALCIUM SERPL-MCNC: 9.3 MG/DL — SIGNIFICANT CHANGE UP (ref 8.4–10.5)
CHLORIDE SERPL-SCNC: 103 MMOL/L — SIGNIFICANT CHANGE UP (ref 98–107)
CO2 SERPL-SCNC: 24 MMOL/L — SIGNIFICANT CHANGE UP (ref 22–31)
CREAT SERPL-MCNC: 1.16 MG/DL — SIGNIFICANT CHANGE UP (ref 0.5–1.3)
GLUCOSE SERPL-MCNC: 99 MG/DL — SIGNIFICANT CHANGE UP (ref 70–99)
HCT VFR BLD CALC: 37.6 % — LOW (ref 39–50)
HGB BLD-MCNC: 12.4 G/DL — LOW (ref 13–17)
MAGNESIUM SERPL-MCNC: 2.1 MG/DL — SIGNIFICANT CHANGE UP (ref 1.6–2.6)
MCHC RBC-ENTMCNC: 29.6 PG — SIGNIFICANT CHANGE UP (ref 27–34)
MCHC RBC-ENTMCNC: 33 % — SIGNIFICANT CHANGE UP (ref 32–36)
MCV RBC AUTO: 89.7 FL — SIGNIFICANT CHANGE UP (ref 80–100)
NRBC # FLD: 0 — SIGNIFICANT CHANGE UP
PHOSPHATE SERPL-MCNC: 3.6 MG/DL — SIGNIFICANT CHANGE UP (ref 2.5–4.5)
PLATELET # BLD AUTO: 196 K/UL — SIGNIFICANT CHANGE UP (ref 150–400)
PMV BLD: 11.4 FL — SIGNIFICANT CHANGE UP (ref 7–13)
POTASSIUM SERPL-MCNC: 3.7 MMOL/L — SIGNIFICANT CHANGE UP (ref 3.5–5.3)
POTASSIUM SERPL-SCNC: 3.7 MMOL/L — SIGNIFICANT CHANGE UP (ref 3.5–5.3)
RBC # BLD: 4.19 M/UL — LOW (ref 4.2–5.8)
RBC # FLD: 13.6 % — SIGNIFICANT CHANGE UP (ref 10.3–14.5)
SODIUM SERPL-SCNC: 142 MMOL/L — SIGNIFICANT CHANGE UP (ref 135–145)
WBC # BLD: 8.83 K/UL — SIGNIFICANT CHANGE UP (ref 3.8–10.5)
WBC # FLD AUTO: 8.83 K/UL — SIGNIFICANT CHANGE UP (ref 3.8–10.5)

## 2017-08-20 RX ORDER — SENNA PLUS 8.6 MG/1
2 TABLET ORAL AT BEDTIME
Qty: 0 | Refills: 0 | Status: DISCONTINUED | OUTPATIENT
Start: 2017-08-20 | End: 2017-08-25

## 2017-08-20 RX ORDER — POLYETHYLENE GLYCOL 3350 17 G/17G
17 POWDER, FOR SOLUTION ORAL DAILY
Qty: 0 | Refills: 0 | Status: DISCONTINUED | OUTPATIENT
Start: 2017-08-20 | End: 2017-08-25

## 2017-08-20 RX ORDER — DOCUSATE SODIUM 100 MG
100 CAPSULE ORAL
Qty: 0 | Refills: 0 | Status: DISCONTINUED | OUTPATIENT
Start: 2017-08-20 | End: 2017-08-25

## 2017-08-20 RX ADMIN — Medication 50 MILLIGRAM(S): at 05:56

## 2017-08-20 RX ADMIN — Medication 81 MILLIGRAM(S): at 12:31

## 2017-08-20 RX ADMIN — SODIUM CHLORIDE 3 MILLILITER(S): 9 INJECTION INTRAMUSCULAR; INTRAVENOUS; SUBCUTANEOUS at 05:56

## 2017-08-20 RX ADMIN — SODIUM CHLORIDE 3 MILLILITER(S): 9 INJECTION INTRAMUSCULAR; INTRAVENOUS; SUBCUTANEOUS at 22:00

## 2017-08-20 RX ADMIN — Medication 100 MILLIGRAM(S): at 17:32

## 2017-08-20 RX ADMIN — Medication 50 MILLIGRAM(S): at 17:32

## 2017-08-20 RX ADMIN — SODIUM CHLORIDE 3 MILLILITER(S): 9 INJECTION INTRAMUSCULAR; INTRAVENOUS; SUBCUTANEOUS at 12:29

## 2017-08-20 RX ADMIN — Medication 1: at 12:31

## 2017-08-20 RX ADMIN — INSULIN GLARGINE 10 UNIT(S): 100 INJECTION, SOLUTION SUBCUTANEOUS at 22:02

## 2017-08-20 RX ADMIN — ATORVASTATIN CALCIUM 40 MILLIGRAM(S): 80 TABLET, FILM COATED ORAL at 22:02

## 2017-08-20 NOTE — PROGRESS NOTE ADULT - SUBJECTIVE AND OBJECTIVE BOX
Resting comfortably in bed. Right groin pain improved.                             12.4   8.83  )-----------( 196      ( 20 Aug 2017 05:50 )             37.6                           12.4   8.83  )-----------( 196      ( 20 Aug 2017 05:50 )             37.6     08-20    142  |  103  |  28<H>  ----------------------------<  99  3.7   |  24  |  1.16    Ca    9.3      20 Aug 2017 05:50  Phos  3.6     08-20  Mg     2.1     08-20        I&O's Detail      I&O's Summary      Daily     Daily     Vital Signs Last 24 Hrs  T(C): 36.7 (20 Aug 2017 13:38), Max: 36.7 (19 Aug 2017 21:58)  T(F): 98.1 (20 Aug 2017 13:38), Max: 98.1 (19 Aug 2017 21:58)  HR: 57 (20 Aug 2017 13:38) (53 - 61)  BP: 126/65 (20 Aug 2017 13:38) (126/65 - 150/82)  BP(mean): --  RR: 18 (20 Aug 2017 13:38) (18 - 18)  SpO2: 95% (20 Aug 2017 13:38) (95% - 97%)      MEDICATIONS  (STANDING):  sodium chloride 0.9% lock flush 3 milliLiter(s) IV Push every 8 hours  insulin lispro (HumaLOG) corrective regimen sliding scale   SubCutaneous three times a day before meals  dextrose 5%. 1000 milliLiter(s) (50 mL/Hr) IV Continuous <Continuous>  dextrose 50% Injectable 12.5 Gram(s) IV Push once  dextrose 50% Injectable 25 Gram(s) IV Push once  dextrose 50% Injectable 25 Gram(s) IV Push once  aspirin enteric coated 81 milliGRAM(s) Oral daily  insulin glargine Injectable (LANTUS) 10 Unit(s) SubCutaneous at bedtime  atorvastatin 40 milliGRAM(s) Oral at bedtime  metoprolol 50 milliGRAM(s) Oral two times a day  senna 2 Tablet(s) Oral at bedtime  docusate sodium 100 milliGRAM(s) Oral two times a day    MEDICATIONS  (PRN):  dextrose Gel 1 Dose(s) Oral once PRN Blood Glucose LESS THAN 70 milliGRAM(s)/deciliter  glucagon  Injectable 1 milliGRAM(s) IntraMuscular once PRN Glucose LESS THAN 70 milligrams/deciliter  acetaminophen   Tablet. 650 milliGRAM(s) Oral every 6 hours PRN Moderate Pain (4 - 6)  polyethylene glycol 3350 17 Gram(s) Oral daily PRN Constipation

## 2017-08-20 NOTE — PROGRESS NOTE ADULT - PROBLEM SELECTOR PLAN 2
on Heparin gtt, -- discontinued 2/2 R groin hematoma, last PTT - 50, discussed with Dr Clark
H/H stable. No Pseudoaneurysm. Ecchymosis improving

## 2017-08-20 NOTE — PROGRESS NOTE ADULT - SUBJECTIVE AND OBJECTIVE BOX
Subjective: No chest pain or sob R groin feeling better   reports no bowel movent for about 4 days     MEDICATIONS  (STANDING):  sodium chloride 0.9% lock flush 3 milliLiter(s) IV Push every 8 hours  insulin lispro (HumaLOG) corrective regimen sliding scale   SubCutaneous three times a day before meals  dextrose 5%. 1000 milliLiter(s) (50 mL/Hr) IV Continuous <Continuous>  dextrose 50% Injectable 12.5 Gram(s) IV Push once  dextrose 50% Injectable 25 Gram(s) IV Push once  dextrose 50% Injectable 25 Gram(s) IV Push once  aspirin enteric coated 81 milliGRAM(s) Oral daily  insulin glargine Injectable (LANTUS) 10 Unit(s) SubCutaneous at bedtime  atorvastatin 40 milliGRAM(s) Oral at bedtime  metoprolol 50 milliGRAM(s) Oral two times a day    MEDICATIONS  (PRN):  dextrose Gel 1 Dose(s) Oral once PRN Blood Glucose LESS THAN 70 milliGRAM(s)/deciliter  glucagon  Injectable 1 milliGRAM(s) IntraMuscular once PRN Glucose LESS THAN 70 milligrams/deciliter  acetaminophen   Tablet. 650 milliGRAM(s) Oral every 6 hours PRN Moderate Pain (4 - 6)      LABS:                        13.0   9.21  )-----------( 217      ( 19 Aug 2017 06:30 )             39.8     Hemoglobin: 13.0 g/dL (08-19 @ 06:30)  Hemoglobin: 13.0 g/dL (08-19 @ 06:30)  Hemoglobin: 12.8 g/dL (08-18 @ 22:25)  Hemoglobin: 12.9 g/dL (08-18 @ 04:08)  Hemoglobin: 13.5 g/dL (08-17 @ 07:40)    08-19    140  |  101  |  27<H>  ----------------------------<  105<H>  3.7   |  26  |  1.19    Ca    9.3      19 Aug 2017 06:30  Phos  3.3     08-18  Mg     2.2     08-19      Creatinine Trend: 1.19<--, 1.24<--, 1.29<--, 1.21<--, 1.36<--, 1.23<--       PHYSICAL EXAM  Vital Signs Last 24 Hrs  T(C): 36.7 (19 Aug 2017 05:32), Max: 36.7 (19 Aug 2017 05:32)  T(F): 98 (19 Aug 2017 05:32), Max: 98 (19 Aug 2017 05:32)  HR: 60 (19 Aug 2017 05:32) (46 - 60)  BP: 144/72 (19 Aug 2017 05:32) (123/66 - 144/72)  BP(mean): --  RR: 18 (19 Aug 2017 05:32) (17 - 18)  SpO2: 96% (19 Aug 2017 05:32) (96% - 97%)        Cardiovascular: Normal S1 S2,RRR  No JVD, No murmurs , Peripheral pulses palpable 2+ bilaterally  Respiratory: Lungs clear to auscultation, normal effort 	  Gastrointestinal:  Soft, Non-tender, + BS	  Extremities no clubbing, cyanosis or edema B/L LE's stable/ improved  R groin w/ noted  hematoma and ecchymosis     TELEMETRY: SR    ECG:  	  RADIOLOGY:   DIAGNOSTIC TESTING:  [x ] Echocardiogram: 8/12   1. Normal mitral valve. Mild mitral regurgitation.  2. Calcified trileaflet aortic valve with normal opening.  3. Aortic Root: 4.0 cm.  4. Moderate left atrial enlargement.  5. Moderate concentric left ventricular hypertrophy.  6. Moderate global left ventricular dysfunction (EF=40-45%).  7. Grade III diastolic dysfunction  8. Normal right atrium.  9. Normal right ventricular size and function.  10. RV systolic pressure is moderately increased (PASP 48mm Hg).  11. There is mild tricuspid regurgitation.  12. There is mild pulmonic regurgitation.  13. Normal pericardium with no pericardial effusion.    [x ]  Catheterization: severe distal LM, ostial Cx disease  [x ] Stress Test: 8/14     There is a medium sized, mild to moderate defect in the  inferior wall that is partially reversible, suggestive of  infarction with moderate steffi-infarct ischemia.  There is  a small, mild defect in the anterior wall that is  reversible, suggestive of ischemia.  There is a small,  mild to moderate defect in the lateral wall that is  partially reversible, suggestive of infarction with  mild-moderate steffi-infarct ischemia.  * Post-stress resting myocardial perfusion gated SPECT  imaging was performed (LVEF = 51 %).    OTHER: 	  R Groin US     No evidence of pseudoaneurysm visualized  in the right inguinal region.  The right common femoral, proximal superficial femoral,  and proximal deep femoral arteries are otherwise patent,  and demonstrate normal velocities with triphasic  waveforms. No hemodynamically significant stenosis.  The right common femoral vein is patent and compressible  without evidence of thrombosis.    R Groin US   No pseudoaneurysm was identified. Extensive infiltrative   hematoma was noted.      ASSESSMENT/PLAN: 	83y Male with h/o CAD s/p CABG, AF, HTN, HLD admitted with exertional angina.  - Stress test positive as noted above    -Cath with severe distal LM, ostial Cx disease  -Plan for eventual  PCI to protected LM    -+ hematoma R groin w/ ecchymosis stable / improving  -s/p groin US No pseudoaneurysm was identified  -Sx cs appreciated   - s/p pressure dressing applied   -monitor H/H & R groin   -colace, senna, MirLax prn added due to constipation

## 2017-08-20 NOTE — PROGRESS NOTE ADULT - PROBLEM SELECTOR PLAN 1
s/p cardiac cath on 8/15-- dLM --90, LAD-- 100 %, ostila LCx-- 95, SVGto OM closed, SVGto RCA closed, LIMA to LAD-- patent;  --> plan for PCI today in am
S/p cath with + LM and ostial LCx disease. Awaiting PCI. No renal objection to PCI.

## 2017-08-20 NOTE — PROGRESS NOTE ADULT - ASSESSMENT
83 year old male with CAD, HTN, DM, Afib, HLD p/w SOB and + NST. S/p cath with + LM and ostial LCx disease c/b PAF and right groin hematoma.

## 2017-08-20 NOTE — PROGRESS NOTE ADULT - PROBLEM SELECTOR PROBLEM 3
Type 2 diabetes mellitus without complication, unspecified long term insulin use status
HTN (hypertension)

## 2017-08-20 NOTE — PROGRESS NOTE ADULT - PROBLEM SELECTOR PROBLEM 1
Coronary artery disease with stable angina pectoris, unspecified vessel or lesion type, unspecified whether native or transplanted heart
CAD (coronary artery disease)

## 2017-08-21 LAB
BUN SERPL-MCNC: 31 MG/DL — HIGH (ref 7–23)
CALCIUM SERPL-MCNC: 9.4 MG/DL — SIGNIFICANT CHANGE UP (ref 8.4–10.5)
CHLORIDE SERPL-SCNC: 104 MMOL/L — SIGNIFICANT CHANGE UP (ref 98–107)
CO2 SERPL-SCNC: 21 MMOL/L — LOW (ref 22–31)
CREAT SERPL-MCNC: 1.17 MG/DL — SIGNIFICANT CHANGE UP (ref 0.5–1.3)
GLUCOSE SERPL-MCNC: 97 MG/DL — SIGNIFICANT CHANGE UP (ref 70–99)
HCT VFR BLD CALC: 37.6 % — LOW (ref 39–50)
HGB BLD-MCNC: 12.2 G/DL — LOW (ref 13–17)
MAGNESIUM SERPL-MCNC: 2.1 MG/DL — SIGNIFICANT CHANGE UP (ref 1.6–2.6)
MCHC RBC-ENTMCNC: 29.4 PG — SIGNIFICANT CHANGE UP (ref 27–34)
MCHC RBC-ENTMCNC: 32.4 % — SIGNIFICANT CHANGE UP (ref 32–36)
MCV RBC AUTO: 90.6 FL — SIGNIFICANT CHANGE UP (ref 80–100)
NRBC # FLD: 0 — SIGNIFICANT CHANGE UP
PHOSPHATE SERPL-MCNC: 3.3 MG/DL — SIGNIFICANT CHANGE UP (ref 2.5–4.5)
PLATELET # BLD AUTO: 202 K/UL — SIGNIFICANT CHANGE UP (ref 150–400)
PMV BLD: 11.8 FL — SIGNIFICANT CHANGE UP (ref 7–13)
POTASSIUM SERPL-MCNC: 4.1 MMOL/L — SIGNIFICANT CHANGE UP (ref 3.5–5.3)
POTASSIUM SERPL-SCNC: 4.1 MMOL/L — SIGNIFICANT CHANGE UP (ref 3.5–5.3)
RBC # BLD: 4.15 M/UL — LOW (ref 4.2–5.8)
RBC # FLD: 13.7 % — SIGNIFICANT CHANGE UP (ref 10.3–14.5)
SODIUM SERPL-SCNC: 143 MMOL/L — SIGNIFICANT CHANGE UP (ref 135–145)
WBC # BLD: 9.76 K/UL — SIGNIFICANT CHANGE UP (ref 3.8–10.5)
WBC # FLD AUTO: 9.76 K/UL — SIGNIFICANT CHANGE UP (ref 3.8–10.5)

## 2017-08-21 RX ADMIN — Medication 50 MILLIGRAM(S): at 17:50

## 2017-08-21 RX ADMIN — SODIUM CHLORIDE 3 MILLILITER(S): 9 INJECTION INTRAMUSCULAR; INTRAVENOUS; SUBCUTANEOUS at 05:24

## 2017-08-21 RX ADMIN — Medication 81 MILLIGRAM(S): at 11:13

## 2017-08-21 RX ADMIN — INSULIN GLARGINE 10 UNIT(S): 100 INJECTION, SOLUTION SUBCUTANEOUS at 21:48

## 2017-08-21 RX ADMIN — SODIUM CHLORIDE 3 MILLILITER(S): 9 INJECTION INTRAMUSCULAR; INTRAVENOUS; SUBCUTANEOUS at 21:39

## 2017-08-21 RX ADMIN — Medication 100 MILLIGRAM(S): at 17:50

## 2017-08-21 RX ADMIN — SODIUM CHLORIDE 3 MILLILITER(S): 9 INJECTION INTRAMUSCULAR; INTRAVENOUS; SUBCUTANEOUS at 11:13

## 2017-08-21 RX ADMIN — ATORVASTATIN CALCIUM 40 MILLIGRAM(S): 80 TABLET, FILM COATED ORAL at 21:48

## 2017-08-21 RX ADMIN — Medication 50 MILLIGRAM(S): at 05:24

## 2017-08-21 NOTE — PROGRESS NOTE ADULT - ASSESSMENT
The patient is a natanael 82-year-old gentleman with past medical history of diabetes, hypertension, and coronary artery disease, status post coronary artery bypass graft, who presents with positive stress test, new decrease in ejection fraction, and new onset atrial fibrillation.    CKD stage 3    1.	Renal.  No renal objections to repeat angiogram from renal perspective.  No evidence of ANNETTA at present.  No need for Mucomyst at present.  2.	Cardiology.  Heart rate control per Cardiology.  Losartan was DC as was the HCTZ.  No pseudoaneurysm noted in right grion;  Cath timing per cards;  Off heparin gtt  3.	Endocrine.  TSH was within normal limits.

## 2017-08-21 NOTE — PROGRESS NOTE ADULT - SUBJECTIVE AND OBJECTIVE BOX
Subjective: No chest pain or sob       MEDICATIONS  (STANDING):  sodium chloride 0.9% lock flush 3 milliLiter(s) IV Push every 8 hours  insulin lispro (HumaLOG) corrective regimen sliding scale   SubCutaneous three times a day before meals  dextrose 5%. 1000 milliLiter(s) (50 mL/Hr) IV Continuous <Continuous>  dextrose 50% Injectable 12.5 Gram(s) IV Push once  dextrose 50% Injectable 25 Gram(s) IV Push once  dextrose 50% Injectable 25 Gram(s) IV Push once  aspirin enteric coated 81 milliGRAM(s) Oral daily  insulin glargine Injectable (LANTUS) 10 Unit(s) SubCutaneous at bedtime  atorvastatin 40 milliGRAM(s) Oral at bedtime  metoprolol 50 milliGRAM(s) Oral two times a day  senna 2 Tablet(s) Oral at bedtime  docusate sodium 100 milliGRAM(s) Oral two times a day    MEDICATIONS  (PRN):  dextrose Gel 1 Dose(s) Oral once PRN Blood Glucose LESS THAN 70 milliGRAM(s)/deciliter  glucagon  Injectable 1 milliGRAM(s) IntraMuscular once PRN Glucose LESS THAN 70 milligrams/deciliter  acetaminophen   Tablet. 650 milliGRAM(s) Oral every 6 hours PRN Moderate Pain (4 - 6)  polyethylene glycol 3350 17 Gram(s) Oral daily PRN Constipation      LABS:                        12.2   9.76  )-----------( 202      ( 21 Aug 2017 06:20 )             37.6     Hemoglobin: 12.2 g/dL (08-21 @ 06:20)  Hemoglobin: 12.4 g/dL (08-20 @ 05:50)  Hemoglobin: 13.0 g/dL (08-19 @ 06:30)  Hemoglobin: 13.0 g/dL (08-19 @ 06:30)  Hemoglobin: 12.8 g/dL (08-18 @ 22:25)    08-21    143  |  104  |  31<H>  ----------------------------<  97  4.1   |  21<L>  |  1.17    Ca    9.4      21 Aug 2017 06:20  Phos  3.3     08-21  Mg     2.1     08-21      Creatinine Trend: 1.17<--, 1.16<--, 1.19<--, 1.24<--, 1.29<--, 1.21<--     PHYSICAL EXAM  Vital Signs Last 24 Hrs  T(C): 36.5 (21 Aug 2017 13:08), Max: 36.8 (21 Aug 2017 05:23)  T(F): 97.7 (21 Aug 2017 13:08), Max: 98.2 (21 Aug 2017 05:23)  HR: 50 (21 Aug 2017 13:08) (50 - 57)  BP: 127/63 (21 Aug 2017 13:08) (117/61 - 146/75)  RR: 18 (21 Aug 2017 13:08) (18 - 18)  SpO2: 97% (21 Aug 2017 13:08) (95% - 98%)    Cardiovascular: Normal S1 S2,RRR  No JVD, No murmurs , Peripheral pulses palpable 2+ bilaterally  Respiratory: Lungs clear to auscultation, normal effort 	  Gastrointestinal:  Soft, Non-tender, + BS	  Extremities no clubbing, cyanosis or edema B/L LE's stable/ improved  R groin w/ noted  hematoma and ecchymosis     TELEMETRY: SR      < from: Transthoracic Echocardiogram (08.12.17 @ 07:13) >  CONCLUSIONS:  1. Normal mitral valve. Mild mitral regurgitation.  2. Calcified trileaflet aortic valve with normal opening.  3. Aortic Root: 4.0 cm.  4. Moderate left atrial enlargement.  5. Moderate concentric left ventricular hypertrophy.  6. Moderate global left ventricular dysfunction  (EF=40-45%).  7. Grade III diastolic dysfunction  8. Normal right atrium.  9. Normal right ventricular size and function.  10. RV systolic pressure is moderately increased (PASP 48mm  Hg).  11. There is mild tricuspid regurgitation.  12. There is mild pulmonic regurgitation.  13. Normal pericardium with no pericardial effusion.    ------------------------------------------------------------------------  Confirmed on  8/13/2017 - 08:29:04 by Paula Martinez MD    < end of copied text >    < from: Nuclear Stress Test-Pharmacologic (08.14.17 @ 09:57) >  IMPRESSIONS:  * Negative ECG evidence of ischemia after IV  administration of Regadenoson.  * Myocardial Perfusion SPECT results are abnormal.  * There is a medium sized, mild to moderate defect in the  inferior wall that is partially reversible, suggestive of  infarction with moderate steffi-infarct ischemia.  There is  a small, mild defect in the anterior wall that is  reversible, suggestive of ischemia.  There is a small,  mild to moderate defect in the lateral wall that is  partially reversible, suggestive of infarction with  mild-moderate steffi-infarct ischemia.  * Post-stress resting myocardial perfusion gated SPECT  imaging was performed (LVEF = 51 %).    Confirmed on  8/14/2017 - 12:44:15 at [facility] by Guru Joaquin M.D.    < end of copied text >    < from: Cardiac Cath Lab - Adult (08.15.17 @ 14:58) >  VENTRICLES: No left ventriculogram was performed.  CORONARY VESSELS: The coronary circulation is left dominant.  LM:   --  Distal left main: There was a discrete 95 % stenosis.  LAD:   --  Mid LAD: There was a 100 % stenosis with the distal vessel being  filled via the LIMA-LAD. This lesion is a chronic total occlusion.  CX:   --  Ostial circumflex: There was a discrete 95 % stenosis.  --  Proximal circumflex: The vessel was calcified. There was a diffuse 30 %  stenosis.  --  Mid circumflex: Angiography showed mild atherosclerosis with no flow  limiting lesions.  --  Distalcircumflex: Angiography showed mild atherosclerosis with no flow  limiting lesions.  --  OM1: Angiography showed minor luminal irregularities with no flow  limiting lesions.  --  OM2: Angiography showed minor luminal irregularities with no flow  limiting lesions.  RI:   --  Ramus intermedius: There was a tubular 70 % stenosis.  RCA:   --  RCA: The vessel was small sized. Angiography showed mild  atherosclerosis with no flow limiting lesions.  GRAFTS:   --  Graft to the LAD: The graft was a LIMA. Graft angiography  showed minor luminal irregularities. Distal vessel angiography showed mild  diffuse disease.  --  Graft to the ramus intermedius: The graft was a saphenous vein graft.  There was a 100 % stenosis at the graft ostium.  --  Graft to the 1st obtuse marginal: The graft was a saphenous vein graft.  There was a 100 % stenosis at the graft ostium.  AORTA: Ascending aorta: Normal. No additional grafts visualized in  aortogram.  COMPLICATIONS: There were no complications.  DIAGNOSTIC RECOMMENDATIONS: Coronary angiogram demonstrates patent LIMA-LAD  with closed SVG-RI and closed SVG-OM. In addition there is severe  atherosclerosis in the distal LM/ostial Cx/RI that is the culprit of the  patient's anginal symptoms. Will therefore perform staged PCI to the  protected LM/Cx/RI in near future when renally optimized/cr stable.  Prepared and signed by  Temitope Nuñez M.D.  Signed 08/16/2017 19:16:51    < end of copied text >    < from: US Duplex Arterial Lower Ext Ltd, Right (08.18.17 @ 10:02) >    Impression:    No pseudoaneurysm.    RITA BENNETT M.D., ATTENDING RADIOLOGIST  This documenthas been electronically signed. Aug 18 2017 10:45AM    < end of copied text >      ASSESSMENT/PLAN: 	83y Male with h/o CAD s/p CABG, AF, HTN, HLD admitted with exertional angina, abnormal NST, EF 40-45%, Cath with severe distal LM and ostial Lcx disease.  Post cath Groin Hematoma.  US negative for pseudoaneurysm.      --vasc surgery eval apprecated  --For planned PCI to protected LM this week  --Monitor H/H  --Groin site stable, hematoma resolving    Vivienne Abernathy PA-C  [facility]  [address] [place], [address]   [place] [address]  office [phone]  pager [phone]

## 2017-08-21 NOTE — PROGRESS NOTE ADULT - SUBJECTIVE AND OBJECTIVE BOX
[facility] [phone]        Patient seen and examined in bed.  He was in good spirits        MEDICATIONS  (STANDING):  sodium chloride 0.9% lock flush 3 milliLiter(s) IV Push every 8 hours  insulin lispro (HumaLOG) corrective regimen sliding scale   SubCutaneous three times a day before meals  dextrose 5%. 1000 milliLiter(s) (50 mL/Hr) IV Continuous <Continuous>  dextrose 50% Injectable 12.5 Gram(s) IV Push once  dextrose 50% Injectable 25 Gram(s) IV Push once  dextrose 50% Injectable 25 Gram(s) IV Push once  aspirin enteric coated 81 milliGRAM(s) Oral daily  insulin glargine Injectable (LANTUS) 10 Unit(s) SubCutaneous at bedtime  atorvastatin 40 milliGRAM(s) Oral at bedtime  metoprolol 50 milliGRAM(s) Oral two times a day  senna 2 Tablet(s) Oral at bedtime  docusate sodium 100 milliGRAM(s) Oral two times a day      VITAL:  T(C): , Max: 36.8 (08-21-17 @ 05:23)  T(F): , Max: 98.2 (08-21-17 @ 05:23)  HR: 50 (08-21-17 @ 13:08)  BP: 127/63 (08-21-17 @ 13:08)  BP(mean): --  RR: 18 (08-21-17 @ 13:08)  SpO2: 97% (08-21-17 @ 13:08)  Wt(kg): --    I and O's:        PHYSICAL EXAM:    Constitutional: NAD  HEENT: PERRLA    Neck:  No JVD  Respiratory: CTAB/L  Cardiovascular: S1 and S2  Gastrointestinal: BS+, soft, NT/ND  Extremities: No peripheral edema  Neurological: A/O x 3, no focal deficits  Psychiatric: Normal mood, normal affect  : No Rosenbaum  Skin: No rashes  Access: Not applicable    LABS:                        12.2   9.76  )-----------( 202      ( 21 Aug 2017 06:20 )             37.6     08-21    143  |  104  |  31<H>  ----------------------------<  97  4.1   |  21<L>  |  1.17    Ca    9.4      21 Aug 2017 06:20  Phos  3.3     08-21  Mg     2.1     08-21            Urine Studies:          RADIOLOGY & ADDITIONAL STUDIES:

## 2017-08-22 LAB
BASOPHILS # BLD AUTO: 0.06 K/UL — SIGNIFICANT CHANGE UP (ref 0–0.2)
BASOPHILS NFR BLD AUTO: 0.6 % — SIGNIFICANT CHANGE UP (ref 0–2)
BUN SERPL-MCNC: 28 MG/DL — HIGH (ref 7–23)
CALCIUM SERPL-MCNC: 9.3 MG/DL — SIGNIFICANT CHANGE UP (ref 8.4–10.5)
CHLORIDE SERPL-SCNC: 104 MMOL/L — SIGNIFICANT CHANGE UP (ref 98–107)
CO2 SERPL-SCNC: 19 MMOL/L — LOW (ref 22–31)
CREAT SERPL-MCNC: 1.23 MG/DL — SIGNIFICANT CHANGE UP (ref 0.5–1.3)
EOSINOPHIL # BLD AUTO: 0.17 K/UL — SIGNIFICANT CHANGE UP (ref 0–0.5)
EOSINOPHIL NFR BLD AUTO: 1.6 % — SIGNIFICANT CHANGE UP (ref 0–6)
GLUCOSE SERPL-MCNC: 131 MG/DL — HIGH (ref 70–99)
HCT VFR BLD CALC: 38.9 % — LOW (ref 39–50)
HGB BLD-MCNC: 12.7 G/DL — LOW (ref 13–17)
IMM GRANULOCYTES # BLD AUTO: 0.04 # — SIGNIFICANT CHANGE UP
IMM GRANULOCYTES NFR BLD AUTO: 0.4 % — SIGNIFICANT CHANGE UP (ref 0–1.5)
LYMPHOCYTES # BLD AUTO: 2.28 K/UL — SIGNIFICANT CHANGE UP (ref 1–3.3)
LYMPHOCYTES # BLD AUTO: 21.5 % — SIGNIFICANT CHANGE UP (ref 13–44)
MAGNESIUM SERPL-MCNC: 2.1 MG/DL — SIGNIFICANT CHANGE UP (ref 1.6–2.6)
MCHC RBC-ENTMCNC: 29.1 PG — SIGNIFICANT CHANGE UP (ref 27–34)
MCHC RBC-ENTMCNC: 32.6 % — SIGNIFICANT CHANGE UP (ref 32–36)
MCV RBC AUTO: 89 FL — SIGNIFICANT CHANGE UP (ref 80–100)
MONOCYTES # BLD AUTO: 0.95 K/UL — HIGH (ref 0–0.9)
MONOCYTES NFR BLD AUTO: 8.9 % — SIGNIFICANT CHANGE UP (ref 2–14)
NEUTROPHILS # BLD AUTO: 7.12 K/UL — SIGNIFICANT CHANGE UP (ref 1.8–7.4)
NEUTROPHILS NFR BLD AUTO: 67 % — SIGNIFICANT CHANGE UP (ref 43–77)
NRBC # FLD: 0 — SIGNIFICANT CHANGE UP
PLATELET # BLD AUTO: 212 K/UL — SIGNIFICANT CHANGE UP (ref 150–400)
PMV BLD: 11.6 FL — SIGNIFICANT CHANGE UP (ref 7–13)
POTASSIUM SERPL-MCNC: 4.2 MMOL/L — SIGNIFICANT CHANGE UP (ref 3.5–5.3)
POTASSIUM SERPL-SCNC: 4.2 MMOL/L — SIGNIFICANT CHANGE UP (ref 3.5–5.3)
RBC # BLD: 4.37 M/UL — SIGNIFICANT CHANGE UP (ref 4.2–5.8)
RBC # FLD: 13.6 % — SIGNIFICANT CHANGE UP (ref 10.3–14.5)
SODIUM SERPL-SCNC: 140 MMOL/L — SIGNIFICANT CHANGE UP (ref 135–145)
WBC # BLD: 10.62 K/UL — HIGH (ref 3.8–10.5)
WBC # FLD AUTO: 10.62 K/UL — HIGH (ref 3.8–10.5)

## 2017-08-22 RX ADMIN — SENNA PLUS 2 TABLET(S): 8.6 TABLET ORAL at 22:36

## 2017-08-22 RX ADMIN — Medication 50 MILLIGRAM(S): at 17:14

## 2017-08-22 RX ADMIN — SODIUM CHLORIDE 3 MILLILITER(S): 9 INJECTION INTRAMUSCULAR; INTRAVENOUS; SUBCUTANEOUS at 05:16

## 2017-08-22 RX ADMIN — SODIUM CHLORIDE 3 MILLILITER(S): 9 INJECTION INTRAMUSCULAR; INTRAVENOUS; SUBCUTANEOUS at 13:12

## 2017-08-22 RX ADMIN — Medication 100 MILLIGRAM(S): at 17:14

## 2017-08-22 RX ADMIN — SODIUM CHLORIDE 3 MILLILITER(S): 9 INJECTION INTRAMUSCULAR; INTRAVENOUS; SUBCUTANEOUS at 22:30

## 2017-08-22 RX ADMIN — Medication 81 MILLIGRAM(S): at 11:01

## 2017-08-22 RX ADMIN — INSULIN GLARGINE 10 UNIT(S): 100 INJECTION, SOLUTION SUBCUTANEOUS at 22:36

## 2017-08-22 RX ADMIN — Medication 50 MILLIGRAM(S): at 05:17

## 2017-08-22 RX ADMIN — ATORVASTATIN CALCIUM 40 MILLIGRAM(S): 80 TABLET, FILM COATED ORAL at 22:36

## 2017-08-22 NOTE — PROGRESS NOTE ADULT - ASSESSMENT
The patient is a natanael 82-year-old gentleman with past medical history of diabetes, hypertension, and coronary artery disease, status post coronary artery bypass graft, who presents with positive stress test, new decrease in ejection fraction, and new onset atrial fibrillation.    CKD stage 3    1.	Renal.  No renal objections to repeat angiogram from renal perspective.  No evidence of ANNETTA at present.  No need for Mucomyst at present.  2.	Cardiology.  Heart rate control per Cardiology.  Losartan was DC as was the HCTZ.  No pseudoaneurysm noted in right grion;  Cath timing per cards which may possibly be in am;  Off heparin gtt  3.	Endocrine.  TSH was within normal limits.

## 2017-08-22 NOTE — PROGRESS NOTE ADULT - SUBJECTIVE AND OBJECTIVE BOX
Vascular Surgery follow-up note    Vascular surgery re-called because primary team planning on doing further intervention and anticoagulating the patient.   Right groin soft with some bruising, improved from prior.  Please reconsult as needed if evidence of increasing or contralateral hematoma with procedure and/or anticoagulation.

## 2017-08-22 NOTE — PROGRESS NOTE ADULT - SUBJECTIVE AND OBJECTIVE BOX
[facility] [phone]        Patient seen and examined in bed.  He was in good spirits        MEDICATIONS  (STANDING):  sodium chloride 0.9% lock flush 3 milliLiter(s) IV Push every 8 hours  insulin lispro (HumaLOG) corrective regimen sliding scale   SubCutaneous three times a day before meals  dextrose 5%. 1000 milliLiter(s) (50 mL/Hr) IV Continuous <Continuous>  dextrose 50% Injectable 12.5 Gram(s) IV Push once  dextrose 50% Injectable 25 Gram(s) IV Push once  dextrose 50% Injectable 25 Gram(s) IV Push once  aspirin enteric coated 81 milliGRAM(s) Oral daily  insulin glargine Injectable (LANTUS) 10 Unit(s) SubCutaneous at bedtime  atorvastatin 40 milliGRAM(s) Oral at bedtime  metoprolol 50 milliGRAM(s) Oral two times a day  senna 2 Tablet(s) Oral at bedtime  docusate sodium 100 milliGRAM(s) Oral two times a day      VITAL:  T(C): , Max: 37.1 (08-22-17 @ 05:14)  T(F): , Max: 98.8 (08-22-17 @ 05:14)  HR: 53 (08-22-17 @ 13:46)  BP: 128/75 (08-22-17 @ 13:46)  BP(mean): --  RR: 18 (08-22-17 @ 13:46)  SpO2: 97% (08-22-17 @ 13:46)  Wt(kg): --    I and O's:        PHYSICAL EXAM:    Constitutional: NAD  HEENT: PERRLA    Neck:  No JVD  Respiratory: CTAB/L  Cardiovascular: S1 and S2  Gastrointestinal: BS+, soft, NT/ND  Extremities: No peripheral edema  Neurological: A/O x 3, no focal deficits  Psychiatric: Normal mood, normal affect  : No Rosenbaum  Skin: No rashes  Access: Not applicable    LABS:                        12.7   10.62 )-----------( 212      ( 22 Aug 2017 06:55 )             38.9     08-22    140  |  104  |  28<H>  ----------------------------<  131<H>  4.2   |  19<L>  |  1.23    Ca    9.3      22 Aug 2017 06:55  Phos  3.3     08-21  Mg     2.1     08-22            Urine Studies:          RADIOLOGY & ADDITIONAL STUDIES:

## 2017-08-22 NOTE — PROGRESS NOTE ADULT - SUBJECTIVE AND OBJECTIVE BOX
Subjective: No chest pain or sob       MEDICATIONS  (STANDING):  sodium chloride 0.9% lock flush 3 milliLiter(s) IV Push every 8 hours  insulin lispro (HumaLOG) corrective regimen sliding scale   SubCutaneous three times a day before meals  aspirin enteric coated 81 milliGRAM(s) Oral daily  insulin glargine Injectable (LANTUS) 10 Unit(s) SubCutaneous at bedtime  atorvastatin 40 milliGRAM(s) Oral at bedtime  metoprolol 50 milliGRAM(s) Oral two times a day  senna 2 Tablet(s) Oral at bedtime  docusate sodium 100 milliGRAM(s) Oral two times a day    LABS:                        12.7   10.62 )-----------( 212      ( 22 Aug 2017 06:55 )             38.9     Hemoglobin: 12.7 g/dL (08-22 @ 06:55)  Hemoglobin: 12.2 g/dL (08-21 @ 06:20)  Hemoglobin: 12.4 g/dL (08-20 @ 05:50)  Hemoglobin: 13.0 g/dL (08-19 @ 06:30)  Hemoglobin: 13.0 g/dL (08-19 @ 06:30)    08-22    140  |  104  |  28<H>  ----------------------------<  131<H>  4.2   |  19<L>  |  1.23    Ca    9.3      22 Aug 2017 06:55  Phos  3.3     08-21  Mg     2.1     08-22    Creatinine Trend: 1.23<--, 1.17<--, 1.16<--, 1.19<--, 1.24<--, 1.29<--    PHYSICAL EXAM  Vital Signs Last 24 Hrs  T(C): 37.1 (22 Aug 2017 05:14), Max: 37.1 (22 Aug 2017 05:14)  T(F): 98.8 (22 Aug 2017 05:14), Max: 98.8 (22 Aug 2017 05:14)  HR: 54 (22 Aug 2017 05:14) (50 - 54)  BP: 145/77 (22 Aug 2017 05:14) (124/65 - 145/79)  RR: 18 (22 Aug 2017 05:14) (18 - 18)  SpO2: 98% (22 Aug 2017 05:14) (96% - 98%)    Cardiovascular: Normal S1 S2,RRR  No JVD, No murmurs , Peripheral pulses palpable 2+ bilaterally  Respiratory: Lungs clear to auscultation, normal effort 	  Gastrointestinal:  Soft, Non-tender, + BS	  Extremities no clubbing, cyanosis or edema B/L LE's stable/ improved  R groin w/ noted  hematoma and ecchymosis     TELEMETRY: SR      < from: Transthoracic Echocardiogram (08.12.17 @ 07:13) >  CONCLUSIONS:  1. Normal mitral valve. Mild mitral regurgitation.  2. Calcified trileaflet aortic valve with normal opening.  3. Aortic Root: 4.0 cm.  4. Moderate left atrial enlargement.  5. Moderate concentric left ventricular hypertrophy.  6. Moderate global left ventricular dysfunction  (EF=40-45%).  7. Grade III diastolic dysfunction  8. Normal right atrium.  9. Normal right ventricular size and function.  10. RV systolic pressure is moderately increased (PASP 48mm  Hg).  11. There is mild tricuspid regurgitation.  12. There is mild pulmonic regurgitation.  13. Normal pericardium with no pericardial effusion.    ------------------------------------------------------------------------  Confirmed on  8/13/2017 - 08:29:04 by Paula Martinez MD    < end of copied text >    < from: Nuclear Stress Test-Pharmacologic (08.14.17 @ 09:57) >  IMPRESSIONS:  * Negative ECG evidence of ischemia after IV  administration of Regadenoson.  * Myocardial Perfusion SPECT results are abnormal.  * There is a medium sized, mild to moderate defect in the  inferior wall that is partially reversible, suggestive of  infarction with moderate steffi-infarct ischemia.  There is  a small, mild defect in the anterior wall that is  reversible, suggestive of ischemia.  There is a small,  mild to moderate defect in the lateral wall that is  partially reversible, suggestive of infarction with  mild-moderate steffi-infarct ischemia.  * Post-stress resting myocardial perfusion gated SPECT  imaging was performed (LVEF = 51 %).    Confirmed on  8/14/2017 - 12:44:15 at [facility] by Guru Joaquin M.D.    < end of copied text >    < from: Cardiac Cath Lab - Adult (08.15.17 @ 14:58) >  VENTRICLES: No left ventriculogram was performed.  CORONARY VESSELS: The coronary circulation is left dominant.  LM:   --  Distal left main: There was a discrete 95 % stenosis.  LAD:   --  Mid LAD: There was a 100 % stenosis with the distal vessel being  filled via the LIMA-LAD. This lesion is a chronic total occlusion.  CX:   --  Ostial circumflex: There was a discrete 95 % stenosis.  --  Proximal circumflex: The vessel was calcified. There was a diffuse 30 %  stenosis.  --  Mid circumflex: Angiography showed mild atherosclerosis with no flow  limiting lesions.  --  Distalcircumflex: Angiography showed mild atherosclerosis with no flow  limiting lesions.  --  OM1: Angiography showed minor luminal irregularities with no flow  limiting lesions.  --  OM2: Angiography showed minor luminal irregularities with no flow  limiting lesions.  RI:   --  Ramus intermedius: There was a tubular 70 % stenosis.  RCA:   --  RCA: The vessel was small sized. Angiography showed mild  atherosclerosis with no flow limiting lesions.  GRAFTS:   --  Graft to the LAD: The graft was a LIMA. Graft angiography  showed minor luminal irregularities. Distal vessel angiography showed mild  diffuse disease.  --  Graft to the ramus intermedius: The graft was a saphenous vein graft.  There was a 100 % stenosis at the graft ostium.  --  Graft to the 1st obtuse marginal: The graft was a saphenous vein graft.  There was a 100 % stenosis at the graft ostium.  AORTA: Ascending aorta: Normal. No additional grafts visualized in  aortogram.  COMPLICATIONS: There were no complications.  DIAGNOSTIC RECOMMENDATIONS: Coronary angiogram demonstrates patent LIMA-LAD  with closed SVG-RI and closed SVG-OM. In addition there is severe  atherosclerosis in the distal LM/ostial Cx/RI that is the culprit of the  patient's anginal symptoms. Will therefore perform staged PCI to the  protected LM/Cx/RI in near future when renally optimized/cr stable.  Prepared and signed by  Temitope Nuñez M.D.  Signed 08/16/2017 19:16:51    < end of copied text >    < from: US Duplex Arterial Lower Ext Ltd, Right (08.18.17 @ 10:02) >    Impression:    No pseudoaneurysm.    RTIA BENNETT M.D., ATTENDING RADIOLOGIST  This documenthas been electronically signed. Aug 18 2017 10:45AM    < end of copied text >      ASSESSMENT/PLAN: 	83y Male with h/o CAD s/p CABG, AF, HTN, HLD admitted with exertional angina, abnormal NST, EF 40-45%, Cath with severe distal LM and ostial Lcx disease.  Post cath Groin Hematoma.  US negative for pseudoaneurysm.      --vasc surgery eval apprecated  --For planned PCI to protected LM this week  --Monitor H/H  --Groin site stable, hematoma resolving    Vivienne Abernathy PA-C  [facility]  [address], [address]   [place] [address]  office [phone]  pager [phone]

## 2017-08-23 LAB
BASOPHILS # BLD AUTO: 0.04 K/UL — SIGNIFICANT CHANGE UP (ref 0–0.2)
BASOPHILS NFR BLD AUTO: 0.5 % — SIGNIFICANT CHANGE UP (ref 0–2)
BUN SERPL-MCNC: 26 MG/DL — HIGH (ref 7–23)
CALCIUM SERPL-MCNC: 9.4 MG/DL — SIGNIFICANT CHANGE UP (ref 8.4–10.5)
CHLORIDE SERPL-SCNC: 104 MMOL/L — SIGNIFICANT CHANGE UP (ref 98–107)
CO2 SERPL-SCNC: 25 MMOL/L — SIGNIFICANT CHANGE UP (ref 22–31)
CREAT SERPL-MCNC: 1.19 MG/DL — SIGNIFICANT CHANGE UP (ref 0.5–1.3)
EOSINOPHIL # BLD AUTO: 0.14 K/UL — SIGNIFICANT CHANGE UP (ref 0–0.5)
EOSINOPHIL NFR BLD AUTO: 1.7 % — SIGNIFICANT CHANGE UP (ref 0–6)
GLUCOSE SERPL-MCNC: 104 MG/DL — HIGH (ref 70–99)
HCT VFR BLD CALC: 36.7 % — LOW (ref 39–50)
HGB BLD-MCNC: 12 G/DL — LOW (ref 13–17)
IMM GRANULOCYTES # BLD AUTO: 0.02 # — SIGNIFICANT CHANGE UP
IMM GRANULOCYTES NFR BLD AUTO: 0.2 % — SIGNIFICANT CHANGE UP (ref 0–1.5)
LYMPHOCYTES # BLD AUTO: 1.63 K/UL — SIGNIFICANT CHANGE UP (ref 1–3.3)
LYMPHOCYTES # BLD AUTO: 19.3 % — SIGNIFICANT CHANGE UP (ref 13–44)
MAGNESIUM SERPL-MCNC: 2 MG/DL — SIGNIFICANT CHANGE UP (ref 1.6–2.6)
MCHC RBC-ENTMCNC: 29.2 PG — SIGNIFICANT CHANGE UP (ref 27–34)
MCHC RBC-ENTMCNC: 32.7 % — SIGNIFICANT CHANGE UP (ref 32–36)
MCV RBC AUTO: 89.3 FL — SIGNIFICANT CHANGE UP (ref 80–100)
MONOCYTES # BLD AUTO: 0.8 K/UL — SIGNIFICANT CHANGE UP (ref 0–0.9)
MONOCYTES NFR BLD AUTO: 9.5 % — SIGNIFICANT CHANGE UP (ref 2–14)
NEUTROPHILS # BLD AUTO: 5.82 K/UL — SIGNIFICANT CHANGE UP (ref 1.8–7.4)
NEUTROPHILS NFR BLD AUTO: 68.8 % — SIGNIFICANT CHANGE UP (ref 43–77)
NRBC # FLD: 0 — SIGNIFICANT CHANGE UP
PLATELET # BLD AUTO: 195 K/UL — SIGNIFICANT CHANGE UP (ref 150–400)
PMV BLD: 11.4 FL — SIGNIFICANT CHANGE UP (ref 7–13)
POTASSIUM SERPL-MCNC: 3.9 MMOL/L — SIGNIFICANT CHANGE UP (ref 3.5–5.3)
POTASSIUM SERPL-SCNC: 3.9 MMOL/L — SIGNIFICANT CHANGE UP (ref 3.5–5.3)
RBC # BLD: 4.11 M/UL — LOW (ref 4.2–5.8)
RBC # FLD: 13.7 % — SIGNIFICANT CHANGE UP (ref 10.3–14.5)
SODIUM SERPL-SCNC: 143 MMOL/L — SIGNIFICANT CHANGE UP (ref 135–145)
WBC # BLD: 8.45 K/UL — SIGNIFICANT CHANGE UP (ref 3.8–10.5)
WBC # FLD AUTO: 8.45 K/UL — SIGNIFICANT CHANGE UP (ref 3.8–10.5)

## 2017-08-23 RX ADMIN — ATORVASTATIN CALCIUM 40 MILLIGRAM(S): 80 TABLET, FILM COATED ORAL at 21:40

## 2017-08-23 RX ADMIN — Medication 100 MILLIGRAM(S): at 18:24

## 2017-08-23 RX ADMIN — SODIUM CHLORIDE 3 MILLILITER(S): 9 INJECTION INTRAMUSCULAR; INTRAVENOUS; SUBCUTANEOUS at 15:07

## 2017-08-23 RX ADMIN — SODIUM CHLORIDE 3 MILLILITER(S): 9 INJECTION INTRAMUSCULAR; INTRAVENOUS; SUBCUTANEOUS at 05:29

## 2017-08-23 RX ADMIN — Medication 50 MILLIGRAM(S): at 18:24

## 2017-08-23 RX ADMIN — SENNA PLUS 2 TABLET(S): 8.6 TABLET ORAL at 21:39

## 2017-08-23 RX ADMIN — Medication 81 MILLIGRAM(S): at 12:28

## 2017-08-23 RX ADMIN — Medication 100 MILLIGRAM(S): at 05:29

## 2017-08-23 RX ADMIN — Medication 50 MILLIGRAM(S): at 05:29

## 2017-08-23 RX ADMIN — INSULIN GLARGINE 10 UNIT(S): 100 INJECTION, SOLUTION SUBCUTANEOUS at 22:30

## 2017-08-23 RX ADMIN — SODIUM CHLORIDE 3 MILLILITER(S): 9 INJECTION INTRAMUSCULAR; INTRAVENOUS; SUBCUTANEOUS at 21:39

## 2017-08-23 NOTE — PROGRESS NOTE ADULT - SUBJECTIVE AND OBJECTIVE BOX
Subjective: No chest pain or sob       MEDICATIONS  (STANDING):  sodium chloride 0.9% lock flush 3 milliLiter(s) IV Push every 8 hours  insulin lispro (HumaLOG) corrective regimen sliding scale   SubCutaneous three times a day before meals  aspirin enteric coated 81 milliGRAM(s) Oral daily  insulin glargine Injectable (LANTUS) 10 Unit(s) SubCutaneous at bedtime  atorvastatin 40 milliGRAM(s) Oral at bedtime  metoprolol 50 milliGRAM(s) Oral two times a day  senna 2 Tablet(s) Oral at bedtime  docusate sodium 100 milliGRAM(s) Oral two times a day    LABS:                        12.0   8.45  )-----------( 195      ( 23 Aug 2017 07:00 )             36.7     Hemoglobin: 12.0 g/dL (08-23 @ 07:00)  Hemoglobin: 12.7 g/dL (08-22 @ 06:55)  Hemoglobin: 12.2 g/dL (08-21 @ 06:20)  Hemoglobin: 12.4 g/dL (08-20 @ 05:50)  Hemoglobin: 13.0 g/dL (08-19 @ 06:30)    08-23    143  |  104  |  26<H>  ----------------------------<  104<H>  3.9   |  25  |  1.19    Ca    9.4      23 Aug 2017 07:00  Mg     2.0     08-23    Creatinine Trend: 1.19<--, 1.23<--, 1.17<--, 1.16<--, 1.19<--, 1.24<--    PHYSICAL EXAM  Vital Signs Last 24 Hrs  T(C): 36.7 (23 Aug 2017 13:20), Max: 36.7 (22 Aug 2017 22:21)  T(F): 98.1 (23 Aug 2017 13:20), Max: 98.1 (23 Aug 2017 13:20)  HR: 50 (23 Aug 2017 13:20) (50 - 64)  BP: 140/71 (23 Aug 2017 13:20) (129/69 - 140/73)  RR: 18 (23 Aug 2017 13:20) (18 - 18)  SpO2: 97% (23 Aug 2017 13:20) (97% - 98%)      Cardiovascular: Normal S1 S2,RRR  No JVD, No murmurs , Peripheral pulses palpable 2+ bilaterally  Respiratory: Lungs clear to auscultation, normal effort 	  Gastrointestinal:  Soft, Non-tender, + BS	  Extremities no clubbing, cyanosis or edema B/L LE's stable/ improved  R groin w/ noted  hematoma and ecchymosis     TELEMETRY: SR      < from: Transthoracic Echocardiogram (08.12.17 @ 07:13) >  CONCLUSIONS:  1. Normal mitral valve. Mild mitral regurgitation.  2. Calcified trileaflet aortic valve with normal opening.  3. Aortic Root: 4.0 cm.  4. Moderate left atrial enlargement.  5. Moderate concentric left ventricular hypertrophy.  6. Moderate global left ventricular dysfunction  (EF=40-45%).  7. Grade III diastolic dysfunction  8. Normal right atrium.  9. Normal right ventricular size and function.  10. RV systolic pressure is moderately increased (PASP 48mm  Hg).  11. There is mild tricuspid regurgitation.  12. There is mild pulmonic regurgitation.  13. Normal pericardium with no pericardial effusion.    ------------------------------------------------------------------------  Confirmed on  8/13/2017 - 08:29:04 by Paula Martinez MD    < end of copied text >    < from: Nuclear Stress Test-Pharmacologic (08.14.17 @ 09:57) >  IMPRESSIONS:  * Negative ECG evidence of ischemia after IV  administration of Regadenoson.  * Myocardial Perfusion SPECT results are abnormal.  * There is a medium sized, mild to moderate defect in the  inferior wall that is partially reversible, suggestive of  infarction with moderate steffi-infarct ischemia.  There is  a small, mild defect in the anterior wall that is  reversible, suggestive of ischemia.  There is a small,  mild to moderate defect in the lateral wall that is  partially reversible, suggestive of infarction with  mild-moderate steffi-infarct ischemia.  * Post-stress resting myocardial perfusion gated SPECT  imaging was performed (LVEF = 51 %).    Confirmed on  8/14/2017 - 12:44:15 at [place] by Guru Joaquin M.D.    < end of copied text >    < from: Cardiac Cath Lab - Adult (08.15.17 @ 14:58) >  VENTRICLES: No left ventriculogram was performed.  CORONARY VESSELS: The coronary circulation is left dominant.  LM:   --  Distal left main: There was a discrete 95 % stenosis.  LAD:   --  Mid LAD: There was a 100 % stenosis with the distal vessel being  filled via the LIMA-LAD. This lesion is a chronic total occlusion.  CX:   --  Ostial circumflex: There was a discrete 95 % stenosis.  --  Proximal circumflex: The vessel was calcified. There was a diffuse 30 %  stenosis.  --  Mid circumflex: Angiography showed mild atherosclerosis with no flow  limiting lesions.  --  Distalcircumflex: Angiography showed mild atherosclerosis with no flow  limiting lesions.  --  OM1: Angiography showed minor luminal irregularities with no flow  limiting lesions.  --  OM2: Angiography showed minor luminal irregularities with no flow  limiting lesions.  RI:   --  Ramus intermedius: There was a tubular 70 % stenosis.  RCA:   --  RCA: The vessel was small sized. Angiography showed mild  atherosclerosis with no flow limiting lesions.  GRAFTS:   --  Graft to the LAD: The graft was a LIMA. Graft angiography  showed minor luminal irregularities. Distal vessel angiography showed mild  diffuse disease.  --  Graft to the ramus intermedius: The graft was a saphenous vein graft.  There was a 100 % stenosis at the graft ostium.  --  Graft to the 1st obtuse marginal: The graft was a saphenous vein graft.  There was a 100 % stenosis at the graft ostium.  AORTA: Ascending aorta: Normal. No additional grafts visualized in  aortogram.  COMPLICATIONS: There were no complications.  DIAGNOSTIC RECOMMENDATIONS: Coronary angiogram demonstrates patent LIMA-LAD  with closed SVG-RI and closed SVG-OM. In addition there is severe  atherosclerosis in the distal LM/ostial Cx/RI that is the culprit of the  patient's anginal symptoms. Will therefore perform staged PCI to the  protected LM/Cx/RI in near future when renally optimized/cr stable.  Prepared and signed by  Temitope Nuñez M.D.  Signed 08/16/2017 19:16:51    < end of copied text >    < from: US Duplex Arterial Lower Ext Ltd, Right (08.18.17 @ 10:02) >    Impression:    No pseudoaneurysm.    RITA BENNETT M.D., ATTENDING RADIOLOGIST  This documenthas been electronically signed. Aug 18 2017 10:45AM    < end of copied text >      ASSESSMENT/PLAN: 	83y Male with h/o CAD s/p CABG, AF, HTN, HLD admitted with exertional angina, abnormal NST, EF 40-45%, Cath with severe distal LM and ostial Lcx disease.  Post cath Groin Hematoma.  US negative for pseudoaneurysm.      --vasc surgery eval apprecated  --For planned PCI to protected LM this week  --Monitor H/H  --Groin site stable, hematoma resolving    Vivienne Abernathy PA-C  [facility]  [address], [address]   [place] [address]  office [phone]  pager [phone]

## 2017-08-23 NOTE — PROGRESS NOTE ADULT - ASSESSMENT
The patient is a natanael 82-year-old gentleman with past medical history of diabetes, hypertension, and coronary artery disease, status post coronary artery bypass graft, who presents with positive stress test, new decrease in ejection fraction, and new onset atrial fibrillation.    CKD stage 3    1.	Renal.  No renal objections to repeat angiogram from renal perspective.  No evidence of ANNETTA at present.  No need for Mucomyst at present.  2.	Cardiology.  Heart rate control per Cardiology.  Losartan was DC as was the HCTZ.  No pseudoaneurysm noted in right grion;  Cath timing per cards which may be today;  Off heparin gtt  3.	Endocrine.  TSH was within normal limits.

## 2017-08-23 NOTE — PROGRESS NOTE ADULT - SUBJECTIVE AND OBJECTIVE BOX
[facility] [phone]        Patient seen and examined in bed.  Uneventful night        MEDICATIONS  (STANDING):  sodium chloride 0.9% lock flush 3 milliLiter(s) IV Push every 8 hours  insulin lispro (HumaLOG) corrective regimen sliding scale   SubCutaneous three times a day before meals  dextrose 5%. 1000 milliLiter(s) (50 mL/Hr) IV Continuous <Continuous>  dextrose 50% Injectable 12.5 Gram(s) IV Push once  dextrose 50% Injectable 25 Gram(s) IV Push once  dextrose 50% Injectable 25 Gram(s) IV Push once  aspirin enteric coated 81 milliGRAM(s) Oral daily  insulin glargine Injectable (LANTUS) 10 Unit(s) SubCutaneous at bedtime  atorvastatin 40 milliGRAM(s) Oral at bedtime  metoprolol 50 milliGRAM(s) Oral two times a day  senna 2 Tablet(s) Oral at bedtime  docusate sodium 100 milliGRAM(s) Oral two times a day      VITAL:  T(C): , Max: 36.7 (08-22-17 @ 22:21)  T(F): , Max: 98 (08-22-17 @ 22:21)  HR: 64 (08-23-17 @ 05:28)  BP: 140/73 (08-23-17 @ 05:28)  BP(mean): --  RR: 18 (08-23-17 @ 05:28)  SpO2: 97% (08-23-17 @ 05:28)  Wt(kg): --    I and O's:        PHYSICAL EXAM:    Constitutional: NAD  HEENT: PERRLA    Neck:  No JVD  Respiratory: CTAB/L  Cardiovascular: S1 and S2  Gastrointestinal: BS+, soft, NT/ND  Extremities: No peripheral edema  Neurological: A/O x 3, no focal deficits  Psychiatric: Normal mood, normal affect  : No Rosenbaum  Skin: No rashes  Access: Not applicable    LABS:                        12.0   8.45  )-----------( 195      ( 23 Aug 2017 07:00 )             36.7     08-23    143  |  104  |  26<H>  ----------------------------<  104<H>  3.9   |  25  |  1.19    Ca    9.4      23 Aug 2017 07:00  Mg     2.0     08-23

## 2017-08-24 ENCOUNTER — TRANSCRIPTION ENCOUNTER (OUTPATIENT)
Age: 82
End: 2017-08-24

## 2017-08-24 LAB
BASOPHILS # BLD AUTO: 0.04 K/UL — SIGNIFICANT CHANGE UP (ref 0–0.2)
BASOPHILS NFR BLD AUTO: 0.4 % — SIGNIFICANT CHANGE UP (ref 0–2)
BUN SERPL-MCNC: 24 MG/DL — HIGH (ref 7–23)
CALCIUM SERPL-MCNC: 9.5 MG/DL — SIGNIFICANT CHANGE UP (ref 8.4–10.5)
CHLORIDE SERPL-SCNC: 104 MMOL/L — SIGNIFICANT CHANGE UP (ref 98–107)
CO2 SERPL-SCNC: 22 MMOL/L — SIGNIFICANT CHANGE UP (ref 22–31)
CREAT SERPL-MCNC: 1.14 MG/DL — SIGNIFICANT CHANGE UP (ref 0.5–1.3)
EOSINOPHIL # BLD AUTO: 0.16 K/UL — SIGNIFICANT CHANGE UP (ref 0–0.5)
EOSINOPHIL NFR BLD AUTO: 1.8 % — SIGNIFICANT CHANGE UP (ref 0–6)
GLUCOSE SERPL-MCNC: 108 MG/DL — HIGH (ref 70–99)
HCT VFR BLD CALC: 36 % — LOW (ref 39–50)
HGB BLD-MCNC: 12.1 G/DL — LOW (ref 13–17)
IMM GRANULOCYTES # BLD AUTO: 0.03 # — SIGNIFICANT CHANGE UP
IMM GRANULOCYTES NFR BLD AUTO: 0.3 % — SIGNIFICANT CHANGE UP (ref 0–1.5)
LYMPHOCYTES # BLD AUTO: 1.77 K/UL — SIGNIFICANT CHANGE UP (ref 1–3.3)
LYMPHOCYTES # BLD AUTO: 19.6 % — SIGNIFICANT CHANGE UP (ref 13–44)
MAGNESIUM SERPL-MCNC: 2 MG/DL — SIGNIFICANT CHANGE UP (ref 1.6–2.6)
MCHC RBC-ENTMCNC: 30.1 PG — SIGNIFICANT CHANGE UP (ref 27–34)
MCHC RBC-ENTMCNC: 33.6 % — SIGNIFICANT CHANGE UP (ref 32–36)
MCV RBC AUTO: 89.6 FL — SIGNIFICANT CHANGE UP (ref 80–100)
MONOCYTES # BLD AUTO: 0.92 K/UL — HIGH (ref 0–0.9)
MONOCYTES NFR BLD AUTO: 10.2 % — SIGNIFICANT CHANGE UP (ref 2–14)
NEUTROPHILS # BLD AUTO: 6.09 K/UL — SIGNIFICANT CHANGE UP (ref 1.8–7.4)
NEUTROPHILS NFR BLD AUTO: 67.7 % — SIGNIFICANT CHANGE UP (ref 43–77)
NRBC # FLD: 0 — SIGNIFICANT CHANGE UP
PLATELET # BLD AUTO: 192 K/UL — SIGNIFICANT CHANGE UP (ref 150–400)
PMV BLD: 11.2 FL — SIGNIFICANT CHANGE UP (ref 7–13)
POTASSIUM SERPL-MCNC: 4.2 MMOL/L — SIGNIFICANT CHANGE UP (ref 3.5–5.3)
POTASSIUM SERPL-SCNC: 4.2 MMOL/L — SIGNIFICANT CHANGE UP (ref 3.5–5.3)
RBC # BLD: 4.02 M/UL — LOW (ref 4.2–5.8)
RBC # FLD: 13.7 % — SIGNIFICANT CHANGE UP (ref 10.3–14.5)
SODIUM SERPL-SCNC: 143 MMOL/L — SIGNIFICANT CHANGE UP (ref 135–145)
WBC # BLD: 9.01 K/UL — SIGNIFICANT CHANGE UP (ref 3.8–10.5)
WBC # FLD AUTO: 9.01 K/UL — SIGNIFICANT CHANGE UP (ref 3.8–10.5)

## 2017-08-24 RX ORDER — HEPARIN SODIUM 5000 [USP'U]/ML
1200 INJECTION INTRAVENOUS; SUBCUTANEOUS
Qty: 25000 | Refills: 0 | Status: DISCONTINUED | OUTPATIENT
Start: 2017-08-24 | End: 2017-08-25

## 2017-08-24 RX ADMIN — INSULIN GLARGINE 10 UNIT(S): 100 INJECTION, SOLUTION SUBCUTANEOUS at 22:16

## 2017-08-24 RX ADMIN — SODIUM CHLORIDE 3 MILLILITER(S): 9 INJECTION INTRAMUSCULAR; INTRAVENOUS; SUBCUTANEOUS at 13:01

## 2017-08-24 RX ADMIN — Medication 50 MILLIGRAM(S): at 17:45

## 2017-08-24 RX ADMIN — Medication 100 MILLIGRAM(S): at 17:45

## 2017-08-24 RX ADMIN — Medication 100 MILLIGRAM(S): at 05:05

## 2017-08-24 RX ADMIN — SODIUM CHLORIDE 3 MILLILITER(S): 9 INJECTION INTRAMUSCULAR; INTRAVENOUS; SUBCUTANEOUS at 22:15

## 2017-08-24 RX ADMIN — HEPARIN SODIUM 12 UNIT(S)/HR: 5000 INJECTION INTRAVENOUS; SUBCUTANEOUS at 18:32

## 2017-08-24 RX ADMIN — Medication 1: at 17:44

## 2017-08-24 RX ADMIN — SODIUM CHLORIDE 3 MILLILITER(S): 9 INJECTION INTRAMUSCULAR; INTRAVENOUS; SUBCUTANEOUS at 05:05

## 2017-08-24 RX ADMIN — ATORVASTATIN CALCIUM 40 MILLIGRAM(S): 80 TABLET, FILM COATED ORAL at 22:15

## 2017-08-24 RX ADMIN — Medication 81 MILLIGRAM(S): at 11:29

## 2017-08-24 NOTE — DIETITIAN INITIAL EVALUATION ADULT. - OTHER INFO
Pt seen for LOS day 9. Reports good appetite and PO intake. Patient denies any nausea/vomiting/diarrhea/constipation or difficulty chewing and swallowing. NKFA. Requesting Mrs. FORTE-jennifer will be provided with tray. Diet recommendations reviewed with patient. Verbalized good understanding. Patient made aware RDN remains available.

## 2017-08-24 NOTE — PROGRESS NOTE ADULT - SUBJECTIVE AND OBJECTIVE BOX
[facility] [phone]        Patient seen and examined in bed.  He is getting his cath today        MEDICATIONS  (STANDING):  sodium chloride 0.9% lock flush 3 milliLiter(s) IV Push every 8 hours  insulin lispro (HumaLOG) corrective regimen sliding scale   SubCutaneous three times a day before meals  dextrose 5%. 1000 milliLiter(s) (50 mL/Hr) IV Continuous <Continuous>  dextrose 50% Injectable 12.5 Gram(s) IV Push once  dextrose 50% Injectable 25 Gram(s) IV Push once  dextrose 50% Injectable 25 Gram(s) IV Push once  aspirin enteric coated 81 milliGRAM(s) Oral daily  insulin glargine Injectable (LANTUS) 10 Unit(s) SubCutaneous at bedtime  atorvastatin 40 milliGRAM(s) Oral at bedtime  metoprolol 50 milliGRAM(s) Oral two times a day  senna 2 Tablet(s) Oral at bedtime  docusate sodium 100 milliGRAM(s) Oral two times a day      VITAL:  T(C): , Max: 36.7 (08-23-17 @ 20:08)  T(F): , Max: 98.1 (08-24-17 @ 05:00)  HR: 47 (08-24-17 @ 05:00)  BP: 139/59 (08-24-17 @ 05:00)  BP(mean): --  RR: 18 (08-24-17 @ 05:00)  SpO2: 98% (08-24-17 @ 05:00)  Wt(kg): --    I and O's:        PHYSICAL EXAM:    Constitutional: NAD  HEENT: PERRLA    Neck:  No JVD  Respiratory: CTAB/L  Cardiovascular: S1 and S2  Gastrointestinal: BS+, soft, NT/ND  Extremities: No peripheral edema  Neurological: A/O x 3, no focal deficits  Psychiatric: Normal mood, normal affect  : No Rosenbaum  Skin: No rashes  Access: Not applicable    LABS:                        12.1   9.01  )-----------( 192      ( 24 Aug 2017 06:17 )             36.0     08-24    143  |  104  |  24<H>  ----------------------------<  108<H>  4.2   |  22  |  1.14    Ca    9.5      24 Aug 2017 06:17  Mg     2.0     08-24            Urine Studies:          RADIOLOGY & ADDITIONAL STUDIES:

## 2017-08-24 NOTE — PROGRESS NOTE ADULT - SUBJECTIVE AND OBJECTIVE BOX
Subjective: No chest pain or sob       MEDICATIONS  (STANDING):  sodium chloride 0.9% lock flush 3 milliLiter(s) IV Push every 8 hours  insulin lispro (HumaLOG) corrective regimen sliding scale   SubCutaneous three times a day before meals  aspirin enteric coated 81 milliGRAM(s) Oral daily  insulin glargine Injectable (LANTUS) 10 Unit(s) SubCutaneous at bedtime  atorvastatin 40 milliGRAM(s) Oral at bedtime  metoprolol 50 milliGRAM(s) Oral two times a day  senna 2 Tablet(s) Oral at bedtime  docusate sodium 100 milliGRAM(s) Oral two times a day    MEDICATIONS  (PRN):  dextrose Gel 1 Dose(s) Oral once PRN Blood Glucose LESS THAN 70 milliGRAM(s)/deciliter  glucagon  Injectable 1 milliGRAM(s) IntraMuscular once PRN Glucose LESS THAN 70 milligrams/deciliter  acetaminophen   Tablet. 650 milliGRAM(s) Oral every 6 hours PRN Moderate Pain (4 - 6)  polyethylene glycol 3350 17 Gram(s) Oral daily PRN Constipation      LABS:                        12.1   9.01  )-----------( 192      ( 24 Aug 2017 06:17 )             36.0     Hemoglobin: 12.1 g/dL (08-24 @ 06:17)  Hemoglobin: 12.0 g/dL (08-23 @ 07:00)  Hemoglobin: 12.7 g/dL (08-22 @ 06:55)  Hemoglobin: 12.2 g/dL (08-21 @ 06:20)  Hemoglobin: 12.4 g/dL (08-20 @ 05:50)    08-24    143  |  104  |  24<H>  ----------------------------<  108<H>  4.2   |  22  |  1.14    Ca    9.5      24 Aug 2017 06:17  Mg     2.0     08-24    Creatinine Trend: 1.14<--, 1.19<--, 1.23<--, 1.17<--, 1.16<--, 1.19<--    PHYSICAL EXAM  Vital Signs Last 24 Hrs  T(C): 36.7 (24 Aug 2017 05:00), Max: 36.7 (23 Aug 2017 20:08)  T(F): 98.1 (24 Aug 2017 05:00), Max: 98.1 (24 Aug 2017 05:00)  HR: 47 (24 Aug 2017 05:00) (47 - 61)  BP: 139/59 (24 Aug 2017 05:00) (133/62 - 141/65)  RR: 18 (24 Aug 2017 05:00) (18 - 18)  SpO2: 98% (24 Aug 2017 05:00) (98% - 98%)      Cardiovascular: Normal S1 S2,RRR  No JVD, No murmurs , Peripheral pulses palpable 2+ bilaterally  Respiratory: Lungs clear to auscultation, normal effort 	  Gastrointestinal:  Soft, Non-tender, + BS	  Extremities no clubbing, cyanosis or edema B/L LE's stable/ improved  R groin w/ noted  hematoma and ecchymosis     TELEMETRY: SR      < from: Transthoracic Echocardiogram (08.12.17 @ 07:13) >  CONCLUSIONS:  1. Normal mitral valve. Mild mitral regurgitation.  2. Calcified trileaflet aortic valve with normal opening.  3. Aortic Root: 4.0 cm.  4. Moderate left atrial enlargement.  5. Moderate concentric left ventricular hypertrophy.  6. Moderate global left ventricular dysfunction  (EF=40-45%).  7. Grade III diastolic dysfunction  8. Normal right atrium.  9. Normal right ventricular size and function.  10. RV systolic pressure is moderately increased (PASP 48mm  Hg).  11. There is mild tricuspid regurgitation.  12. There is mild pulmonic regurgitation.  13. Normal pericardium with no pericardial effusion.    ------------------------------------------------------------------------  Confirmed on  8/13/2017 - 08:29:04 by Paula Martinez MD    < end of copied text >    < from: Nuclear Stress Test-Pharmacologic (08.14.17 @ 09:57) >  IMPRESSIONS:  * Negative ECG evidence of ischemia after IV  administration of Regadenoson.  * Myocardial Perfusion SPECT results are abnormal.  * There is a medium sized, mild to moderate defect in the  inferior wall that is partially reversible, suggestive of  infarction with moderate steffi-infarct ischemia.  There is  a small, mild defect in the anterior wall that is  reversible, suggestive of ischemia.  There is a small,  mild to moderate defect in the lateral wall that is  partially reversible, suggestive of infarction with  mild-moderate steffi-infarct ischemia.  * Post-stress resting myocardial perfusion gated SPECT  imaging was performed (LVEF = 51 %).    Confirmed on  8/14/2017 - 12:44:15 at [place] by Guru Joaquin M.D.    < end of copied text >    < from: Cardiac Cath Lab - Adult (08.15.17 @ 14:58) >  VENTRICLES: No left ventriculogram was performed.  CORONARY VESSELS: The coronary circulation is left dominant.  LM:   --  Distal left main: There was a discrete 95 % stenosis.  LAD:   --  Mid LAD: There was a 100 % stenosis with the distal vessel being  filled via the LIMA-LAD. This lesion is a chronic total occlusion.  CX:   --  Ostial circumflex: There was a discrete 95 % stenosis.  --  Proximal circumflex: The vessel was calcified. There was a diffuse 30 %  stenosis.  --  Mid circumflex: Angiography showed mild atherosclerosis with no flow  limiting lesions.  --  Distalcircumflex: Angiography showed mild atherosclerosis with no flow  limiting lesions.  --  OM1: Angiography showed minor luminal irregularities with no flow  limiting lesions.  --  OM2: Angiography showed minor luminal irregularities with no flow  limiting lesions.  RI:   --  Ramus intermedius: There was a tubular 70 % stenosis.  RCA:   --  RCA: The vessel was small sized. Angiography showed mild  atherosclerosis with no flow limiting lesions.  GRAFTS:   --  Graft to the LAD: The graft was a LIMA. Graft angiography  showed minor luminal irregularities. Distal vessel angiography showed mild  diffuse disease.  --  Graft to the ramus intermedius: The graft was a saphenous vein graft.  There was a 100 % stenosis at the graft ostium.  --  Graft to the 1st obtuse marginal: The graft was a saphenous vein graft.  There was a 100 % stenosis at the graft ostium.  AORTA: Ascending aorta: Normal. No additional grafts visualized in  aortogram.  COMPLICATIONS: There were no complications.  DIAGNOSTIC RECOMMENDATIONS: Coronary angiogram demonstrates patent LIMA-LAD  with closed SVG-RI and closed SVG-OM. In addition there is severe  atherosclerosis in the distal LM/ostial Cx/RI that is the culprit of the  patient's anginal symptoms. Will therefore perform staged PCI to the  protected LM/Cx/RI in near future when renally optimized/cr stable.  Prepared and signed by  Temitope Nuñez M.D.  Signed 08/16/2017 19:16:51    < end of copied text >    < from: US Duplex Arterial Lower Ext Ltd, Right (08.18.17 @ 10:02) >    Impression:    No pseudoaneurysm.    RITA BENNETT M.D., ATTENDING RADIOLOGIST  This documenthas been electronically signed. Aug 18 2017 10:45AM    < end of copied text >      ASSESSMENT/PLAN: 	83y Male with h/o CAD s/p CABG, AF, HTN, HLD admitted with exertional angina, abnormal NST, EF 40-45%, Cath with severe distal LM and ostial Lcx disease.  Post cath Groin Hematoma.  US negative for pseudoaneurysm.      --For planned PCI to protected LM   --Monitor H/H  --Groin site stable, hematoma resolving  --eventually resume AC for PAFib    Vivienne Abernathy PA-C  [facility]  [address], [address]   [place] [address]  office [phone]  pager [phone]

## 2017-08-24 NOTE — DIETITIAN INITIAL EVALUATION ADULT. - ENERGY NEEDS
Current weight 243.6lbs Ht 6'0" BMI 33kg/m2  Of note, pt with fluctuating weight noted this admission possibly due to fluid shift  +1 edema b/l foot and ankle. Skin intact.

## 2017-08-24 NOTE — DIETITIAN INITIAL EVALUATION ADULT. - NS AS NUTRI INTERV MEALS SNACK
Carbohydrate - modified diet/Fat - modified diet/1. Continue current diet order, which remains appropriate at this time. 2. Monitor weights, labs, BM's, skin integrity, p.o. intake./Other (specify)/Mineral - modified diet

## 2017-08-24 NOTE — DIETITIAN INITIAL EVALUATION ADULT. - PERTINENT LABORATORY DATA
08-24 Na143 mmol/L Glu 108 mg/dL<H> K+ 4.2 mmol/L Cr  1.14 mg/dL BUN 24 mg/dL<H> Phos n/a   Alb n/a   PAB n/a

## 2017-08-24 NOTE — PROGRESS NOTE ADULT - ASSESSMENT
The patient is a natanael 82-year-old gentleman with past medical history of diabetes, hypertension, and coronary artery disease, status post coronary artery bypass graft, who presents with positive stress test, new decrease in ejection fraction, and new onset atrial fibrillation.    CKD stage 3    1.	Renal.  No renal objections to repeat angiogram from renal perspective.  No evidence of ANNETTA at present.  No need for Mucomyst at present.  2.	Cardiology.  Heart rate control per Cardiology.  Losartan was DC as was the HCTZ.  No pseudoaneurysm noted in right grion;  Cath today  3.	Endocrine.  TSH was within normal limits.

## 2017-08-25 ENCOUNTER — TRANSCRIPTION ENCOUNTER (OUTPATIENT)
Age: 82
End: 2017-08-25

## 2017-08-25 ENCOUNTER — INPATIENT (INPATIENT)
Facility: HOSPITAL | Age: 82
LOS: 0 days | Discharge: HOME | DRG: 247 | End: 2017-08-26
Attending: INTERNAL MEDICINE | Admitting: INTERNAL MEDICINE
Payer: MEDICARE

## 2017-08-25 VITALS
OXYGEN SATURATION: 98 % | HEART RATE: 50 BPM | SYSTOLIC BLOOD PRESSURE: 138 MMHG | TEMPERATURE: 98 F | DIASTOLIC BLOOD PRESSURE: 62 MMHG | RESPIRATION RATE: 17 BRPM

## 2017-08-25 VITALS
RESPIRATION RATE: 18 BRPM | TEMPERATURE: 98 F | HEART RATE: 71 BPM | DIASTOLIC BLOOD PRESSURE: 73 MMHG | SYSTOLIC BLOOD PRESSURE: 113 MMHG | OXYGEN SATURATION: 100 %

## 2017-08-25 DIAGNOSIS — Z98.49 CATARACT EXTRACTION STATUS, UNSPECIFIED EYE: Chronic | ICD-10-CM

## 2017-08-25 DIAGNOSIS — Z85.828 PERSONAL HISTORY OF OTHER MALIGNANT NEOPLASM OF SKIN: Chronic | ICD-10-CM

## 2017-08-25 DIAGNOSIS — Z95.1 PRESENCE OF AORTOCORONARY BYPASS GRAFT: Chronic | ICD-10-CM

## 2017-08-25 DIAGNOSIS — I25.10 ATHEROSCLEROTIC HEART DISEASE OF NATIVE CORONARY ARTERY WITHOUT ANGINA PECTORIS: ICD-10-CM

## 2017-08-25 LAB
APTT BLD: 30.6 SEC — SIGNIFICANT CHANGE UP (ref 27.5–37.4)
APTT BLD: 73.1 SEC — HIGH (ref 27.5–37.4)
BASOPHILS # BLD AUTO: 0.04 K/UL — SIGNIFICANT CHANGE UP (ref 0–0.2)
BASOPHILS NFR BLD AUTO: 0.4 % — SIGNIFICANT CHANGE UP (ref 0–2)
BUN SERPL-MCNC: 24 MG/DL — HIGH (ref 7–23)
CALCIUM SERPL-MCNC: 9.3 MG/DL — SIGNIFICANT CHANGE UP (ref 8.4–10.5)
CHLORIDE SERPL-SCNC: 103 MMOL/L — SIGNIFICANT CHANGE UP (ref 98–107)
CO2 SERPL-SCNC: 23 MMOL/L — SIGNIFICANT CHANGE UP (ref 22–31)
CREAT SERPL-MCNC: 1.18 MG/DL — SIGNIFICANT CHANGE UP (ref 0.5–1.3)
EOSINOPHIL # BLD AUTO: 0.2 K/UL — SIGNIFICANT CHANGE UP (ref 0–0.5)
EOSINOPHIL NFR BLD AUTO: 2.1 % — SIGNIFICANT CHANGE UP (ref 0–6)
GLUCOSE SERPL-MCNC: 122 MG/DL — HIGH (ref 70–99)
HCT VFR BLD CALC: 35.7 % — LOW (ref 39–50)
HCT VFR BLD CALC: 35.8 % — LOW (ref 39–50)
HGB BLD-MCNC: 11.8 G/DL — LOW (ref 13–17)
HGB BLD-MCNC: 11.9 G/DL — LOW (ref 13–17)
IMM GRANULOCYTES # BLD AUTO: 0.03 # — SIGNIFICANT CHANGE UP
IMM GRANULOCYTES NFR BLD AUTO: 0.3 % — SIGNIFICANT CHANGE UP (ref 0–1.5)
LYMPHOCYTES # BLD AUTO: 1.96 K/UL — SIGNIFICANT CHANGE UP (ref 1–3.3)
LYMPHOCYTES # BLD AUTO: 20.9 % — SIGNIFICANT CHANGE UP (ref 13–44)
MAGNESIUM SERPL-MCNC: 2 MG/DL — SIGNIFICANT CHANGE UP (ref 1.6–2.6)
MCHC RBC-ENTMCNC: 29.8 PG — SIGNIFICANT CHANGE UP (ref 27–34)
MCHC RBC-ENTMCNC: 30.1 PG — SIGNIFICANT CHANGE UP (ref 27–34)
MCHC RBC-ENTMCNC: 33 % — SIGNIFICANT CHANGE UP (ref 32–36)
MCHC RBC-ENTMCNC: 33.3 % — SIGNIFICANT CHANGE UP (ref 32–36)
MCV RBC AUTO: 90.2 FL — SIGNIFICANT CHANGE UP (ref 80–100)
MCV RBC AUTO: 90.4 FL — SIGNIFICANT CHANGE UP (ref 80–100)
MONOCYTES # BLD AUTO: 0.91 K/UL — HIGH (ref 0–0.9)
MONOCYTES NFR BLD AUTO: 9.7 % — SIGNIFICANT CHANGE UP (ref 2–14)
NEUTROPHILS # BLD AUTO: 6.23 K/UL — SIGNIFICANT CHANGE UP (ref 1.8–7.4)
NEUTROPHILS NFR BLD AUTO: 66.6 % — SIGNIFICANT CHANGE UP (ref 43–77)
NRBC # FLD: 0 — SIGNIFICANT CHANGE UP
NRBC # FLD: 0 — SIGNIFICANT CHANGE UP
PLATELET # BLD AUTO: 191 K/UL — SIGNIFICANT CHANGE UP (ref 150–400)
PLATELET # BLD AUTO: 199 K/UL — SIGNIFICANT CHANGE UP (ref 150–400)
PMV BLD: 10.9 FL — SIGNIFICANT CHANGE UP (ref 7–13)
PMV BLD: 11.2 FL — SIGNIFICANT CHANGE UP (ref 7–13)
POTASSIUM SERPL-MCNC: 4.2 MMOL/L — SIGNIFICANT CHANGE UP (ref 3.5–5.3)
POTASSIUM SERPL-SCNC: 4.2 MMOL/L — SIGNIFICANT CHANGE UP (ref 3.5–5.3)
RBC # BLD: 3.96 M/UL — LOW (ref 4.2–5.8)
RBC # BLD: 3.96 M/UL — LOW (ref 4.2–5.8)
RBC # FLD: 13.6 % — SIGNIFICANT CHANGE UP (ref 10.3–14.5)
RBC # FLD: 13.7 % — SIGNIFICANT CHANGE UP (ref 10.3–14.5)
SODIUM SERPL-SCNC: 142 MMOL/L — SIGNIFICANT CHANGE UP (ref 135–145)
WBC # BLD: 8.83 K/UL — SIGNIFICANT CHANGE UP (ref 3.8–10.5)
WBC # BLD: 9.37 K/UL — SIGNIFICANT CHANGE UP (ref 3.8–10.5)
WBC # FLD AUTO: 8.83 K/UL — SIGNIFICANT CHANGE UP (ref 3.8–10.5)
WBC # FLD AUTO: 9.37 K/UL — SIGNIFICANT CHANGE UP (ref 3.8–10.5)

## 2017-08-25 PROCEDURE — 92933 PRQ TRLML C ATHRC ST ANGIOP1: CPT | Mod: GC,LM

## 2017-08-25 PROCEDURE — 93010 ELECTROCARDIOGRAM REPORT: CPT | Mod: 76

## 2017-08-25 RX ORDER — DEXTROSE 50 % IN WATER 50 %
1 SYRINGE (ML) INTRAVENOUS ONCE
Qty: 0 | Refills: 0 | Status: DISCONTINUED | OUTPATIENT
Start: 2017-08-25 | End: 2017-08-26

## 2017-08-25 RX ORDER — METOPROLOL TARTRATE 50 MG
1 TABLET ORAL
Qty: 0 | Refills: 0 | COMMUNITY
Start: 2017-08-25

## 2017-08-25 RX ORDER — DOCUSATE SODIUM 100 MG
100 CAPSULE ORAL
Qty: 0 | Refills: 0 | Status: DISCONTINUED | OUTPATIENT
Start: 2017-08-25 | End: 2017-08-26

## 2017-08-25 RX ORDER — ENOXAPARIN SODIUM 100 MG/ML
10 INJECTION SUBCUTANEOUS
Qty: 0 | Refills: 0 | COMMUNITY
Start: 2017-08-25

## 2017-08-25 RX ORDER — ASPIRIN/CALCIUM CARB/MAGNESIUM 324 MG
1 TABLET ORAL
Qty: 0 | Refills: 0 | COMMUNITY
Start: 2017-08-25

## 2017-08-25 RX ORDER — INSULIN GLARGINE 100 [IU]/ML
10 INJECTION, SOLUTION SUBCUTANEOUS
Qty: 0 | Refills: 0 | COMMUNITY
Start: 2017-08-25

## 2017-08-25 RX ORDER — DEXTROSE 50 % IN WATER 50 %
25 SYRINGE (ML) INTRAVENOUS ONCE
Qty: 0 | Refills: 0 | Status: DISCONTINUED | OUTPATIENT
Start: 2017-08-25 | End: 2017-08-26

## 2017-08-25 RX ORDER — METOPROLOL TARTRATE 50 MG
50 TABLET ORAL
Qty: 0 | Refills: 0 | Status: DISCONTINUED | OUTPATIENT
Start: 2017-08-25 | End: 2017-08-25

## 2017-08-25 RX ORDER — POLYETHYLENE GLYCOL 3350 17 G/17G
17 POWDER, FOR SOLUTION ORAL DAILY
Qty: 0 | Refills: 0 | Status: DISCONTINUED | OUTPATIENT
Start: 2017-08-25 | End: 2017-08-26

## 2017-08-25 RX ORDER — METOPROLOL TARTRATE 50 MG
25 TABLET ORAL
Qty: 0 | Refills: 0 | Status: DISCONTINUED | OUTPATIENT
Start: 2017-08-25 | End: 2017-08-26

## 2017-08-25 RX ORDER — HEPARIN SODIUM 5000 [USP'U]/ML
1200 INJECTION INTRAVENOUS; SUBCUTANEOUS
Qty: 25000 | Refills: 0 | Status: DISCONTINUED | OUTPATIENT
Start: 2017-08-25 | End: 2017-08-25

## 2017-08-25 RX ORDER — ATORVASTATIN CALCIUM 80 MG/1
40 TABLET, FILM COATED ORAL AT BEDTIME
Qty: 0 | Refills: 0 | Status: DISCONTINUED | OUTPATIENT
Start: 2017-08-25 | End: 2017-08-26

## 2017-08-25 RX ORDER — INSULIN LISPRO 100/ML
VIAL (ML) SUBCUTANEOUS
Qty: 0 | Refills: 0 | Status: DISCONTINUED | OUTPATIENT
Start: 2017-08-25 | End: 2017-08-26

## 2017-08-25 RX ORDER — ACETAMINOPHEN 500 MG
650 TABLET ORAL EVERY 6 HOURS
Qty: 0 | Refills: 0 | Status: DISCONTINUED | OUTPATIENT
Start: 2017-08-25 | End: 2017-08-26

## 2017-08-25 RX ORDER — GLUCAGON INJECTION, SOLUTION 0.5 MG/.1ML
1 INJECTION, SOLUTION SUBCUTANEOUS ONCE
Qty: 0 | Refills: 0 | Status: DISCONTINUED | OUTPATIENT
Start: 2017-08-25 | End: 2017-08-26

## 2017-08-25 RX ORDER — ACETAMINOPHEN 500 MG
2 TABLET ORAL
Qty: 0 | Refills: 0 | COMMUNITY
Start: 2017-08-25

## 2017-08-25 RX ORDER — INSULIN GLARGINE 100 [IU]/ML
5 INJECTION, SOLUTION SUBCUTANEOUS AT BEDTIME
Qty: 0 | Refills: 0 | Status: DISCONTINUED | OUTPATIENT
Start: 2017-08-25 | End: 2017-08-26

## 2017-08-25 RX ORDER — DOCUSATE SODIUM 100 MG
1 CAPSULE ORAL
Qty: 0 | Refills: 0 | COMMUNITY
Start: 2017-08-25

## 2017-08-25 RX ORDER — SODIUM CHLORIDE 9 MG/ML
1000 INJECTION, SOLUTION INTRAVENOUS
Qty: 0 | Refills: 0 | Status: DISCONTINUED | OUTPATIENT
Start: 2017-08-25 | End: 2017-08-26

## 2017-08-25 RX ORDER — HEPARIN SODIUM 5000 [USP'U]/ML
6000 INJECTION INTRAVENOUS; SUBCUTANEOUS EVERY 6 HOURS
Qty: 0 | Refills: 0 | Status: DISCONTINUED | OUTPATIENT
Start: 2017-08-25 | End: 2017-08-25

## 2017-08-25 RX ORDER — POLYETHYLENE GLYCOL 3350 17 G/17G
17 POWDER, FOR SOLUTION ORAL
Qty: 0 | Refills: 0 | COMMUNITY
Start: 2017-08-25

## 2017-08-25 RX ORDER — SENNA PLUS 8.6 MG/1
2 TABLET ORAL AT BEDTIME
Qty: 0 | Refills: 0 | Status: DISCONTINUED | OUTPATIENT
Start: 2017-08-25 | End: 2017-08-26

## 2017-08-25 RX ORDER — INSULIN LISPRO 100/ML
VIAL (ML) SUBCUTANEOUS AT BEDTIME
Qty: 0 | Refills: 0 | Status: DISCONTINUED | OUTPATIENT
Start: 2017-08-25 | End: 2017-08-26

## 2017-08-25 RX ORDER — ASPIRIN/CALCIUM CARB/MAGNESIUM 324 MG
81 TABLET ORAL DAILY
Qty: 0 | Refills: 0 | Status: DISCONTINUED | OUTPATIENT
Start: 2017-08-25 | End: 2017-08-26

## 2017-08-25 RX ORDER — SENNA PLUS 8.6 MG/1
2 TABLET ORAL
Qty: 0 | Refills: 0 | COMMUNITY
Start: 2017-08-25

## 2017-08-25 RX ORDER — CLOPIDOGREL BISULFATE 75 MG/1
75 TABLET, FILM COATED ORAL DAILY
Qty: 0 | Refills: 0 | Status: DISCONTINUED | OUTPATIENT
Start: 2017-08-26 | End: 2017-08-26

## 2017-08-25 RX ORDER — ATORVASTATIN CALCIUM 80 MG/1
1 TABLET, FILM COATED ORAL
Qty: 0 | Refills: 0 | COMMUNITY
Start: 2017-08-25

## 2017-08-25 RX ORDER — DEXTROSE 50 % IN WATER 50 %
12.5 SYRINGE (ML) INTRAVENOUS ONCE
Qty: 0 | Refills: 0 | Status: DISCONTINUED | OUTPATIENT
Start: 2017-08-25 | End: 2017-08-26

## 2017-08-25 RX ORDER — INSULIN LISPRO 100/ML
0 VIAL (ML) SUBCUTANEOUS
Qty: 0 | Refills: 0 | COMMUNITY
Start: 2017-08-25

## 2017-08-25 RX ADMIN — INSULIN GLARGINE 5 UNIT(S): 100 INJECTION, SOLUTION SUBCUTANEOUS at 22:18

## 2017-08-25 RX ADMIN — SODIUM CHLORIDE 3 MILLILITER(S): 9 INJECTION INTRAMUSCULAR; INTRAVENOUS; SUBCUTANEOUS at 05:07

## 2017-08-25 RX ADMIN — HEPARIN SODIUM 1500 UNIT(S)/HR: 5000 INJECTION INTRAVENOUS; SUBCUTANEOUS at 09:05

## 2017-08-25 RX ADMIN — HEPARIN SODIUM 1200 UNIT(S)/HR: 5000 INJECTION INTRAVENOUS; SUBCUTANEOUS at 02:39

## 2017-08-25 RX ADMIN — Medication 50 MILLIGRAM(S): at 05:07

## 2017-08-25 RX ADMIN — ATORVASTATIN CALCIUM 40 MILLIGRAM(S): 80 TABLET, FILM COATED ORAL at 21:25

## 2017-08-25 RX ADMIN — HEPARIN SODIUM 12 UNIT(S)/HR: 5000 INJECTION INTRAVENOUS; SUBCUTANEOUS at 02:17

## 2017-08-25 RX ADMIN — HEPARIN SODIUM 6000 UNIT(S): 5000 INJECTION INTRAVENOUS; SUBCUTANEOUS at 09:06

## 2017-08-25 RX ADMIN — Medication 81 MILLIGRAM(S): at 11:12

## 2017-08-25 NOTE — PROGRESS NOTE ADULT - SUBJECTIVE AND OBJECTIVE BOX
Subjective: No chest pain or sob       MEDICATIONS  (STANDING):  sodium chloride 0.9% lock flush 3 milliLiter(s) IV Push every 8 hours  insulin lispro (HumaLOG) corrective regimen sliding scale   SubCutaneous three times a day before meals  dextrose 5%. 1000 milliLiter(s) (50 mL/Hr) IV Continuous <Continuous>  dextrose 50% Injectable 12.5 Gram(s) IV Push once  dextrose 50% Injectable 25 Gram(s) IV Push once  dextrose 50% Injectable 25 Gram(s) IV Push once  aspirin enteric coated 81 milliGRAM(s) Oral daily  insulin glargine Injectable (LANTUS) 10 Unit(s) SubCutaneous at bedtime  atorvastatin 40 milliGRAM(s) Oral at bedtime  metoprolol 50 milliGRAM(s) Oral two times a day  senna 2 Tablet(s) Oral at bedtime  docusate sodium 100 milliGRAM(s) Oral two times a day  heparin  Infusion. 1200 Unit(s)/Hr (12 mL/Hr) IV Continuous <Continuous>    MEDICATIONS  (PRN):  dextrose Gel 1 Dose(s) Oral once PRN Blood Glucose LESS THAN 70 milliGRAM(s)/deciliter  glucagon  Injectable 1 milliGRAM(s) IntraMuscular once PRN Glucose LESS THAN 70 milligrams/deciliter  acetaminophen   Tablet. 650 milliGRAM(s) Oral every 6 hours PRN Moderate Pain (4 - 6)  polyethylene glycol 3350 17 Gram(s) Oral daily PRN Constipation  heparin  Injectable 6000 Unit(s) IV Push every 6 hours PRN For aPTT less than 40      LABS:                        11.9   9.37  )-----------( 199      ( 25 Aug 2017 07:30 )             35.7     Hemoglobin: 11.9 g/dL (08-25 @ 07:30)  Hemoglobin: 11.8 g/dL (08-25 @ 01:30)  Hemoglobin: 12.1 g/dL (08-24 @ 06:17)  Hemoglobin: 12.0 g/dL (08-23 @ 07:00)  Hemoglobin: 12.7 g/dL (08-22 @ 06:55)    08-25    142  |  103  |  24<H>  ----------------------------<  122<H>  4.2   |  23  |  1.18    Ca    9.3      25 Aug 2017 07:30  Mg     2.0     08-25      Creatinine Trend: 1.18<--, 1.14<--, 1.19<--, 1.23<--, 1.17<--, 1.16<--   PTT - ( 25 Aug 2017 06:50 )  PTT:30.6 SEC        PHYSICAL EXAM  Vital Signs Last 24 Hrs  T(C): 36.7 (25 Aug 2017 05:03), Max: 36.9 (24 Aug 2017 14:42)  T(F): 98.1 (25 Aug 2017 05:03), Max: 98.4 (24 Aug 2017 14:42)  HR: 57 (25 Aug 2017 05:03) (53 - 59)  BP: 153/75 (25 Aug 2017 05:03) (125/64 - 153/75)  BP(mean): --  RR: 17 (25 Aug 2017 05:03) (17 - 19)  SpO2: 96% (25 Aug 2017 05:03) (96% - 100%)      Cardiovascular: Normal S1 S2,RRR  No JVD, No murmurs , Peripheral pulses palpable 2+ bilaterally  Respiratory: Lungs clear to auscultation, normal effort 	  Gastrointestinal:  Soft, Non-tender, + BS	  Extremities no clubbing, cyanosis or edema B/L LE's stable/ improved  R groin w/ noted  hematoma and ecchymosis     TELEMETRY: SR      < from: Transthoracic Echocardiogram (08.12.17 @ 07:13) >  CONCLUSIONS:  1. Normal mitral valve. Mild mitral regurgitation.  2. Calcified trileaflet aortic valve with normal opening.  3. Aortic Root: 4.0 cm.  4. Moderate left atrial enlargement.  5. Moderate concentric left ventricular hypertrophy.  6. Moderate global left ventricular dysfunction  (EF=40-45%).  7. Grade III diastolic dysfunction  8. Normal right atrium.  9. Normal right ventricular size and function.  10. RV systolic pressure is moderately increased (PASP 48mm  Hg).  11. There is mild tricuspid regurgitation.  12. There is mild pulmonic regurgitation.  13. Normal pericardium with no pericardial effusion.    ------------------------------------------------------------------------  Confirmed on  8/13/2017 - 08:29:04 by Paula Martinez MD    < end of copied text >    < from: Nuclear Stress Test-Pharmacologic (08.14.17 @ 09:57) >  IMPRESSIONS:  * Negative ECG evidence of ischemia after IV  administration of Regadenoson.  * Myocardial Perfusion SPECT results are abnormal.  * There is a medium sized, mild to moderate defect in the  inferior wall that is partially reversible, suggestive of  infarction with moderate steffi-infarct ischemia.  There is  a small, mild defect in the anterior wall that is  reversible, suggestive of ischemia.  There is a small,  mild to moderate defect in the lateral wall that is  partially reversible, suggestive of infarction with  mild-moderate steffi-infarct ischemia.  * Post-stress resting myocardial perfusion gated SPECT  imaging was performed (LVEF = 51 %).    Confirmed on  8/14/2017 - 12:44:15 at [place] by Guru Joaquin M.D.    < end of copied text >    < from: Cardiac Cath Lab - Adult (08.15.17 @ 14:58) >  VENTRICLES: No left ventriculogram was performed.  CORONARY VESSELS: The coronary circulation is left dominant.  LM:   --  Distal left main: There was a discrete 95 % stenosis.  LAD:   --  Mid LAD: There was a 100 % stenosis with the distal vessel being  filled via the LIMA-LAD. This lesion is a chronic total occlusion.  CX:   --  Ostial circumflex: There was a discrete 95 % stenosis.  --  Proximal circumflex: The vessel was calcified. There was a diffuse 30 %  stenosis.  --  Mid circumflex: Angiography showed mild atherosclerosis with no flow  limiting lesions.  --  Distalcircumflex: Angiography showed mild atherosclerosis with no flow  limiting lesions.  --  OM1: Angiography showed minor luminal irregularities with no flow  limiting lesions.  --  OM2: Angiography showed minor luminal irregularities with no flow  limiting lesions.  RI:   --  Ramus intermedius: There was a tubular 70 % stenosis.  RCA:   --  RCA: The vessel was small sized. Angiography showed mild  atherosclerosis with no flow limiting lesions.  GRAFTS:   --  Graft to the LAD: The graft was a LIMA. Graft angiography  showed minor luminal irregularities. Distal vessel angiography showed mild  diffuse disease.  --  Graft to the ramus intermedius: The graft was a saphenous vein graft.  There was a 100 % stenosis at the graft ostium.  --  Graft to the 1st obtuse marginal: The graft was a saphenous vein graft.  There was a 100 % stenosis at the graft ostium.  AORTA: Ascending aorta: Normal. No additional grafts visualized in  aortogram.  COMPLICATIONS: There were no complications.  DIAGNOSTIC RECOMMENDATIONS: Coronary angiogram demonstrates patent LIMA-LAD  with closed SVG-RI and closed SVG-OM. In addition there is severe  atherosclerosis in the distal LM/ostial Cx/RI that is the culprit of the  patient's anginal symptoms. Will therefore perform staged PCI to the  protected LM/Cx/RI in near future when renally optimized/cr stable.  Prepared and signed by  Temitope Nuñez M.D.  Signed 08/16/2017 19:16:51    < end of copied text >    < from: US Duplex Arterial Lower Ext Ltd, Right (08.18.17 @ 10:02) >    Impression:    No pseudoaneurysm.    RITA BENNETT M.D., ATTENDING RADIOLOGIST  This documenthas been electronically signed. Aug 18 2017 10:45AM    < end of copied text >      ASSESSMENT/PLAN: 	83y Male with h/o CAD s/p CABG, AF, HTN, HLD admitted with exertional angina, abnormal NST, EF 40-45%, Cath with severe distal LM and ostial Lcx disease.  Post cath Groin Hematoma.  US negative for pseudoaneurysm.      --For planned PCI to protected LM   --plan to transfer to [facility]   --Monitor H/H  --Groin site stable, hematoma resolving  --eventually resume AC for PAFib

## 2017-08-25 NOTE — DISCHARGE NOTE ADULT - CARE PLAN
Principal Discharge DX:	CAD (coronary artery disease)  Goal:	Symptom resolution, medication compliance, prevent CAD progression,  Instructions for follow-up, activity and diet:	transfer to [facility] for PCI  Secondary Diagnosis:	Atrial fibrillation  Goal:	rate control, prevent CVA / TIA, medication compliance, risk factor control  Instructions for follow-up, activity and diet:	continue Heparin gtt  Secondary Diagnosis:	Groin hematoma, initial encounter  Goal:	Symptom resolution, medication compliance, prevent disease progression  Secondary Diagnosis:	HTN (hypertension)  Goal:	Monitor BP, medication compliance, prevent end organ damage  Secondary Diagnosis:	Type 2 diabetes mellitus without complication, unspecified long term insulin use status Principal Discharge DX:	CAD (coronary artery disease)  Goal:	Symptom resolution, medication compliance, prevent CAD progression.  Instructions for follow-up, activity and diet:	transfer to [facility] for PCI.   Continue ASA, Lipitor, current medications.  Secondary Diagnosis:	Atrial fibrillation  Goal:	rate control, prevent CVA / TIA, medication compliance, risk factor control  Instructions for follow-up, activity and diet:	continue Heparin gtt.  Monitor Right groin.  Secondary Diagnosis:	Groin hematoma, initial encounter  Goal:	Symptom resolution, medication compliance, prevent disease progression  Instructions for follow-up, activity and diet:	Monitor Right groin.  Secondary Diagnosis:	HTN (hypertension)  Goal:	Monitor BP, medication compliance, prevent end organ damage.  Instructions for follow-up, activity and diet:	Continue Metoprolol.   Monitor your blood pressure.  Secondary Diagnosis:	Type 2 diabetes mellitus without complication, unspecified long term insulin use status  Goal:	Reduce blood glucose.  Instructions for follow-up, activity and diet:	Continue Lantus, Insulin sliding scale.   Monitor your fingersticks. Principal Discharge DX:	CAD (coronary artery disease)  Goal:	Symptom resolution, medication compliance, prevent CAD progression.  Instructions for follow-up, activity and diet:	transfer to [facility] for PCI.   Continue ASA, Lipitor, current medications.  Secondary Diagnosis:	Atrial fibrillation  Goal:	rate control, prevent CVA / TIA, medication compliance, risk factor control  Instructions for follow-up, activity and diet:	continue Heparin gtt.  Monitor Right groin.  Secondary Diagnosis:	Groin hematoma, initial encounter  Goal:	Symptom resolution, medication compliance, prevent disease progression  Instructions for follow-up, activity and diet:	Monitor Right groin.  Secondary Diagnosis:	HTN (hypertension)  Goal:	Monitor BP, medication compliance, prevent end organ damage.  Instructions for follow-up, activity and diet:	Continue Metoprolol.   Monitor your blood pressure.  Secondary Diagnosis:	Type 2 diabetes mellitus without complication, unspecified long term insulin use status  Goal:	Reduce blood glucose.  Instructions for follow-up, activity and diet:	Continue Lantus, Insulin sliding scale.   Monitor your fingersticks. Principal Discharge DX:	CAD (coronary artery disease)  Goal:	Symptom resolution, medication compliance, prevent CAD progression.  Instructions for follow-up, activity and diet:	transfer to [facility] for PCI.   Continue ASA, Lipitor, current medications.  Secondary Diagnosis:	Atrial fibrillation  Goal:	rate control, prevent CVA / TIA, medication compliance, risk factor control  Instructions for follow-up, activity and diet:	continue Heparin gtt.  Monitor Right groin.  Secondary Diagnosis:	Groin hematoma, initial encounter  Goal:	Symptom resolution, medication compliance, prevent disease progression  Instructions for follow-up, activity and diet:	Monitor Right groin.  Secondary Diagnosis:	HTN (hypertension)  Goal:	Monitor BP, medication compliance, prevent end organ damage.  Instructions for follow-up, activity and diet:	Continue Metoprolol.   Monitor your blood pressure.  Secondary Diagnosis:	Type 2 diabetes mellitus without complication, unspecified long term insulin use status  Goal:	Reduce blood glucose.  Instructions for follow-up, activity and diet:	Continue Lantus, Insulin sliding scale.   Monitor your fingersticks.

## 2017-08-25 NOTE — H&P CARDIOLOGY - HISTORY OF PRESENT ILLNESS
82 year old Male from home lives alone, ambulates independently, with PMH Diabetes, CAD S/p stent? CABG? in Nov 2011, Obesity, OA, macular degeneration came with c/o SOB on exertion since one month ago. He reports that He become SOB walking one block,  His exertional SOB was progressively getting worse since one week, so he called his physician who told him to go to ER. Dyspnea is aggravated by lifting grocery bags and walking more then a block, relieved by sitting down. He denies Orthopnea, PND, chest pain, dizziness, recent URI, heartburn, fever. He also c/o chronic B/L leg swelling and knee pain due to Osteoarthritis. He reported that he goes to his cardiologist Dr. Rose olivera in [facility] who scheduled him for Nuclear Stress test but he cancelled that test as it was expensive. He did not know whether his cardiologist has done an Echo and EF. In ED, EKG was done which shows NS-T wave changes with occasional PVC. CXR was done "Focal opacity in the periphery of the left lower lobe along the lateral aspect of the left hemidiaphragm not well visualized on the lateral image which could represent focal scarring/atelectasis. Otherwise, clear lungs."  s/p cardiac cath 8/15/17 .staged PCI to the protected LM/Cx/RI in near future when renally optimized/cr stable. 82 year old Male from home lives alone, ambulates independently, with PMH Afib not on AC, Diabetes, CAD S/p stent? CABG? in Nov 2011, Obesity, OA, macular degeneration came with c/o SOB on exertion since one month ago. He reports that He become SOB walking one block,  His exertional SOB was progressively getting worse since one week, so he called his physician who told him to go to ER. Dyspnea is aggravated by lifting grocery bags and walking more then a block, relieved by sitting down. He denies Orthopnea, PND, chest pain, dizziness, recent URI, heartburn, fever. He also c/o chronic B/L leg swelling and knee pain due to Osteoarthritis. He reported that he goes to his cardiologist Dr. Rose olivera in [facility] who scheduled him for Nuclear Stress test but he cancelled that test as it was expensive. He did not know whether his cardiologist has done an Echo and EF. In ED, EKG was done which shows NS-T wave changes with occasional PVC. CXR was done "Focal opacity in the periphery of the left lower lobe along the lateral aspect of the left hemidiaphragm not well visualized on the lateral image which could represent focal scarring/atelectasis. Otherwise, clear lungs."  s/p cardiac cath 8/15/17 .staged PCI to the protected LM/Cx/RI in near future when renally optimized/cr stable.

## 2017-08-25 NOTE — DISCHARGE NOTE ADULT - PATIENT PORTAL LINK FT
“You can access the Follow[employer] Patient Portal, offered by [facility], by registering with the following website: [email or web address]”

## 2017-08-25 NOTE — H&P CARDIOLOGY - PMH
Atrial fibrillation    CAD (coronary artery disease)    DM (diabetes mellitus)    Dyslipidemia    History of Obesity    HTN (hypertension) Atrial fibrillation    CAD (coronary artery disease)    DM (diabetes mellitus)    Dyslipidemia    History of Obesity    HTN (hypertension)    Macular degeneration    OA (osteoarthritis)

## 2017-08-25 NOTE — H&P CARDIOLOGY - PSH
H/O Moh's micrographic surgery for skin cancer    History of cataract surgery    S/P CABG x 4  2011 at [facility]

## 2017-08-25 NOTE — DISCHARGE NOTE ADULT - SECONDARY DIAGNOSIS.
Atrial fibrillation Groin hematoma, initial encounter HTN (hypertension) Type 2 diabetes mellitus without complication, unspecified long term insulin use status

## 2017-08-25 NOTE — DISCHARGE NOTE ADULT - OTHER SIGNIFICANT FINDINGS
(Admit Diagnosis) Other nonspecific abnormal cardiovascular system function study  (PMH) Atrial fibrillation  (PMH) CAD (coronary artery disease)  (PMH) HTN (hypertension)  (PMH) DM (diabetes mellitus)  (PMH) Dyslipidemia  (PMH) History of Obesity  (Problem/DX) HTN (hypertension)  (Problem/DX) Groin hematoma, initial encounter  (Problem/DX) Type 2 diabetes mellitus without complication, unspecified long term insulin use status  (Problem/DX) Paroxysmal atrial fibrillation  (Problem/DX) Coronary artery disease with stable angina pectoris, unspecified vessel or lesion type, unspecified whether native or transplanted heart  (Problem/DX) Atrial fibrillation  (Problem/DX) CAD (coronary artery disease)  (PSH) H/O Moh's micrographic surgery for skin cancer  (PSH) History of cataract surgery  (PSH) S/P CABG x 4  (PSH) No Past Surgical History (PMH) Atrial fibrillation  (PMH) CAD (coronary artery disease)  (PMH) HTN (hypertension)  (PMH) DM (diabetes mellitus)  (PMH) Dyslipidemia  (PMH) History of Obesity  (PSH) H/O Moh's micrographic surgery for skin cancer  (PSH) History of cataract surgery  (PSH) S/P CABG x 4  (PSH) No Past Surgical History

## 2017-08-25 NOTE — PROGRESS NOTE ADULT - ASSESSMENT
The patient is a natanael 82-year-old gentleman with past medical history of diabetes, hypertension, and coronary artery disease, status post coronary artery bypass graft, who presents with positive stress test, new decrease in ejection fraction, and new onset atrial fibrillation.    CKD stage 3    1.	Renal.  No renal objections to repeat angiogram from renal perspective.    No need for Mucomyst at present.  2.	Cardiology.  Heart rate control per Cardiology.  Losartan was DC as was the HCTZ.  No pseudoaneurysm noted in right grion;  Cath today but at NS  3.	Heme- Heparin gtt at present

## 2017-08-25 NOTE — PROGRESS NOTE ADULT - SUBJECTIVE AND OBJECTIVE BOX
NEPHROLOGY-[facility] [phone]        Patient seen and examined in bed.  He is awaiting xfer to [facility].  On heparin gtt        MEDICATIONS  (STANDING):  sodium chloride 0.9% lock flush 3 milliLiter(s) IV Push every 8 hours  insulin lispro (HumaLOG) corrective regimen sliding scale   SubCutaneous three times a day before meals  dextrose 5%. 1000 milliLiter(s) (50 mL/Hr) IV Continuous <Continuous>  dextrose 50% Injectable 12.5 Gram(s) IV Push once  dextrose 50% Injectable 25 Gram(s) IV Push once  dextrose 50% Injectable 25 Gram(s) IV Push once  aspirin enteric coated 81 milliGRAM(s) Oral daily  insulin glargine Injectable (LANTUS) 10 Unit(s) SubCutaneous at bedtime  atorvastatin 40 milliGRAM(s) Oral at bedtime  metoprolol 50 milliGRAM(s) Oral two times a day  senna 2 Tablet(s) Oral at bedtime  docusate sodium 100 milliGRAM(s) Oral two times a day  heparin  Infusion. 1200 Unit(s)/Hr (12 mL/Hr) IV Continuous <Continuous>      VITAL:  T(C): , Max: 36.9 (08-24-17 @ 14:42)  T(F): , Max: 98.4 (08-24-17 @ 14:42)  HR: 57 (08-25-17 @ 05:03)  BP: 153/75 (08-25-17 @ 05:03)  BP(mean): --  RR: 17 (08-25-17 @ 05:03)  SpO2: 96% (08-25-17 @ 05:03)  Wt(kg): --    I and O's:        PHYSICAL EXAM:    Constitutional: NAD  HEENT: PERRLA    Neck:  No JVD  Respiratory: CTAB/L  Cardiovascular: S1 and S2  Gastrointestinal: BS+, soft, NT/ND  Extremities: No peripheral edema  Neurological: A/O x 3, no focal deficits  Psychiatric: Normal mood, normal affect  : No Rosenbaum  Skin: No rashes  Access: Not applicable    LABS:                        11.9   9.37  )-----------( 199      ( 25 Aug 2017 07:30 )             35.7     08-25    142  |  103  |  24<H>  ----------------------------<  122<H>  4.2   |  23  |  1.18    Ca    9.3      25 Aug 2017 07:30  Mg     2.0     08-25            Urine Studies:          RADIOLOGY & ADDITIONAL STUDIES:

## 2017-08-25 NOTE — DISCHARGE NOTE ADULT - PLAN OF CARE
Symptom resolution, medication compliance, prevent CAD progression, transfer to [facility] for PCI rate control, prevent CVA / TIA, medication compliance, risk factor control continue Heparin gtt Symptom resolution, medication compliance, prevent disease progression Monitor BP, medication compliance, prevent end organ damage Monitor Right groin. Continue Metoprolol.   Monitor your blood pressure. Reduce blood glucose. Continue Lantus, Insulin sliding scale.   Monitor your fingersticks. Symptom resolution, medication compliance, prevent CAD progression. transfer to [facility] for PCI.   Continue ASA, Lipitor, current medications. continue Heparin gtt.  Monitor Right groin. Monitor BP, medication compliance, prevent end organ damage.

## 2017-08-25 NOTE — DISCHARGE NOTE ADULT - MEDICATION SUMMARY - MEDICATIONS TO TAKE
I will START or STAY ON the medications listed below when I get home from the hospital:    aspirin 81 mg oral delayed release tablet  -- 1 tab(s) by mouth once a day  -- Indication: For CAD (coronary artery disease)    acetaminophen 325 mg oral tablet  -- 2 tab(s) by mouth every 6 hours, As needed, Moderate Pain (4 - 6)  -- Indication: For Pain    insulin glargine  -- 10 unit(s) subcutaneous once a day (at bedtime)  -- Indication: For Diabetes     insulin lispro 100 units/mL subcutaneous solution  --  subcutaneous 3 times a day (before meals); 1 Unit(s) if Glucose 151 - 200  2 Unit(s) if Glucose 201 - 250  3 Unit(s) if Glucose 251 - 300  4 Unit(s) if Glucose 301 - 350  5 Unit(s) if Glucose 351 - 400  6 Unit(s) if Glucose Greater Than 400  -- Indication: For Diabetes     atorvastatin 40 mg oral tablet  -- 1 tab(s) by mouth once a day (at bedtime)  -- Indication: For Hyperlipidemia     metoprolol tartrate 50 mg oral tablet  -- 1 tab(s) by mouth 2 times a day  -- Indication: For A-fib     senna oral tablet  -- 2 tab(s) by mouth once a day (at bedtime)  -- Indication: For laxative     polyethylene glycol 3350 oral powder for reconstitution  -- 17 gram(s) by mouth once a day, As needed, Constipation  -- Indication: For laxative     docusate sodium 100 mg oral capsule  -- 1 cap(s) by mouth 2 times a day  -- Indication: For laxative

## 2017-08-25 NOTE — DISCHARGE NOTE ADULT - MEDICATION SUMMARY - MEDICATIONS TO STOP TAKING
I will STOP taking the medications listed below when I get home from the hospital:    losartan 50 mg oral tablet  -- 1 tab(s) by mouth once a day    isosorbide mononitrate 30 mg oral tablet, extended release  -- 1 tab(s) by mouth once a day    hydroCHLOROthiazide 25 mg oral tablet  -- 1 tab(s) by mouth once a day

## 2017-08-25 NOTE — PROGRESS NOTE ADULT - ATTENDING COMMENTS
Agree with above.   -PCI to protected LM when groin stable    Temitope Nuñez MD  [facility]  [address], [address]  [place] [address]  office: [phone]  pager: [phone]
Agree with above.  F/u vascular    Temitope Nuñez MD  [facility]  [address], [address]  [place] [address]  office: [phone]  pager: [phone]
Patient seen and examined, agree with above assessment and plan as transcribed above.    - awaiting PCI    Sandeep Ruiz MD, [facility]  [facility], [facility]  [address] [place] [address]  PHONE:  [phone]  BEEPER : [phone]
Patient seen and examined.  Agree with above.   -Appreciate vascular follow up  -Will restart heparin with no bolus - groin soft with no tenderness  -WIll plan for transfer to [facility] tomorrow for High risk PCI +- CSI    Temitope Nuñez MD  [facility]  [address], [address]  [place] [address]  office: [phone]  pager: [phone]
Patient seen and examined.  Agree with above.   -Transfer to [facility] today for high risk PCI    Temitope Nuñez MD  [facility]  [address], [address]  [place] [address]  office: [phone]  pager: [phone]
Patient seen and examined.  Agree with above.  -Plan for PCI this week if groin stable  -f/u vascular  -ac on hold for hematoma    Temitope Nuñez MD  [facility]  [address], [address]  [place] [address]  office: [phone]  pager: [phone]
Patient seen and examined.  Agree with above.   -Pt. with spontaneous groin hematoma today  -Hold hep  -No pseudoaneurysm on ultrasound  -monitor groin and h/h  -will plan for LM pci when groin stable    Temitope Nuñez MD  [facility]  [address] [place] [address], [address]  [place] [address]  office: [phone]  pager: [phone]
Patient seen and examined.  Agree with above.   -Staged PCI when groin stable and ok to restart ac and apt    Temitope Nuñez MD  [facility]  [address], [address]  [place] [address]  office: [phone]  pager: [phone]

## 2017-08-25 NOTE — DISCHARGE NOTE ADULT - CARE PROVIDER_API CALL
Temitope Nuñez), Cardiovascular Disease; Internal Medicine; Interventional Cardiology  [address]  [place] [address]  Phone: [phone]  Fax: [phone]    Brooklynn Nettles), Internal Medicine; Nephrology  [address]  [place] [address]  Phone: [phone]  Fax: [phone]

## 2017-08-25 NOTE — DISCHARGE NOTE ADULT - INSTRUCTIONS
1800 calorie diabetic diet/ low salt, low fat , low cholesterol 1800 calorie diabetic diet/ low salt, low fat, low cholesterol.

## 2017-08-25 NOTE — H&P CARDIOLOGY - PATIENT REFERRED TO
Cardiac catherization with possible intervention Cardiac catherization with possible intervention/Other (specify)

## 2017-08-25 NOTE — DISCHARGE NOTE ADULT - HOSPITAL COURSE
83 y/o male with a PMHx of CAD S/P CABG, AF, HTN, HLD, and DM presented to [facility] ED 8/11/17 complaining of shortness of breath, found to have a positive NST, transferred to [facility] for Cardiac Cath / PCI     8/15 Cardiac cath: dLM 90, , ostial LCx 95; SVG to OM closed, SVG to RCA closed, LIMA to LAD patent; RFA sheath removed.     8/16 EP consult (Chetan): Given his elevated chadsvasc score, recommend lifelong AC with whatever NOAC is covered by his insurance. Pt converted back to SR on metoprolol.  8/16 Renal consult: No renal objections to repeat angiogram in AM. No evidence of ANNETTA at present. No need for Mucomyst at present. Cardiology. Heart rate control per Cardiology.  Losartan was DC as was the HCTZ. Endocrine. Awaiting TSH with next blood draw.  8/16 Cards note: Cath with severe distal LM, ostial Cx disease. Plan for PCI to protected LM tomorrow. NPO past midnight for cath tomorrow. Contiue Heparin gtt.    8/18 Vascular consult: Right groin hematoma S/P cardiac cath on 8/15/17. Pt has been on heparin gtt, which was just discontinued this morning. There is a possibility of pseudoaneurysm. The hematoma does not seem to be rapidly expanding, although the nurse reports it has enlarged over the past 10-12h. VA duplex arterial RLE r/o pseudoaneurysm. Hold Heparin. Compression with saline bag. Pain control as needed.  8/18 Cards note: Stress test positive as noted above. Cath with severe distal LM, ostial Cx disease. Plan for eventual  PCI to protected LM. S/P groin US. No pseudoaneurysm was identified. Surgery appreciated. Pressure dressing applied. Monitor H/H and groin.   8/18 Renal note: No evidence of ANNETTA at present. Stable renal function. Cardiology. Heart rate control per cardiology. Losartan was D/C as was the HCTZ. Heparin gtt is off at present due to hematoma. Cath timing per cards.  8/18 Right femoral artery US: No pseudoaneurysm.  8/18 Vascular note: Ultrasound reviewed. No evidence of pseudoaneurysm. No vascular surgery interventions.    8/19 Cards note: Stress test positive as noted above. Cath with severe distal LM, ostial Cx disease. Plan for eventual  PCI to protected LM. S/P groin US. No pseudoaneurysm was identified. Surgery appreciated. Pressure dressing applied. Monitor H/H and groin.    8/20 Cards note: Stress test positive as noted above. Cath with severe distal LM, ostial Cx disease. Plan for PCI this week if groin stable. S/P groin US. No pseudoaneurysm was identified. Surgery appreciated. Pressure dressing applied. Monitor H/H and groin.  8/20 Renal note: CAD S/P cath. Awaiting PCI. No objection for PIC. Groin hematoma. H/H stable. No pseudoaneurysm. Improving. BP acceptable. Continue Metoprolol.  8/22 Cards: vasc f/u, groin stable, PCI to LM this week  8/22 Vasc: reconsult as needed, hematoma soft  8/23 Nephro: Cath timing per cardiology.  8/23 cards: Cath some time this week.       8/24 Cardio: Patient seen and examined.  Agree with above.   -Appreciate vascular follow up  -Will restart heparin with no bolus - groin soft with no tenderness  -WIll plan for transfer to [facility] tomorrow for High risk PCI +- CSI    8/24 1.	Renal.  No renal objections to repeat angiogram from renal perspective.  No evidence of ANNETTA at present.  No need for Mucomyst at present.  2.	Cardiology.  Heart rate control per Cardiology.  Losartan was DC as was the HCTZ.  No pseudoaneurysm noted in right grion;  Cath today  3.	Endocrine.  TSH was within normal limits. 82 y/o male with a PMHx of CAD S/P CABG, AF, HTN, HLD, and DM presented to [facility] ED 8/11/17 complaining of shortness of breath, found to have a positive NST, transferred to [facility] for Cardiac Cath / PCI.    8/15 Cardiac cath: dLM 90, , ostial LCx 95; SVG to OM closed, SVG to RCA closed, LIMA to LAD patent; RFA sheath removed.     8/16 EP consult (Chetan): Given his elevated chadsvasc score, recommend lifelong AC with whatever NOAC is covered by his insurance. Pt converted back to SR on metoprolol.  8/16 Renal consult: No renal objections to repeat angiogram in AM. No evidence of ANNETTA at present. No need for Mucomyst at present. Cardiology. Heart rate control per Cardiology.  Losartan was DC as was the HCTZ. Endocrine. Awaiting TSH with next blood draw.  8/16 Cards note: Cath with severe distal LM, ostial Cx disease. Plan for PCI to protected LM tomorrow. NPO past midnight for cath tomorrow. Contiue Heparin gtt.    8/18 Vascular consult: Right groin hematoma S/P cardiac cath on 8/15/17. Pt has been on heparin gtt, which was just discontinued this morning. There is a possibility of pseudoaneurysm. The hematoma does not seem to be rapidly expanding, although the nurse reports it has enlarged over the past 10-12h. VA duplex arterial RLE r/o pseudoaneurysm. Hold Heparin. Compression with saline bag. Pain control as needed.  8/18 Cards note: Stress test positive as noted above. Cath with severe distal LM, ostial Cx disease. Plan for eventual  PCI to protected LM. S/P groin US. No pseudoaneurysm was identified. Surgery appreciated. Pressure dressing applied. Monitor H/H and groin.   8/18 Renal note: No evidence of ANNETTA at present. Stable renal function. Cardiology. Heart rate control per cardiology. Losartan was D/C as was the HCTZ. Heparin gtt is off at present due to hematoma. Cath timing per cards.  8/18 Right femoral artery US: No pseudoaneurysm.  8/18 Vascular note: Ultrasound reviewed. No evidence of pseudoaneurysm. No vascular surgery interventions.    8/19 Cards note: Stress test positive as noted above. Cath with severe distal LM, ostial Cx disease. Plan for eventual  PCI to protected LM. S/P groin US. No pseudoaneurysm was identified. Surgery appreciated. Pressure dressing applied. Monitor H/H and groin.    8/20 Cards note: Stress test positive as noted above. Cath with severe distal LM, ostial Cx disease. Plan for PCI this week if groin stable. S/P groin US. No pseudoaneurysm was identified. Surgery appreciated. Pressure dressing applied. Monitor H/H and groin.  8/20 Renal note: CAD S/P cath. Awaiting PCI. No objection for PIC. Groin hematoma. H/H stable. No pseudoaneurysm. Improving. BP acceptable. Continue Metoprolol.  8/22 Cards: vasc f/u, groin stable, PCI to LM this week  8/22 Vasc: reconsult as needed, hematoma soft  8/23 Nephro: Cath timing per cardiology.  8/23 cards: Cath some time this week.       8/24 Cardio: Patient seen and examined.  Agree with above.   -Appreciate vascular follow up  -Will restart heparin with no bolus - groin soft with no tenderness  -WIll plan for transfer to [facility] tomorrow for High risk PCI +- CSI    8/24 1.	Renal.  No renal objections to repeat angiogram from renal perspective.  No evidence of ANNETTA at present.  No need for Mucomyst at present.  2.	Cardiology.  Heart rate control per Cardiology.  Losartan was DC as was the HCTZ.  No pseudoaneurysm noted in right grion;  Cath today  3.	Endocrine.  TSH was within normal limits.    8/25/17 Pt is medically stable for transfer today to [facility] for PTCI as per Dr. Nuñez.

## 2017-08-26 VITALS
RESPIRATION RATE: 18 BRPM | OXYGEN SATURATION: 98 % | SYSTOLIC BLOOD PRESSURE: 126 MMHG | TEMPERATURE: 98 F | HEART RATE: 58 BPM | DIASTOLIC BLOOD PRESSURE: 62 MMHG

## 2017-08-26 DIAGNOSIS — E78.5 HYPERLIPIDEMIA, UNSPECIFIED: ICD-10-CM

## 2017-08-26 DIAGNOSIS — I25.118 ATHEROSCLEROTIC HEART DISEASE OF NATIVE CORONARY ARTERY WITH OTHER FORMS OF ANGINA PECTORIS: ICD-10-CM

## 2017-08-26 DIAGNOSIS — I10 ESSENTIAL (PRIMARY) HYPERTENSION: ICD-10-CM

## 2017-08-26 LAB
ANION GAP SERPL CALC-SCNC: 15 MMOL/L — SIGNIFICANT CHANGE UP (ref 5–17)
BUN SERPL-MCNC: 19 MG/DL — SIGNIFICANT CHANGE UP (ref 7–23)
CALCIUM SERPL-MCNC: 9.5 MG/DL — SIGNIFICANT CHANGE UP (ref 8.4–10.5)
CHLORIDE SERPL-SCNC: 103 MMOL/L — SIGNIFICANT CHANGE UP (ref 96–108)
CO2 SERPL-SCNC: 21 MMOL/L — LOW (ref 22–31)
CREAT SERPL-MCNC: 1.12 MG/DL — SIGNIFICANT CHANGE UP (ref 0.5–1.3)
GLUCOSE SERPL-MCNC: 109 MG/DL — HIGH (ref 70–99)
HCT VFR BLD CALC: 38.4 % — LOW (ref 39–50)
HGB BLD-MCNC: 13.1 G/DL — SIGNIFICANT CHANGE UP (ref 13–17)
MCHC RBC-ENTMCNC: 31.4 PG — SIGNIFICANT CHANGE UP (ref 27–34)
MCHC RBC-ENTMCNC: 34 GM/DL — SIGNIFICANT CHANGE UP (ref 32–36)
MCV RBC AUTO: 92.2 FL — SIGNIFICANT CHANGE UP (ref 80–100)
PLATELET # BLD AUTO: 197 K/UL — SIGNIFICANT CHANGE UP (ref 150–400)
POTASSIUM SERPL-MCNC: 5 MMOL/L — SIGNIFICANT CHANGE UP (ref 3.5–5.3)
POTASSIUM SERPL-SCNC: 5 MMOL/L — SIGNIFICANT CHANGE UP (ref 3.5–5.3)
RBC # BLD: 4.17 M/UL — LOW (ref 4.2–5.8)
RBC # FLD: 12.9 % — SIGNIFICANT CHANGE UP (ref 10.3–14.5)
SODIUM SERPL-SCNC: 139 MMOL/L — SIGNIFICANT CHANGE UP (ref 135–145)
WBC # BLD: 10.1 K/UL — SIGNIFICANT CHANGE UP (ref 3.8–10.5)
WBC # FLD AUTO: 10.1 K/UL — SIGNIFICANT CHANGE UP (ref 3.8–10.5)

## 2017-08-26 PROCEDURE — 93010 ELECTROCARDIOGRAM REPORT: CPT

## 2017-08-26 PROCEDURE — C1769: CPT

## 2017-08-26 PROCEDURE — C1894: CPT

## 2017-08-26 PROCEDURE — 93005 ELECTROCARDIOGRAM TRACING: CPT

## 2017-08-26 PROCEDURE — C1874: CPT

## 2017-08-26 PROCEDURE — C9602: CPT | Mod: LM

## 2017-08-26 PROCEDURE — C1725: CPT

## 2017-08-26 PROCEDURE — C1724: CPT

## 2017-08-26 PROCEDURE — 85027 COMPLETE CBC AUTOMATED: CPT

## 2017-08-26 PROCEDURE — 80048 BASIC METABOLIC PNL TOTAL CA: CPT

## 2017-08-26 PROCEDURE — C1887: CPT

## 2017-08-26 RX ORDER — ACETAMINOPHEN 500 MG
2 TABLET ORAL
Qty: 0 | Refills: 0 | COMMUNITY
Start: 2017-08-26

## 2017-08-26 RX ORDER — CLOPIDOGREL BISULFATE 75 MG/1
1 TABLET, FILM COATED ORAL
Qty: 90 | Refills: 3 | OUTPATIENT
Start: 2017-08-26 | End: 2018-08-20

## 2017-08-26 RX ORDER — DOCUSATE SODIUM 100 MG
1 CAPSULE ORAL
Qty: 0 | Refills: 0 | COMMUNITY
Start: 2017-08-26

## 2017-08-26 RX ORDER — METOPROLOL TARTRATE 50 MG
1 TABLET ORAL
Qty: 60 | Refills: 0 | OUTPATIENT
Start: 2017-08-26 | End: 2017-09-25

## 2017-08-26 RX ADMIN — Medication 81 MILLIGRAM(S): at 05:53

## 2017-08-26 RX ADMIN — Medication 25 MILLIGRAM(S): at 05:53

## 2017-08-26 RX ADMIN — CLOPIDOGREL BISULFATE 75 MILLIGRAM(S): 75 TABLET, FILM COATED ORAL at 05:53

## 2017-08-26 NOTE — DISCHARGE NOTE ADULT - MEDICATION SUMMARY - MEDICATIONS TO TAKE
I will START or STAY ON the medications listed below when I get home from the hospital:    acetaminophen 325 mg oral tablet  -- 2 tab(s) by mouth every 6 hours, As needed, Moderate Pain (4 - 6)  -- Indication: For Mild pain    aspirin 81 mg oral delayed release tablet  -- 1 tab(s) by mouth once a day  -- Indication: For stented coronary artery    losartan 25 mg oral tablet  -- 1 tab(s) by mouth once a day home  -- Indication: For hypertension    insulin glargine  -- 10 unit(s) subcutaneous once a day (at bedtime) hosp & h ome  -- Indication: For diabetes type 2    atorvastatin 40 mg oral tablet  -- 1 tab(s) by mouth once a day (at bedtime) hosp  -- Indication: For hyperlipidemia    clopidogrel 75 mg oral tablet  -- 1 tab(s) by mouth once a day  -- Indication: For stneted coronary artery    metoprolol tartrate 25 mg oral tablet  -- 1 tab(s) by mouth 2 times a day  -- Indication: For hypertension    docusate sodium 100 mg oral capsule  -- 1 cap(s) by mouth 2 times a day  -- Indication: For stool softener

## 2017-08-26 NOTE — PROGRESS NOTE ADULT - ASSESSMENT
82 year old Male from home lives alone, ambulates independently, with PMH Afib not on AC, Diabetes, CAD S/p stent? CABG? in Nov 2011, Obesity, OA, macular degeneration came with c/o SOB on exertion since one month ago. He reports that He become SOB walking one block,  His exertional SOB was progressively getting worse since one week, so he called his physician who told him to go to ER. Dyspnea is aggravated by lifting grocery bags and walking more then a block, relieved by sitting down. He denies Orthopnea, PND, chest pain, dizziness, recent URI, heartburn, fever. He also c/o chronic B/L leg swelling and knee pain due to Osteoarthritis. He reported that he goes to his cardiologist Dr. Rose olivera in [facility] who scheduled him for Nuclear Stress test but he cancelled that test as it was expensive. He did not know whether his cardiologist has done an Echo and EF. In ED, EKG was done which shows NS-T wave changes with occasional PVC. CXR was done "Focal opacity in the periphery of the left lower lobe along the lateral aspect of the left hemidiaphragm not well visualized on the lateral image which could represent focal scarring/atelectasis. Otherwise, clear lungs."  s/p cardiac cath 8/15/17 .staged PCI to the protected LM/Cx/RI in near future when renally optimized/cr stable. (25 Aug 2017 14:41)

## 2017-08-26 NOTE — DISCHARGE NOTE ADULT - CARE PLAN
Principal Discharge DX:	Coronary artery disease of native artery of native heart with stable angina pectoris  Goal:	Patient remains chest pain free and understands post cath discharge instructions.  Instructions for follow-up, activity and diet:	No heavy lifting or pushing/pulling with procedure arm for 2 weeks. No driving for 2 days. You may shower 24 hours following the procedure but avoid baths/swimming for 1 week. Check your wrist site for bleeding and/or swelling daily following procedure and call your doctor immediately if it occurs or if you experience increased pain at the site. Follow up with your cardiologist in 1-2 weeks. You may call [facility] if you have any questions/concerns regarding your procedure [phone]. Do not stop you Aspirin or Plavix unless instructed to do so by your cardiologist.  Secondary Diagnosis:	Essential hypertension  Goal:	Your blood pressure will be controlled.  Instructions for follow-up, activity and diet:	Continue with your blood pressure medications; eat a heart healthy diet with low salt diet; exercise regularly (consult with your physician or cardiologist first); maintain a heart healthy weight; if you smoke - quit (A resource to help you stop smoking is the [facility] [employer] [facility] – phone number [phone].); include healthy ways to manage stress. Continue to follow with your primary care physician or cardiologist.  Secondary Diagnosis:	Dyslipidemia  Goal:	Your LDL cholesterol will be less than 70mg/dL  Instructions for follow-up, activity and diet:	Continue with your cholesterol medications. Eat a heart healthy diet that is low in saturated fats and salt, and includes whole grains, fruits, vegetables and lean protein; exercise regularly (consult with your physician or cardiologist first); maintain a heart healthy weight; if you smoke - quit (A resource to help you stop smoking is the [facility] [employer] [facility] – phone number [phone].). Continue to follow with your primary physician or cardiologist.  Secondary Diagnosis:	Type 2 diabetes mellitus without complication, with long-term current use of insulin  Goal:	Your hemoglobin A1C will be between 7-8  Instructions for follow-up, activity and diet:	Continue to follow with your primary care MD or your endocrinologist.  Follow a heart healthy diabetic diet. If you check your fingerstick glucose at home, call your MD if it is greater than 250mg/dL on 2 occasions or less than 100mg/dL on 2 occasions. Know signs of low blood sugar, such as: dizziness, shakiness, sweating, confusion, hunger, nervousness-drink 4 ounces apple juice if occurs and call your doctor. Know early signs of high blood sugar, such as: frequent urination, increased thirst, blurry vision, fatigue, headache - call your doctor if this occurs. Follow with other practitioners to care for your diabetes, such as ophthamologist and podiatrist. Principal Discharge DX:	Coronary artery disease of native artery of native heart with stable angina pectoris  Goal:	Patient remains chest pain free and understands post cath discharge instructions.  Instructions for follow-up, activity and diet:	No heavy lifting or pushing/pulling with procedure arm for 2 weeks. No driving for 2 days. You may shower 24 hours following the procedure but avoid baths/swimming for 1 week. Check your wrist site for bleeding and/or swelling daily following procedure and call your doctor immediately if it occurs or if you experience increased pain at the site. Follow up with your cardiologist in 1-2 weeks. You may call [facility] if you have any questions/concerns regarding your procedure [phone]. Do not stop you Aspirin or Plavix unless instructed to do so by your cardiologist.  Secondary Diagnosis:	Essential hypertension  Goal:	Your blood pressure will be controlled.  Instructions for follow-up, activity and diet:	Continue with your blood pressure medications; eat a heart healthy diet with low salt diet; exercise regularly (consult with your physician or cardiologist first); maintain a heart healthy weight; if you smoke - quit (A resource to help you stop smoking is the [facility] [employer] [facility] – phone number [phone].); include healthy ways to manage stress. Continue to follow with your primary care physician or cardiologist.  Secondary Diagnosis:	Dyslipidemia  Goal:	Your LDL cholesterol will be less than 70mg/dL  Instructions for follow-up, activity and diet:	Continue with your cholesterol medications. Eat a heart healthy diet that is low in saturated fats and salt, and includes whole grains, fruits, vegetables and lean protein; exercise regularly (consult with your physician or cardiologist first); maintain a heart healthy weight; if you smoke - quit (A resource to help you stop smoking is the [facility] [employer] [facility] – phone number [phone].). Continue to follow with your primary physician or cardiologist.  Secondary Diagnosis:	Type 2 diabetes mellitus without complication, with long-term current use of insulin  Goal:	Your hemoglobin A1C will be between 7-8  Instructions for follow-up, activity and diet:	Continue to follow with your primary care MD or your endocrinologist.  Follow a heart healthy diabetic diet. If you check your fingerstick glucose at home, call your MD if it is greater than 250mg/dL on 2 occasions or less than 100mg/dL on 2 occasions. Know signs of low blood sugar, such as: dizziness, shakiness, sweating, confusion, hunger, nervousness-drink 4 ounces apple juice if occurs and call your doctor. Know early signs of high blood sugar, such as: frequent urination, increased thirst, blurry vision, fatigue, headache - call your doctor if this occurs. Follow with other practitioners to care for your diabetes, such as ophthamologist and podiatrist. Principal Discharge DX:	Coronary artery disease of native artery of native heart with stable angina pectoris  Goal:	Patient remains chest pain free and understands post cath discharge instructions.  Instructions for follow-up, activity and diet:	No heavy lifting or pushing/pulling with procedure arm for 2 weeks. No driving for 2 days. You may shower 24 hours following the procedure but avoid baths/swimming for 1 week. Check your wrist site for bleeding and/or swelling daily following procedure and call your doctor immediately if it occurs or if you experience increased pain at the site. Follow up with your cardiologist in 1-2 weeks. You may call [facility] if you have any questions/concerns regarding your procedure [phone]. Do not stop you Aspirin or Plavix unless instructed to do so by your cardiologist.  Secondary Diagnosis:	Essential hypertension  Goal:	Your blood pressure will be controlled.  Instructions for follow-up, activity and diet:	Continue with your blood pressure medications; eat a heart healthy diet with low salt diet; exercise regularly (consult with your physician or cardiologist first); maintain a heart healthy weight; if you smoke - quit (A resource to help you stop smoking is the [facility] [employer] [facility] – phone number [phone].); include healthy ways to manage stress. Continue to follow with your primary care physician or cardiologist.  Secondary Diagnosis:	Dyslipidemia  Goal:	Your LDL cholesterol will be less than 70mg/dL  Instructions for follow-up, activity and diet:	Continue with your cholesterol medications. Eat a heart healthy diet that is low in saturated fats and salt, and includes whole grains, fruits, vegetables and lean protein; exercise regularly (consult with your physician or cardiologist first); maintain a heart healthy weight; if you smoke - quit (A resource to help you stop smoking is the [facility] [employer] [facility] – phone number [phone].). Continue to follow with your primary physician or cardiologist.  Secondary Diagnosis:	Type 2 diabetes mellitus without complication, with long-term current use of insulin  Goal:	Your hemoglobin A1C will be between 7-8  Instructions for follow-up, activity and diet:	Continue to follow with your primary care MD or your endocrinologist.  Follow a heart healthy diabetic diet. If you check your fingerstick glucose at home, call your MD if it is greater than 250mg/dL on 2 occasions or less than 100mg/dL on 2 occasions. Know signs of low blood sugar, such as: dizziness, shakiness, sweating, confusion, hunger, nervousness-drink 4 ounces apple juice if occurs and call your doctor. Know early signs of high blood sugar, such as: frequent urination, increased thirst, blurry vision, fatigue, headache - call your doctor if this occurs. Follow with other practitioners to care for your diabetes, such as ophthamologist and podiatrist.

## 2017-08-26 NOTE — DISCHARGE NOTE ADULT - HOSPITAL COURSE
82 year old Male from home lives alone, ambulates independently, with PMH Afib not on AC, Diabetes, CAD S/p stent? CABG? in Nov 2011, Obesity, OA, macular degeneration came with c/o SOB on exertion since one month ago. He reports that He become SOB walking one block,  His exertional SOB was progressively getting worse since one week, so he called his physician who told him to go to ER. Dyspnea is aggravated by lifting grocery bags and walking more then a block, relieved by sitting down. He denies Orthopnea, PND, chest pain, dizziness, recent URI, heartburn, fever. He also c/o chronic B/L leg swelling and knee pain due to Osteoarthritis. He reported that he goes to his cardiologist Dr. Rose olivera in [facility] who scheduled him for Nuclear Stress test but he cancelled that test as it was expensive. He did not know whether his cardiologist has done an Echo and EF. In ED, EKG was done which shows NS-T wave changes with occasional PVC. CXR was done "Focal opacity in the periphery of the left lower lobe along the lateral aspect of the left hemidiaphragm not well visualized on the lateral image which could represent focal scarring/atelectasis. Otherwise, clear lungs."  s/p cardiac cath 8/15/17 .staged PCI to the protected LM/Cx/RI in near future when renally optimized/cr stable. (25 Aug 2017 14:41).    8/25 cardiac cath with one stent to the left main. Right radial insertion site without swelling, bleeding.

## 2017-08-26 NOTE — DISCHARGE NOTE ADULT - SECONDARY DIAGNOSIS.
Essential hypertension Dyslipidemia Type 2 diabetes mellitus without complication, with long-term current use of insulin

## 2017-08-26 NOTE — DISCHARGE NOTE ADULT - PATIENT PORTAL LINK FT
“You can access the [employer] Patient Portal, offered by [facility], by registering with the following website: [email or web address]”

## 2017-08-26 NOTE — DISCHARGE NOTE ADULT - CARE PROVIDER_API CALL
Temitope Nuñez), Cardiovascular Disease; Internal Medicine; Interventional Cardiology  [address]  [place] [address]  Phone: [phone]  Fax: [phone]    Brooklynn Nettles), Internal Medicine; Nephrology  [address]  [place] [address]  Phone: [phone]  Fax: [phone]

## 2017-08-26 NOTE — DISCHARGE NOTE ADULT - PLAN OF CARE
Patient remains chest pain free and understands post cath discharge instructions. No heavy lifting or pushing/pulling with procedure arm for 2 weeks. No driving for 2 days. You may shower 24 hours following the procedure but avoid baths/swimming for 1 week. Check your wrist site for bleeding and/or swelling daily following procedure and call your doctor immediately if it occurs or if you experience increased pain at the site. Follow up with your cardiologist in 1-2 weeks. You may call [facility] if you have any questions/concerns regarding your procedure [phone]. Do not stop you Aspirin or Plavix unless instructed to do so by your cardiologist. Your blood pressure will be controlled. Continue with your blood pressure medications; eat a heart healthy diet with low salt diet; exercise regularly (consult with your physician or cardiologist first); maintain a heart healthy weight; if you smoke - quit (A resource to help you stop smoking is the [facility] – phone number [phone].); include healthy ways to manage stress. Continue to follow with your primary care physician or cardiologist. Your LDL cholesterol will be less than 70mg/dL Continue with your cholesterol medications. Eat a heart healthy diet that is low in saturated fats and salt, and includes whole grains, fruits, vegetables and lean protein; exercise regularly (consult with your physician or cardiologist first); maintain a heart healthy weight; if you smoke - quit (A resource to help you stop smoking is the [facility] – phone number [phone].). Continue to follow with your primary physician or cardiologist. Your hemoglobin A1C will be between 7-8 Continue to follow with your primary care MD or your endocrinologist.  Follow a heart healthy diabetic diet. If you check your fingerstick glucose at home, call your MD if it is greater than 250mg/dL on 2 occasions or less than 100mg/dL on 2 occasions. Know signs of low blood sugar, such as: dizziness, shakiness, sweating, confusion, hunger, nervousness-drink 4 ounces apple juice if occurs and call your doctor. Know early signs of high blood sugar, such as: frequent urination, increased thirst, blurry vision, fatigue, headache - call your doctor if this occurs. Follow with other practitioners to care for your diabetes, such as ophthamologist and podiatrist.

## 2017-08-26 NOTE — PROGRESS NOTE ADULT - SUBJECTIVE AND OBJECTIVE BOX
Patient is a 83y old  Male who presents with a chief complaint of CAD (26 Aug 2017 00:45)          Allergies    No Known Allergies    Intolerances        Medications:  acetaminophen   Tablet 650 milliGRAM(s) Oral every 6 hours PRN  aspirin enteric coated 81 milliGRAM(s) Oral daily  atorvastatin 40 milliGRAM(s) Oral at bedtime  docusate sodium 100 milliGRAM(s) Oral two times a day  polyethylene glycol 3350 17 Gram(s) Oral daily PRN  senna 2 Tablet(s) Oral at bedtime  clopidogrel Tablet 75 milliGRAM(s) Oral daily  insulin glargine Injectable (LANTUS) 5 Unit(s) SubCutaneous at bedtime  insulin lispro (HumaLOG) corrective regimen sliding scale   SubCutaneous three times a day before meals  insulin lispro (HumaLOG) corrective regimen sliding scale   SubCutaneous at bedtime  dextrose 5%. 1000 milliLiter(s) IV Continuous <Continuous>  dextrose Gel 1 Dose(s) Oral once PRN  dextrose 50% Injectable 12.5 Gram(s) IV Push once  dextrose 50% Injectable 25 Gram(s) IV Push once  dextrose 50% Injectable 25 Gram(s) IV Push once  glucagon  Injectable 1 milliGRAM(s) IntraMuscular once PRN  metoprolol 25 milliGRAM(s) Oral two times a day      Vitals:  T(C): 36.6 (17 @ 19:30), Max: 36.8 (17 @ 14:29)  HR: 58 (17 @ 05:53) (45 - 71)  BP: 126/62 (17 @ 05:53) (106/89 - 154/67)  BP(mean): 87 (17 @ 14:41) (87 - 87)  RR: 18 (17 @ 05:53) (16 - 20)  SpO2: 98% (17 @ 05:53) (97% - 100%)  Wt(kg): --  Daily Height in cm: 182.88 (25 Aug 2017 15:30)    Daily Weight in k (25 Aug 2017 14:41)  I&O's Summary    25 Aug 2017 07:01  -  26 Aug 2017 06:26  --------------------------------------------------------  IN: 240 mL / OUT: 0 mL / NET: 240 mL          Physical Exam:  Appearance: Normal  Eyes: PERRL, EOMI  HENT: Normal oral muscosa, NC/AT  Cardiovascular: S1S2, RRR, No M/R/G, no JVD, No Lower extremity edema  Procedural Access Site: No hematoma, Non-tender to palpation, 2+ pulse, No bruit, No Ecchymosis  Respiratory: Clear to auscultation bilaterally  Gastrointestinal: Soft, Non tender, Normal Bowel Sounds  Musculoskeletal: No clubbing, No joint deformity   Neurologic: Non-focal  Lymphatic: No lymphadenopathy  Psychiatry: AAOx3, Mood & affect appropriate  Skin: No rashes, No ecchymoses, No cyanosis        139  |  103  |  19  ----------------------------<  109<H>  5.0   |  21<L>  |  1.12    Ca    9.5      26 Aug 2017 04:47  Mg     2.0     08-25      PTT - ( 25 Aug 2017 06:50 )  PTT:30.6 SEC        Lipid panel   Hgb A1c                         13.1   10.1  )-----------( 197      ( 26 Aug 2017 05:55 )             38.4         ECG: SR 64 bpm    Cath: one left main stent    Imaging:    Interpretation of Telemetry: SB/SR 50-65 bpm

## 2017-08-29 ENCOUNTER — INPATIENT (INPATIENT)
Facility: HOSPITAL | Age: 82
LOS: 3 days | Discharge: HOME | DRG: 871 | End: 2017-09-02
Attending: INTERNAL MEDICINE | Admitting: INTERNAL MEDICINE
Payer: MEDICARE

## 2017-08-29 VITALS — SYSTOLIC BLOOD PRESSURE: 172 MMHG | DIASTOLIC BLOOD PRESSURE: 98 MMHG | RESPIRATION RATE: 24 BRPM | HEART RATE: 80 BPM

## 2017-08-29 DIAGNOSIS — J18.9 PNEUMONIA, UNSPECIFIED ORGANISM: ICD-10-CM

## 2017-08-29 DIAGNOSIS — Z85.828 PERSONAL HISTORY OF OTHER MALIGNANT NEOPLASM OF SKIN: Chronic | ICD-10-CM

## 2017-08-29 DIAGNOSIS — Z95.1 PRESENCE OF AORTOCORONARY BYPASS GRAFT: Chronic | ICD-10-CM

## 2017-08-29 DIAGNOSIS — Z98.49 CATARACT EXTRACTION STATUS, UNSPECIFIED EYE: Chronic | ICD-10-CM

## 2017-08-29 LAB
ALBUMIN SERPL ELPH-MCNC: 4 G/DL — SIGNIFICANT CHANGE UP (ref 3.3–5)
ALP SERPL-CCNC: 82 U/L — SIGNIFICANT CHANGE UP (ref 40–120)
ALT FLD-CCNC: 15 U/L RC — SIGNIFICANT CHANGE UP (ref 10–45)
ANION GAP SERPL CALC-SCNC: 16 MMOL/L — SIGNIFICANT CHANGE UP (ref 5–17)
APPEARANCE UR: ABNORMAL
APTT BLD: 30.9 SEC — SIGNIFICANT CHANGE UP (ref 27.5–37.4)
AST SERPL-CCNC: 17 U/L — SIGNIFICANT CHANGE UP (ref 10–40)
BASE EXCESS BLDV CALC-SCNC: 0.8 MMOL/L — SIGNIFICANT CHANGE UP (ref -2–2)
BASOPHILS # BLD AUTO: 0 K/UL — SIGNIFICANT CHANGE UP (ref 0–0.2)
BASOPHILS NFR BLD AUTO: 0.4 % — SIGNIFICANT CHANGE UP (ref 0–2)
BILIRUB SERPL-MCNC: 0.6 MG/DL — SIGNIFICANT CHANGE UP (ref 0.2–1.2)
BILIRUB UR-MCNC: NEGATIVE — SIGNIFICANT CHANGE UP
BLD GP AB SCN SERPL QL: NEGATIVE — SIGNIFICANT CHANGE UP
BUN SERPL-MCNC: 28 MG/DL — HIGH (ref 7–23)
CA-I SERPL-SCNC: 1.17 MMOL/L — SIGNIFICANT CHANGE UP (ref 1.12–1.3)
CALCIUM SERPL-MCNC: 9.8 MG/DL — SIGNIFICANT CHANGE UP (ref 8.4–10.5)
CHLORIDE BLDV-SCNC: 108 MMOL/L — SIGNIFICANT CHANGE UP (ref 96–108)
CHLORIDE SERPL-SCNC: 103 MMOL/L — SIGNIFICANT CHANGE UP (ref 96–108)
CK MB CFR SERPL CALC: 2.1 NG/ML — SIGNIFICANT CHANGE UP (ref 0–6.7)
CO2 BLDV-SCNC: 27 MMOL/L — SIGNIFICANT CHANGE UP (ref 22–30)
CO2 SERPL-SCNC: 23 MMOL/L — SIGNIFICANT CHANGE UP (ref 22–31)
COD CRY URNS QL: ABNORMAL
COLOR SPEC: YELLOW — SIGNIFICANT CHANGE UP
CREAT SERPL-MCNC: 1.41 MG/DL — HIGH (ref 0.5–1.3)
DIFF PNL FLD: NEGATIVE — SIGNIFICANT CHANGE UP
EOSINOPHIL # BLD AUTO: 0.1 K/UL — SIGNIFICANT CHANGE UP (ref 0–0.5)
EOSINOPHIL NFR BLD AUTO: 0.6 % — SIGNIFICANT CHANGE UP (ref 0–6)
EPI CELLS # UR: SIGNIFICANT CHANGE UP /HPF
GAS PNL BLDV: 140 MMOL/L — SIGNIFICANT CHANGE UP (ref 136–145)
GAS PNL BLDV: SIGNIFICANT CHANGE UP
GLUCOSE BLDV-MCNC: 150 MG/DL — HIGH (ref 70–99)
GLUCOSE SERPL-MCNC: 154 MG/DL — HIGH (ref 70–99)
GLUCOSE UR QL: NEGATIVE — SIGNIFICANT CHANGE UP
HCO3 BLDV-SCNC: 26 MMOL/L — SIGNIFICANT CHANGE UP (ref 21–29)
HCT VFR BLD CALC: 39.4 % — SIGNIFICANT CHANGE UP (ref 39–50)
HCT VFR BLDA CALC: 42 % — SIGNIFICANT CHANGE UP (ref 39–50)
HGB BLD CALC-MCNC: 13.6 G/DL — SIGNIFICANT CHANGE UP (ref 13–17)
HGB BLD-MCNC: 13.1 G/DL — SIGNIFICANT CHANGE UP (ref 13–17)
HYALINE CASTS # UR AUTO: ABNORMAL
INR BLD: 1.2 RATIO — HIGH (ref 0.88–1.16)
KETONES UR-MCNC: NEGATIVE — SIGNIFICANT CHANGE UP
LACTATE BLDV-MCNC: 2.1 MMOL/L — HIGH (ref 0.7–2)
LEUKOCYTE ESTERASE UR-ACNC: ABNORMAL
LYMPHOCYTES # BLD AUTO: 1.5 K/UL — SIGNIFICANT CHANGE UP (ref 1–3.3)
LYMPHOCYTES # BLD AUTO: 13 % — SIGNIFICANT CHANGE UP (ref 13–44)
MCHC RBC-ENTMCNC: 30.9 PG — SIGNIFICANT CHANGE UP (ref 27–34)
MCHC RBC-ENTMCNC: 33.3 GM/DL — SIGNIFICANT CHANGE UP (ref 32–36)
MCV RBC AUTO: 92.6 FL — SIGNIFICANT CHANGE UP (ref 80–100)
MONOCYTES # BLD AUTO: 1.2 K/UL — HIGH (ref 0–0.9)
MONOCYTES NFR BLD AUTO: 10.2 % — SIGNIFICANT CHANGE UP (ref 2–14)
NEUTROPHILS # BLD AUTO: 8.9 K/UL — HIGH (ref 1.8–7.4)
NEUTROPHILS NFR BLD AUTO: 75.8 % — SIGNIFICANT CHANGE UP (ref 43–77)
NITRITE UR-MCNC: NEGATIVE — SIGNIFICANT CHANGE UP
NT-PROBNP SERPL-SCNC: 4216 PG/ML — HIGH (ref 0–300)
PCO2 BLDV: 44 MMHG — SIGNIFICANT CHANGE UP (ref 35–50)
PH BLDV: 7.38 — SIGNIFICANT CHANGE UP (ref 7.35–7.45)
PH UR: 5.5 — SIGNIFICANT CHANGE UP (ref 5–8)
PLATELET # BLD AUTO: 241 K/UL — SIGNIFICANT CHANGE UP (ref 150–400)
PO2 BLDV: 29 MMHG — SIGNIFICANT CHANGE UP (ref 25–45)
POTASSIUM BLDV-SCNC: 4.3 MMOL/L — SIGNIFICANT CHANGE UP (ref 3.5–5)
POTASSIUM SERPL-MCNC: 4.2 MMOL/L — SIGNIFICANT CHANGE UP (ref 3.5–5.3)
POTASSIUM SERPL-SCNC: 4.2 MMOL/L — SIGNIFICANT CHANGE UP (ref 3.5–5.3)
PROT SERPL-MCNC: 8 G/DL — SIGNIFICANT CHANGE UP (ref 6–8.3)
PROT UR-MCNC: 30 MG/DL
PROTHROM AB SERPL-ACNC: 13 SEC — HIGH (ref 9.8–12.7)
RAPID RVP RESULT: SIGNIFICANT CHANGE UP
RBC # BLD: 4.25 M/UL — SIGNIFICANT CHANGE UP (ref 4.2–5.8)
RBC # FLD: 13.2 % — SIGNIFICANT CHANGE UP (ref 10.3–14.5)
RBC CASTS # UR COMP ASSIST: SIGNIFICANT CHANGE UP /HPF (ref 0–2)
RH IG SCN BLD-IMP: NEGATIVE — SIGNIFICANT CHANGE UP
SAO2 % BLDV: 49 % — LOW (ref 67–88)
SODIUM SERPL-SCNC: 142 MMOL/L — SIGNIFICANT CHANGE UP (ref 135–145)
SP GR SPEC: 1.03 — HIGH (ref 1.01–1.02)
TROPONIN T SERPL-MCNC: 0.02 NG/ML — SIGNIFICANT CHANGE UP (ref 0–0.06)
UROBILINOGEN FLD QL: NEGATIVE — SIGNIFICANT CHANGE UP
WBC # BLD: 11.8 K/UL — HIGH (ref 3.8–10.5)
WBC # FLD AUTO: 11.8 K/UL — HIGH (ref 3.8–10.5)
WBC UR QL: >50 /HPF (ref 0–5)

## 2017-08-29 PROCEDURE — 99285 EMERGENCY DEPT VISIT HI MDM: CPT

## 2017-08-29 PROCEDURE — 71010: CPT | Mod: 26

## 2017-08-29 RX ORDER — PIPERACILLIN AND TAZOBACTAM 4; .5 G/20ML; G/20ML
3.38 INJECTION, POWDER, LYOPHILIZED, FOR SOLUTION INTRAVENOUS ONCE
Qty: 0 | Refills: 0 | Status: DISCONTINUED | OUTPATIENT
Start: 2017-08-29 | End: 2017-08-29

## 2017-08-29 RX ORDER — ASPIRIN/CALCIUM CARB/MAGNESIUM 324 MG
81 TABLET ORAL DAILY
Qty: 0 | Refills: 0 | Status: DISCONTINUED | OUTPATIENT
Start: 2017-08-29 | End: 2017-08-29

## 2017-08-29 RX ORDER — LOSARTAN POTASSIUM 100 MG/1
1 TABLET, FILM COATED ORAL
Qty: 0 | Refills: 0 | COMMUNITY

## 2017-08-29 RX ORDER — ASPIRIN/CALCIUM CARB/MAGNESIUM 324 MG
81 TABLET ORAL DAILY
Qty: 0 | Refills: 0 | Status: DISCONTINUED | OUTPATIENT
Start: 2017-08-29 | End: 2017-09-02

## 2017-08-29 RX ORDER — PIPERACILLIN AND TAZOBACTAM 4; .5 G/20ML; G/20ML
3.38 INJECTION, POWDER, LYOPHILIZED, FOR SOLUTION INTRAVENOUS EVERY 8 HOURS
Qty: 0 | Refills: 0 | Status: DISCONTINUED | OUTPATIENT
Start: 2017-08-29 | End: 2017-09-02

## 2017-08-29 RX ORDER — MULTIVIT-MIN/FERROUS GLUCONATE 9 MG/15 ML
1 LIQUID (ML) ORAL
Qty: 0 | Refills: 0 | COMMUNITY

## 2017-08-29 RX ORDER — DEXTROSE 50 % IN WATER 50 %
25 SYRINGE (ML) INTRAVENOUS ONCE
Qty: 0 | Refills: 0 | Status: DISCONTINUED | OUTPATIENT
Start: 2017-08-29 | End: 2017-09-02

## 2017-08-29 RX ORDER — PIPERACILLIN AND TAZOBACTAM 4; .5 G/20ML; G/20ML
3.38 INJECTION, POWDER, LYOPHILIZED, FOR SOLUTION INTRAVENOUS ONCE
Qty: 0 | Refills: 0 | Status: COMPLETED | OUTPATIENT
Start: 2017-08-29 | End: 2017-08-29

## 2017-08-29 RX ORDER — DEXTROSE 50 % IN WATER 50 %
1 SYRINGE (ML) INTRAVENOUS ONCE
Qty: 0 | Refills: 0 | Status: DISCONTINUED | OUTPATIENT
Start: 2017-08-29 | End: 2017-09-02

## 2017-08-29 RX ORDER — APIXABAN 2.5 MG/1
5 TABLET, FILM COATED ORAL EVERY 12 HOURS
Qty: 0 | Refills: 0 | Status: DISCONTINUED | OUTPATIENT
Start: 2017-08-29 | End: 2017-09-02

## 2017-08-29 RX ORDER — INSULIN LISPRO 100/ML
VIAL (ML) SUBCUTANEOUS AT BEDTIME
Qty: 0 | Refills: 0 | Status: DISCONTINUED | OUTPATIENT
Start: 2017-08-29 | End: 2017-09-02

## 2017-08-29 RX ORDER — GLUCAGON INJECTION, SOLUTION 0.5 MG/.1ML
1 INJECTION, SOLUTION SUBCUTANEOUS ONCE
Qty: 0 | Refills: 0 | Status: DISCONTINUED | OUTPATIENT
Start: 2017-08-29 | End: 2017-09-02

## 2017-08-29 RX ORDER — SODIUM CHLORIDE 9 MG/ML
1000 INJECTION, SOLUTION INTRAVENOUS
Qty: 0 | Refills: 0 | Status: DISCONTINUED | OUTPATIENT
Start: 2017-08-29 | End: 2017-09-02

## 2017-08-29 RX ORDER — ACETAMINOPHEN 500 MG
1000 TABLET ORAL ONCE
Qty: 0 | Refills: 0 | Status: COMPLETED | OUTPATIENT
Start: 2017-08-29 | End: 2017-08-29

## 2017-08-29 RX ORDER — INSULIN GLARGINE 100 [IU]/ML
10 INJECTION, SOLUTION SUBCUTANEOUS AT BEDTIME
Qty: 0 | Refills: 0 | Status: DISCONTINUED | OUTPATIENT
Start: 2017-08-29 | End: 2017-09-02

## 2017-08-29 RX ORDER — CLOPIDOGREL BISULFATE 75 MG/1
75 TABLET, FILM COATED ORAL DAILY
Qty: 0 | Refills: 0 | Status: DISCONTINUED | OUTPATIENT
Start: 2017-08-29 | End: 2017-09-02

## 2017-08-29 RX ORDER — AZITHROMYCIN 500 MG/1
500 TABLET, FILM COATED ORAL ONCE
Qty: 0 | Refills: 0 | Status: COMPLETED | OUTPATIENT
Start: 2017-08-29 | End: 2017-08-29

## 2017-08-29 RX ORDER — DEXTROSE 50 % IN WATER 50 %
12.5 SYRINGE (ML) INTRAVENOUS ONCE
Qty: 0 | Refills: 0 | Status: DISCONTINUED | OUTPATIENT
Start: 2017-08-29 | End: 2017-09-02

## 2017-08-29 RX ORDER — SODIUM CHLORIDE 9 MG/ML
1000 INJECTION INTRAMUSCULAR; INTRAVENOUS; SUBCUTANEOUS ONCE
Qty: 0 | Refills: 0 | Status: DISCONTINUED | OUTPATIENT
Start: 2017-08-29 | End: 2017-08-29

## 2017-08-29 RX ORDER — VANCOMYCIN HCL 1 G
1000 VIAL (EA) INTRAVENOUS ONCE
Qty: 0 | Refills: 0 | Status: COMPLETED | OUTPATIENT
Start: 2017-08-29 | End: 2017-08-29

## 2017-08-29 RX ORDER — SODIUM CHLORIDE 9 MG/ML
500 INJECTION INTRAMUSCULAR; INTRAVENOUS; SUBCUTANEOUS ONCE
Qty: 0 | Refills: 0 | Status: COMPLETED | OUTPATIENT
Start: 2017-08-29 | End: 2017-08-29

## 2017-08-29 RX ORDER — METOPROLOL TARTRATE 50 MG
25 TABLET ORAL
Qty: 0 | Refills: 0 | Status: DISCONTINUED | OUTPATIENT
Start: 2017-08-29 | End: 2017-09-02

## 2017-08-29 RX ADMIN — AZITHROMYCIN 250 MILLIGRAM(S): 500 TABLET, FILM COATED ORAL at 13:51

## 2017-08-29 RX ADMIN — SODIUM CHLORIDE 500 MILLILITER(S): 9 INJECTION INTRAMUSCULAR; INTRAVENOUS; SUBCUTANEOUS at 20:53

## 2017-08-29 RX ADMIN — Medication 250 MILLIGRAM(S): at 15:54

## 2017-08-29 RX ADMIN — INSULIN GLARGINE 10 UNIT(S): 100 INJECTION, SOLUTION SUBCUTANEOUS at 21:36

## 2017-08-29 RX ADMIN — APIXABAN 5 MILLIGRAM(S): 2.5 TABLET, FILM COATED ORAL at 21:36

## 2017-08-29 RX ADMIN — Medication 400 MILLIGRAM(S): at 12:47

## 2017-08-29 RX ADMIN — PIPERACILLIN AND TAZOBACTAM 200 GRAM(S): 4; .5 INJECTION, POWDER, LYOPHILIZED, FOR SOLUTION INTRAVENOUS at 12:48

## 2017-08-29 NOTE — CONSULT NOTE ADULT - ATTENDING COMMENTS
Patient seen and examined, agree with above assessment and plan as transcribed above.    - Start eliquis, groin stable  - Cont ASA/ Plavix  - Abx per med    Sandeep Ruiz MD, FACC  [facility], [facility]  [address] [place] [address]  PHONE:  [phone]  BEEPER : [phone] Patient seen and examined, agree with above assessment and plan as transcribed above.    - Start eliquis, groin stable  - Cont ASA/ Plavix  - Abx per med  - Elevated BNP, check echo    Sandeep Ruiz MD, FACC  [facility], [facility]  [address] [place] [address]  PHONE:  [phone]  BEEPER : [phone]

## 2017-08-29 NOTE — ED PROVIDER NOTE - OBJECTIVE STATEMENT
82 y/o w/ hx CAD s/p CABG, DM, A-fib on aspirin/plavix, recently admission w/ coronary stent on 8/15/17, discharged 4 days ago, reports SOB yesterday 84 y/o w/ hx CAD s/p CABG, DM, A-fib on aspirin/plavix, recently admission w/ coronary stent on 8/15/17, discharged 4 days ago, reports cough and SOB (nonexertional) starting yesterday, worse today. Not positional, denies orthopnea. Denies CP. Reports lower back discomfort after coughing. No n/v/d. Denies new calf pain or swelling.   primary care physician: Nick Hernandez

## 2017-08-29 NOTE — ED PROVIDER NOTE - ATTENDING CONTRIBUTION TO CARE
Dr. Coleman:  Patient seen and examined:  82 yo M with hx of CAD s/p CABG, DM, a-fib on aspirin/ plavix p/w SOB since yesterday. Patient denies chest pain, worsening leg swelling. He states he feels that he has water in his lungs.   VS: febrile to 101,   ekg: afib, rate 96, no los, no std  exam:  NAD  normocephalic  normal sclera  irregularly irregular rhythm  lungs CTA except ? rhonchi in LLL  ext: no pitting edema or calf tenderness  a/p: SOB, r/o pneumonia, r/o CHF  - CXR, labs, cardiac enzymes, probnp  XR concerning for ? LLL infiltrate, will treat given fever and recent admission  proBNP elevated  pt to be admitted Dr. Coleman:  Patient seen and examined:  82 yo M with hx of CAD s/p CABG, DM, a-fib on aspirin/ plavix p/w SOB since yesterday. Patient denies chest pain, worsening leg swelling. He states he feels that he has water in his lungs.   VS: febrile to 101,   ekg: afib, rate 96, no los, no std  exam:  NAD  normocephalic  normal sclera  irregularly irregular rhythm  lungs CTA except ? rhonchi in LLL  ext: no pitting edema or calf tenderness  a/p: SOB, r/o pneumonia, r/o CHF  - CXR, labs, cardiac enzymes, probnp  XR concerning for ? LLL infiltrate, will treat given fever and recent admission  proBNP elevated  admit

## 2017-08-29 NOTE — ED PROVIDER NOTE - PMH
Atrial fibrillation    CAD (coronary artery disease)    DM (diabetes mellitus)    Dyslipidemia    History of Obesity    HTN (hypertension)    Macular degeneration    OA (osteoarthritis)

## 2017-08-29 NOTE — H&P ADULT - HISTORY OF PRESENT ILLNESS
82 y/o w/ hx CAD s/p CABG, DM, A-fib on aspirin/plavix, recently admission w/ coronary stent on 8/15/17, discharged 4 days ago, reports cough and SOB (nonexertional) starting yesterday, worse today. Not positional, denies orthopnea. Denies CP. Reports lower back discomfort after coughing. No n/v/d. Denies new calf pain or swelling.

## 2017-08-29 NOTE — ED PROVIDER NOTE - PSH
H/O Moh's micrographic surgery for skin cancer    History of cataract surgery    S/P CABG x 4  2011 at [facility]

## 2017-08-29 NOTE — H&P ADULT - NSHPLABSRESULTS_GEN_ALL_CORE
LABS:                        11.3   9.4   )-----------( 211      ( 30 Aug 2017 06:12 )             34.4     08-30    141  |  106  |  20  ----------------------------<  122<H>  4.0   |  24  |  1.11    Ca    9.0      30 Aug 2017 06:13  Phos  3.6     08-  Mg     2.2     -    TPro  8.0  /  Alb  4.0  /  TBili  0.6  /  DBili  x   /  AST  17  /  ALT  15  /  AlkPhos  82  08-    PT/INR - ( 29 Aug 2017 12:20 )   PT: 13.0 sec;   INR: 1.20 ratio         PTT - ( 29 Aug 2017 12:20 )  PTT:30.9 sec  Urinalysis Basic - ( 29 Aug 2017 18:00 )    Color: Yellow / Appearance: x / S.026 / pH: x  Gluc: x / Ketone: Negative  / Bili: Negative / Urobili: Negative   Blood: x / Protein: 30 mg/dL / Nitrite: Negative   Leuk Esterase: Large / RBC: 0-2 /HPF / WBC >50 /HPF   Sq Epi: x / Non Sq Epi: OCC /HPF / Bacteria: x            RADIOLOGY & ADDITIONAL TESTS:

## 2017-08-29 NOTE — ED ADULT NURSE NOTE - OBJECTIVE STATEMENT
Came in with c/o SOB. No distress. Breathing easy and non labored. Skin warm to touch Came in with c/o SOB. No distress. Breathing easy and non labored. Skin warm to touch. No chills. Febrile in the ER. No distress. able to speak in complete sentences. Pt able to move all extremities.

## 2017-08-29 NOTE — ED PROVIDER NOTE - PHYSICAL EXAMINATION
Gen: NAD  Eyes:  sclerae white, no icterus  ENT: Moist mucous membranes. No exudates  Neck: supple, no LAD, mass or goiter, trachea midline  CV: Irregular. Audible S1 and S2. No murmurs, rubs, gallops, S3, nor S4  Pulm: Good resp effort, rhonchi LLL  Abd: BS+, nondistended, No tenderness to palpation  Musculoskeletal:  No edema  Skin: no lesions or scars noted  Psych: mood good, affect full range and congruent with mood.  Neurologic: AAOx3

## 2017-08-29 NOTE — ED PROVIDER NOTE - MEDICAL DECISION MAKING DETAILS
a/p: SOB, r/o pneumonia, r/o CHF  - CXR, labs, cardiac enzymes, probnp  XR concerning for ? LLL infiltrate, will treat given fever and recent admission

## 2017-08-29 NOTE — H&P ADULT - ASSESSMENT
82 y/o w/ hx CAD s/p CABG, DM, A-fib on aspirin/plavix, recently admission w/ coronary stent on 8/15/17, discharged 4 days ago, reports cough and SOB admitted for poss pna. wbc 11.8, temp 101. positive UA, no urine culture was sent.    poss gram negative and gram positive pneumonia, poss UTI- start empiric zosyn.  follow up cultures. monitor temp. id consult  afib- on eliquis as per cardio, rate is controlled on metoprolol  dm- fs qid, check hgb a1c, insulin sliding scale  cad- s/p coronary stent- cw asa and plavix 84 y/o w/ hx CAD s/p CABG, DM, A-fib on aspirin/plavix, recently admission w/ coronary stent on 8/15/17, discharged 4 days ago, reports cough and SOB admitted for sepsis and poss pna and uti . wbc 11.8, temp 101. tachycardia positive UA, no urine culture was not sent.    poss gram negative and gram positive pneumonia, poss UTI- start empiric zosyn.  follow up cultures. monitor temp. id consult  afib- on eliquis as per cardio, rate is controlled on metoprolol  dm- fs qid, check hgb a1c, insulin sliding scale  cad- s/p coronary stent- cw asa and plavix

## 2017-08-30 LAB
ANION GAP SERPL CALC-SCNC: 11 MMOL/L — SIGNIFICANT CHANGE UP (ref 5–17)
BUN SERPL-MCNC: 20 MG/DL — SIGNIFICANT CHANGE UP (ref 7–23)
CALCIUM SERPL-MCNC: 9 MG/DL — SIGNIFICANT CHANGE UP (ref 8.4–10.5)
CHLORIDE SERPL-SCNC: 106 MMOL/L — SIGNIFICANT CHANGE UP (ref 96–108)
CO2 SERPL-SCNC: 24 MMOL/L — SIGNIFICANT CHANGE UP (ref 22–31)
CREAT SERPL-MCNC: 1.11 MG/DL — SIGNIFICANT CHANGE UP (ref 0.5–1.3)
FERRITIN SERPL-MCNC: 207 NG/ML — SIGNIFICANT CHANGE UP (ref 30–400)
FOLATE SERPL-MCNC: >20 NG/ML — SIGNIFICANT CHANGE UP (ref 4.8–24.2)
GLUCOSE SERPL-MCNC: 122 MG/DL — HIGH (ref 70–99)
HCT VFR BLD CALC: 34.4 % — LOW (ref 39–50)
HGB BLD-MCNC: 11.3 G/DL — LOW (ref 13–17)
IRON SATN MFR SERPL: 16 % — SIGNIFICANT CHANGE UP (ref 16–55)
IRON SATN MFR SERPL: 30 UG/DL — LOW (ref 45–165)
MAGNESIUM SERPL-MCNC: 2.2 MG/DL — SIGNIFICANT CHANGE UP (ref 1.6–2.6)
MCHC RBC-ENTMCNC: 30.6 PG — SIGNIFICANT CHANGE UP (ref 27–34)
MCHC RBC-ENTMCNC: 32.9 GM/DL — SIGNIFICANT CHANGE UP (ref 32–36)
MCV RBC AUTO: 93.3 FL — SIGNIFICANT CHANGE UP (ref 80–100)
PHOSPHATE SERPL-MCNC: 3.6 MG/DL — SIGNIFICANT CHANGE UP (ref 2.5–4.5)
PLATELET # BLD AUTO: 211 K/UL — SIGNIFICANT CHANGE UP (ref 150–400)
POTASSIUM SERPL-MCNC: 4 MMOL/L — SIGNIFICANT CHANGE UP (ref 3.5–5.3)
POTASSIUM SERPL-SCNC: 4 MMOL/L — SIGNIFICANT CHANGE UP (ref 3.5–5.3)
RBC # BLD: 3.69 M/UL — LOW (ref 4.2–5.8)
RBC # FLD: 13 % — SIGNIFICANT CHANGE UP (ref 10.3–14.5)
SODIUM SERPL-SCNC: 141 MMOL/L — SIGNIFICANT CHANGE UP (ref 135–145)
TIBC SERPL-MCNC: 182 UG/DL — LOW (ref 220–430)
TSH SERPL-MCNC: 1.62 UIU/ML — SIGNIFICANT CHANGE UP (ref 0.27–4.2)
UIBC SERPL-MCNC: 152 UG/DL — SIGNIFICANT CHANGE UP (ref 110–370)
VIT B12 SERPL-MCNC: 405 PG/ML — SIGNIFICANT CHANGE UP (ref 243–894)
WBC # BLD: 9.4 K/UL — SIGNIFICANT CHANGE UP (ref 3.8–10.5)
WBC # FLD AUTO: 9.4 K/UL — SIGNIFICANT CHANGE UP (ref 3.8–10.5)

## 2017-08-30 PROCEDURE — 93306 TTE W/DOPPLER COMPLETE: CPT | Mod: 26

## 2017-08-30 PROCEDURE — 99222 1ST HOSP IP/OBS MODERATE 55: CPT

## 2017-08-30 RX ADMIN — PIPERACILLIN AND TAZOBACTAM 25 GRAM(S): 4; .5 INJECTION, POWDER, LYOPHILIZED, FOR SOLUTION INTRAVENOUS at 22:10

## 2017-08-30 RX ADMIN — Medication 25 MILLIGRAM(S): at 18:02

## 2017-08-30 RX ADMIN — Medication 81 MILLIGRAM(S): at 10:43

## 2017-08-30 RX ADMIN — Medication 25 MILLIGRAM(S): at 06:04

## 2017-08-30 RX ADMIN — CLOPIDOGREL BISULFATE 75 MILLIGRAM(S): 75 TABLET, FILM COATED ORAL at 10:43

## 2017-08-30 RX ADMIN — APIXABAN 5 MILLIGRAM(S): 2.5 TABLET, FILM COATED ORAL at 10:43

## 2017-08-30 RX ADMIN — PIPERACILLIN AND TAZOBACTAM 25 GRAM(S): 4; .5 INJECTION, POWDER, LYOPHILIZED, FOR SOLUTION INTRAVENOUS at 13:25

## 2017-08-30 RX ADMIN — PIPERACILLIN AND TAZOBACTAM 25 GRAM(S): 4; .5 INJECTION, POWDER, LYOPHILIZED, FOR SOLUTION INTRAVENOUS at 06:04

## 2017-08-30 RX ADMIN — INSULIN GLARGINE 10 UNIT(S): 100 INJECTION, SOLUTION SUBCUTANEOUS at 22:10

## 2017-08-30 RX ADMIN — APIXABAN 5 MILLIGRAM(S): 2.5 TABLET, FILM COATED ORAL at 22:10

## 2017-08-30 NOTE — CONSULT NOTE ADULT - SUBJECTIVE AND OBJECTIVE BOX
HISTORY OF PRESENT ILLNESS:    83y Male with h/o CAD s/p CABG, PAF, HTN, HLD admitted with exertional angina, abnormal NST, EF 40-45%, Cath with severe distal LM and ostial Lcx disease.  Post cath Groin Hematoma.  US negative for pseudoaneurysm.  s/p PCI TO LM on 8/25/17.  Patient presents with SOB Cough and fever.  On Abx for suspected PNA.  Denies CP, palps, LOC.      PAST MEDICAL & SURGICAL HISTORY:  Macular degeneration  OA (osteoarthritis)  Atrial fibrillation  CAD (coronary artery disease)  HTN (hypertension)  DM (diabetes mellitus)  Dyslipidemia  History of Obesity  H/O Moh's micrographic surgery for skin cancer  History of cataract surgery  S/P CABG x 4: 2011 at [facility]  No Past Surgical History      FAMILY HISTORY:  No pertinent family history in first degree relatives      SOCIAL HISTORY:    ( x) Non-smoker ( ) Smoker ( ) Alcohol Abuse ( ) IVDA    Allergies  No Known Allergies    MEDICATIONS  (STANDING):  sodium chloride 0.9% Bolus 500 milliLiter(s) IV Bolus once  insulin glargine Injectable (LANTUS) 10 Unit(s) SubCutaneous at bedtime  insulin lispro (HumaLOG) corrective regimen sliding scale   SubCutaneous at bedtime  dextrose 5%. 1000 milliLiter(s) (50 mL/Hr) IV Continuous <Continuous>  dextrose 50% Injectable 12.5 Gram(s) IV Push once  dextrose 50% Injectable 25 Gram(s) IV Push once  dextrose 50% Injectable 25 Gram(s) IV Push once    MEDICATIONS  (PRN):  dextrose Gel 1 Dose(s) Oral once PRN Blood Glucose LESS THAN 70 milliGRAM(s)/deciliter  glucagon  Injectable 1 milliGRAM(s) IntraMuscular once PRN Glucose LESS THAN 70 milligrams/deciliter      REVIEW OF SYSTEMS:     CONSTITUTIONAL: +fever, No chills  RESPIRATORY: +Cough +SOB No wheeze, No hemoptysis  CARDIOVASCULAR: No chest pain, palpitations, passing out, dizziness, or leg swelling  GASTROINTESTINAL: No abdominal or epigastric pain. No nausea, vomiting, or hematemesis; No diarrhea or constipation. No melena or hematochezia.  GENITOURINARY: No dysuria, frequency, hematuria, or incontinence  NEUROLOGICAL: No headaches, memory loss, loss of strength, numbness, or tremors  SKIN: No itching, burning, rashes, or lesions     [ x] All others negative	  [ ] Unable to obtain    PHYSICAL EXAM:  Vital Signs Last 24 Hrs  T(C): 36.7 (29 Aug 2017 15:31), Max: 38.3 (29 Aug 2017 12:04)  T(F): 98 (29 Aug 2017 15:31), Max: 101 (29 Aug 2017 12:04)  HR: 98 (29 Aug 2017 15:31) (80 - 121)  BP: 126/78 (29 Aug 2017 15:31) (125/62 - 172/98)  RR: 19 (29 Aug 2017 15:31) (16 - 26)  SpO2: 96% (29 Aug 2017 15:31) (95% - 97%)    Appearance: Normal	  HEENT:   Normal oral mucosa, PERRL, EOMI	  Lymphatic: No lymphadenopathy  Cardiovascular: Normal S1 S2, No JVD, No murmurs, No edema  Respiratory: Diminshed BS B/L	  Gastrointestinal:  Soft, Non-tender, + BS	  Skin: No rashes, No ecchymoses, No cyanosis	  Neurologic: Non-focal  Extremities: Normal range of motion, No clubbing, cyanosis or edema  Vascular: Peripheral pulses palpable 2+ bilaterally    DATA: 	      ECG:  NSR	    PREVIOUS DIAGNOSTIC TESTING:      < from: Cardiac Cath Lab - Adult (08.25.17 @ 14:34) >  CORONARY VESSELS:  LM:   --  LM: There was a 90 % stenosis.  COMPLICATIONS: There were no complications.  DIAGNOSTIC RECOMMENDATIONS: ASA and Plavix for 1 year.  INTERVENTIONAL RECOMMENDATIONS: ASA and Plavix for 1 year.  Prepared and signed by  Emeterio Becker M.D.  Signed 08/25/2017 15:26:23    < end of copied text >    < from: Transthoracic Echocardiogram (08.12.17 @ 07:13) >  Ejection Fraction Visual Estimate: 40-45 %    ------------------------------------------------------------------------  OBSERVATIONS:  Mitral Valve: Normal mitral valve. Mild mitral  regurgitation.  Aortic Root: Aortic Root: 4.0 cm.    Aortic Valve: Calcified trileaflet aortic valve with normal  opening.  Left Atrium: Moderate left atrial enlargement.  Left Ventricle: Moderate global left ventricular  dysfunction (EF=40-45%). Moderate concentric left  ventricular hypertrophy. Grade III diastolic dysfunction  Right Heart: Normal right atrium. Normal right ventricular  size and function. There is mild tricuspid regurgitation.  There is mild pulmonic regurgitation.  Pericardium/PleuraNormal pericardium with no pericardial  effusion.  Hemodynamic: RV systolic pressure is moderately increased  (PASP 48mm Hg).  ------------------------------------------------------------------------  CONCLUSIONS:  1. Normal mitral valve. Mild mitral regurgitation.  2. Calcified trileaflet aortic valve with normal opening.  3. Aortic Root: 4.0 cm.  4. Moderate left atrial enlargement.  5. Moderate concentric left ventricular hypertrophy.  6. Moderate global left ventricular dysfunction  (EF=40-45%).  7. Grade III diastolic dysfunction  8. Normal right atrium.  9. Normal right ventricular size and function.  10. RV systolic pressure is moderately increased (PASP 48mm  Hg).  11. There is mild tricuspid regurgitation.  12. There is mild pulmonic regurgitation.  13. Normal pericardium with no pericardial effusion.    ------------------------------------------------------------------------  Confirmed on  8/13/2017 - 08:29:04 by Paula Martinez MD    < end of copied text >      	  LABS:	 	    CARDIAC MARKERS ( 29 Aug 2017 12:20 )  x     / 0.02 ng/mL / x     / x     / 2.1 ng/mL                        13.1   11.8  )-----------( 241      ( 29 Aug 2017 12:20 )             39.4     142  |  103  |  28<H>  ----------------------------<  154<H>  4.2   |  23  |  1.41<H>    Ca    9.8      29 Aug 2017 12:20    TPro  8.0  /  Alb  4.0  /  TBili  0.6  /  DBili  x   /  AST  17  /  ALT  15  /  AlkPhos  82  08-29    PT/INR - ( 29 Aug 2017 12:20 )   PT: 13.0 sec;   INR: 1.20 ratio       PTT - ( 29 Aug 2017 12:20 )  PTT:30.9 sec  Serum Pro-Brain Natriuretic Peptide: 4216 pg/mL (08-29 @ 12:20)    ASSESSMENT/PLAN: 	  83y Male with h/o CAD s/p CABG, PAF, HTN, HLD admitted with exertional angina, abnormal NST, EF 40-45%, Cath with severe distal LM and ostial Lcx disease.  Post cath Groin Hematoma.  US negative for pseudoaneurysm.  s/p PCI TO LM on 8/25/17.  Patient presents with SOB Cough and fever.  On Abx for suspected PNA.    --Cont ASA PLAVIX, statin for recent MANNY  --Given known PAF with elevated CHADSVASC score, start Eliquis 5 BID  --Will follow with you  --repeat TTE with definity here    Vivienne Abernathy PA-C  [facility]  [address], [address]   [place] [address]  office [phone]  pager [phone]
"HPI: 84 y/o w/ hx CAD s/p CABG, DM, A-fib on aspirin/plavix, recently admission w/ coronary stent on 8/15/17, discharged 4 days ago, reports cough and SOB (nonexertional) starting yesterday, worse today. Not positional, denies orthopnea. Denies CP. Reports lower back discomfort after coughing. No n/v/d. Denies new calf pain or swelling. (29 Aug 2017 23:14)"    Above history reviewed. Saw/spoke to patient. Patient initially presented to hospital for concerns for worsening cough and SOB. No overt chest pain, produced yellow sputum with cough. No subjective fevers but did have fevers in ED. No abd pain, no diarrhea, no dysuria, no other complaints. Today patient feels significantly improved after receiving antibiotics. Otherwise no other focal complaints, no skin/soft tissue pain or redness, no other focal complaints.    PAST MEDICAL & SURGICAL HISTORY:  Macular degeneration  OA (osteoarthritis)  Atrial fibrillation  CAD (coronary artery disease)  HTN (hypertension)  DM (diabetes mellitus)  Dyslipidemia  History of Obesity  H/O Moh's micrographic surgery for skin cancer  History of cataract surgery  S/P CABG x 4:  at [facility]  No Past Surgical History    Allergies    No Known Allergies    ANTIMICROBIALS:  piperacillin/tazobactam IVPB. 3.375 every 8 hours    OTHER MEDS:  insulin glargine Injectable (LANTUS) 10 Unit(s) SubCutaneous at bedtime  insulin lispro (HumaLOG) corrective regimen sliding scale   SubCutaneous at bedtime  dextrose 5%. 1000 milliLiter(s) IV Continuous <Continuous>  dextrose Gel 1 Dose(s) Oral once PRN  dextrose 50% Injectable 12.5 Gram(s) IV Push once  dextrose 50% Injectable 25 Gram(s) IV Push once  dextrose 50% Injectable 25 Gram(s) IV Push once  glucagon  Injectable 1 milliGRAM(s) IntraMuscular once PRN  aspirin  chewable 81 milliGRAM(s) Oral daily  clopidogrel Tablet 75 milliGRAM(s) Oral daily  apixaban 5 milliGRAM(s) Oral every 12 hours  metoprolol 25 milliGRAM(s) Oral two times a day    SOCIAL HISTORY: No tobacco, no alcohol, no illicit drugs    FAMILY HISTORY:  No pertinent family history in first degree relatives    Drug Dosing Weight  Height (cm): 182.88 (29 Aug 2017 17:56)  Weight (kg): 109.9 (29 Aug 2017 17:56)  BMI (kg/m2): 32.9 (29 Aug 2017 17:56)  BSA (m2): 2.31 (29 Aug 2017 17:56)    PE:    Vital Signs Last 24 Hrs  T(C): 36.8 (30 Aug 2017 13:35), Max: 36.8 (30 Aug 2017 05:54)  T(F): 98.2 (30 Aug 2017 13:35), Max: 98.2 (30 Aug 2017 05:54)  HR: 64 (30 Aug 2017 13:35) (64 - 98)  BP: 124/55 (30 Aug 2017 13:35) (124/55 - 146/81)  BP(mean): --  RR: 18 (30 Aug 2017 13:35) (18 - 19)  SpO2: 96% (30 Aug 2017 13:35) (95% - 98%)    Gen: AOx3, NAD, non-toxic, pleasant  CV: S1+S2 normal, no murmurs, nontachycardic  Resp: Clear bilat, no resp distress, soft crackles in bases  Abd: Soft, nontender, +BS  Ext: No LE edema, no wounds  : No Rosenbaum, no suprapubic tenderness  IV/Skin: No thrombophlebitis, no rash  Neuro: No focal deficits, no sensory deficits    LABS:                        11.3   9.4   )-----------( 211      ( 30 Aug 2017 06:12 )             34.4     08-30    141  |  106  |  20  ----------------------------<  122<H>  4.0   |  24  |  1.11    Ca    9.0      30 Aug 2017 06:13  Phos  3.6     08-30  Mg     2.2     08-30    TPro  8.0  /  Alb  4.0  /  TBili  0.6  /  DBili  x   /  AST  17  /  ALT  15  /  AlkPhos  82  08-29    Urinalysis Basic - ( 29 Aug 2017 18:00 )    Color: Yellow / Appearance: x / S.026 / pH: x  Gluc: x / Ketone: Negative  / Bili: Negative / Urobili: Negative   Blood: x / Protein: 30 mg/dL / Nitrite: Negative   Leuk Esterase: Large / RBC: 0-2 /HPF / WBC >50 /HPF   Sq Epi: x / Non Sq Epi: OCC /HPF / Bacteria: x    MICROBIOLOGY:    BCX x2 pending    Rapid RVP Result: Deyvi ( @ 12:42)    RADIOLOGY:     CXR:    FINDINGS:   There are median sternotomy wires, status post CABG.  Cardiac size is within normal limits.  There are no focal lung opacities  There is a small left pleural effusion, improved from prior study.  No pneumothorax.

## 2017-08-30 NOTE — CONSULT NOTE ADULT - ASSESSMENT
84 yo M with CAD-CABG, DM, obesity, A Fib with shortness of breath      (Full consult to follow) 84 yo M with CAD-CABG, DM, obesity, A Fib with shortness of breath. In ED, patient noted to have fever to 101F, tachycardia. Only complaint is shortness of breath and cough. No dysuria, no pyuria, no abd pain, no diarrhea, no pain at prior IV site. Recent cath to R radial--no signs infection. UA positive, but no urinary symptoms. CXR equivocal for pneumonia. Given picture of sepsis would presume is 2/2 to pneumonia based on clinical presentation. Treat for HCAP considering recent admission. Pt overall improved since admission.  - Zosyn 3.375g q 8  - F/U BCXs  - Check Sputum culture, check urine legionella  - If continues to have fever would consider CT chest but can hold of for now      Issac Smiley MD  Pager [phone]  After 5pm and on weekends call [phone]

## 2017-08-30 NOTE — PROGRESS NOTE ADULT - SUBJECTIVE AND OBJECTIVE BOX
Patient is a 83y old  Male who presents with a chief complaint of sob (29 Aug 2017 23:14)      SUBJECTIVE / OVERNIGHT EVENTS: feels much better.  No chest pain. No shortness of breath. No complaints. No events overnight.     MEDICATIONS  (STANDING):  insulin glargine Injectable (LANTUS) 10 Unit(s) SubCutaneous at bedtime  insulin lispro (HumaLOG) corrective regimen sliding scale   SubCutaneous at bedtime  dextrose 5%. 1000 milliLiter(s) (50 mL/Hr) IV Continuous <Continuous>  dextrose 50% Injectable 12.5 Gram(s) IV Push once  dextrose 50% Injectable 25 Gram(s) IV Push once  dextrose 50% Injectable 25 Gram(s) IV Push once  aspirin  chewable 81 milliGRAM(s) Oral daily  clopidogrel Tablet 75 milliGRAM(s) Oral daily  apixaban 5 milliGRAM(s) Oral every 12 hours  metoprolol 25 milliGRAM(s) Oral two times a day  piperacillin/tazobactam IVPB. 3.375 Gram(s) IV Intermittent every 8 hours    MEDICATIONS  (PRN):  dextrose Gel 1 Dose(s) Oral once PRN Blood Glucose LESS THAN 70 milliGRAM(s)/deciliter  glucagon  Injectable 1 milliGRAM(s) IntraMuscular once PRN Glucose LESS THAN 70 milligrams/deciliter      Vital Signs Last 24 Hrs  T(C): 36.8 (30 Aug 2017 05:54), Max: 36.8 (30 Aug 2017 05:54)  T(F): 98.2 (30 Aug 2017 05:54), Max: 98.2 (30 Aug 2017 05:54)  HR: 73 (30 Aug 2017 05:54) (65 - 98)  BP: 133/72 (30 Aug 2017 05:54) (126/78 - 146/81)  BP(mean): --  RR: 18 (30 Aug 2017 05:54) (18 - 19)  SpO2: 95% (30 Aug 2017 05:54) (95% - 98%)  CAPILLARY BLOOD GLUCOSE  126 (30 Aug 2017 12:03)  121 (30 Aug 2017 07:54)  128 (29 Aug 2017 21:22)  171 (29 Aug 2017 17:20)        I&O's Summary    29 Aug 2017 07:01  -  30 Aug 2017 07:00  --------------------------------------------------------  IN: 360 mL / OUT: 600 mL / NET: -240 mL    30 Aug 2017 07:01  -  30 Aug 2017 13:20  --------------------------------------------------------  IN: 0 mL / OUT: 400 mL / NET: -400 mL        PHYSICAL EXAM:  GENERAL: NAD, well-developed  HEAD:  Atraumatic, Normocephalic  EYES: EOMI, PERRLA, conjunctiva and sclera clear  NECK: Supple, No JVD  CHEST/LUNG: Clear to auscultation bilaterally; No wheeze  HEART: Regular rate and rhythm; No murmurs, rubs, or gallops  ABDOMEN: Soft, Nontender, Nondistended; Bowel sounds present  EXTREMITIES:  2+ Peripheral Pulses, No clubbing, cyanosis, or edema  PSYCH: AAOx3  NEUROLOGY: non-focal  SKIN: No rashes or lesions    LABS:                        11.3   9.4   )-----------( 211      ( 30 Aug 2017 06:12 )             34.4     08-30    141  |  106  |  20  ----------------------------<  122<H>  4.0   |  24  |  1.11    Ca    9.0      30 Aug 2017 06:13  Phos  3.6     08-30  Mg     2.2     08-30    TPro  8.0  /  Alb  4.0  /  TBili  0.6  /  DBili  x   /  AST  17  /  ALT  15  /  AlkPhos  82  08-29    PT/INR - ( 29 Aug 2017 12:20 )   PT: 13.0 sec;   INR: 1.20 ratio         PTT - ( 29 Aug 2017 12:20 )  PTT:30.9 sec  CARDIAC MARKERS ( 29 Aug 2017 12:20 )  x     / 0.02 ng/mL / x     / x     / 2.1 ng/mL      Urinalysis Basic - ( 29 Aug 2017 18:00 )    Color: Yellow / Appearance: x / S.026 / pH: x  Gluc: x / Ketone: Negative  / Bili: Negative / Urobili: Negative   Blood: x / Protein: 30 mg/dL / Nitrite: Negative   Leuk Esterase: Large / RBC: 0-2 /HPF / WBC >50 /HPF   Sq Epi: x / Non Sq Epi: OCC /HPF / Bacteria: x        RADIOLOGY & ADDITIONAL TESTS:    Imaging Personally Reviewed:    Consultant(s) Notes Reviewed:      Care Discussed with Consultants/Other Providers:

## 2017-08-30 NOTE — PROGRESS NOTE ADULT - SUBJECTIVE AND OBJECTIVE BOX
Subjective:  pt seen and examined, no complaints on exam.      insulin glargine Injectable (LANTUS) 10 Unit(s) SubCutaneous at bedtime  insulin lispro (HumaLOG) corrective regimen sliding scale   SubCutaneous at bedtime  dextrose 5%. 1000 milliLiter(s) IV Continuous <Continuous>  dextrose Gel 1 Dose(s) Oral once PRN  dextrose 50% Injectable 12.5 Gram(s) IV Push once  dextrose 50% Injectable 25 Gram(s) IV Push once  dextrose 50% Injectable 25 Gram(s) IV Push once  glucagon  Injectable 1 milliGRAM(s) IntraMuscular once PRN  aspirin  chewable 81 milliGRAM(s) Oral daily  clopidogrel Tablet 75 milliGRAM(s) Oral daily  apixaban 5 milliGRAM(s) Oral every 12 hours  metoprolol 25 milliGRAM(s) Oral two times a day  piperacillin/tazobactam IVPB. 3.375 Gram(s) IV Intermittent every 8 hours                            11.3   9.4   )-----------( 211      ( 30 Aug 2017 06:12 )             34.4       Hemoglobin: 11.3 g/dL (08-30 @ 06:12)  Hemoglobin: 13.1 g/dL (08-29 @ 12:20)  Hemoglobin: 13.1 g/dL (08-26 @ 05:55)  Hemoglobin: 11.9 g/dL (08-25 @ 07:30)      08-30    141  |  106  |  20  ----------------------------<  122<H>  4.0   |  24  |  1.11    Ca    9.0      30 Aug 2017 06:13  Phos  3.6     08-30  Mg     2.2     08-30    TPro  8.0  /  Alb  4.0  /  TBili  0.6  /  DBili  x   /  AST  17  /  ALT  15  /  AlkPhos  82  08-29    Creatinine Trend: 1.11<--, 1.41<--, 1.12<--, 1.18<--, 1.14<--, 1.19<--    COAGS: PT/INR - ( 29 Aug 2017 12:20 )   PT: 13.0 sec;   INR: 1.20 ratio         PTT - ( 29 Aug 2017 12:20 )  PTT:30.9 sec    CARDIAC MARKERS ( 29 Aug 2017 12:20 )  x     / 0.02 ng/mL / x     / x     / 2.1 ng/mL        T(C): 36.8 (08-30-17 @ 05:54), Max: 38.3 (08-29-17 @ 12:04)  HR: 73 (08-30-17 @ 05:54) (65 - 121)  BP: 133/72 (08-30-17 @ 05:54) (125/62 - 172/98)  RR: 18 (08-30-17 @ 05:54) (16 - 26)  SpO2: 95% (08-30-17 @ 05:54) (95% - 98%)  Wt(kg): --    I&O's Summary    29 Aug 2017 07:01  -  30 Aug 2017 07:00  --------------------------------------------------------  IN: 360 mL / OUT: 600 mL / NET: -240 mL  	  Lymphatic: No lymphadenopathy , no edema  Cardiovascular: Normal S1 S2, No JVD, No murmurs , Peripheral pulses palpable 2+ bilaterally  Respiratory: Lungs clear to auscultation, normal effort 	  Gastrointestinal:  Soft, Non-tender, + BS	  Skin: No rashes, No ecchymoses, No cyanosis, warm to touch  Musculoskeletal: Normal range of motion, normal strength  Psychiatry:  Mood & affect appropriate    TELEMETRY: 	  NSR  ECG:  	  RADIOLOGY:   DIAGNOSTIC TESTING:  [ ] Echocardiogram: < from: Transthoracic Echocardiogram (08.12.17 @ 07:13) >  CONCLUSIONS:  1. Normal mitral valve. Mild mitral regurgitation.  2. Calcified trileaflet aortic valve with normal opening.  3. Aortic Root: 4.0 cm.  4. Moderate left atrial enlargement.  5. Moderate concentric left ventricular hypertrophy.  6. Moderate global left ventricular dysfunction  (EF=40-45%).  7. Grade III diastolic dysfunction  8. Normal right atrium.  9. Normal right ventricular size and function.  10. RV systolic pressure is moderately increased (PASP 48mm  Hg).  11. There is mild tricuspid regurgitation.  12. There is mild pulmonic regurgitation.  13. Normal pericardium with no pericardial effusion.    < end of copied text >    [ ]  Catheterization:  [ ] Stress Test:    OTHER: 	        ASSESSMENT/PLAN: 	83y Male with h/o CAD s/p CABG, PAF, HTN, HLD admitted with exertional angina, abnormal NST, EF 40-45%, Cath with severe distal LM and ostial Lcx disease.  Post cath Groin Hematoma.  US negative for pseudoaneurysm.  s/p PCI TO LM on 8/25/17.  Patient presents with SOB Cough and fever.  On Abx for suspected PNA.      --Cont ASA PLAVIX, statin for recent MANNY   Eliquis 5 BID for AFIB .   ABX per medicine  rate control with BB,    GI / DVT prophylaxis.   keep K>4, mag >2.0   D/W Dr Ruiz

## 2017-08-30 NOTE — PROGRESS NOTE ADULT - ASSESSMENT
84 y/o w/ hx CAD s/p CABG, DM, A-fib on aspirin/plavix, recently admission w/ coronary stent on 8/15/17, discharged 4 days ago, reports cough and SOB admitted for sepsis and poss pna and uti . wbc 11.8, temp 101. tachycardia positive UA, no urine culture was not sent.    poss gram negative and gram positive pneumonia, poss UTI- start empiric zosyn.  follow up cultures. monitor temp. id consult called  afib- on eliquis as per cardio, rate is controlled on metoprolol  dm- fs qid, check hgb a1c, insulin sliding scale  cad- s/p coronary stent- cw asa and plavix

## 2017-08-31 LAB
ANION GAP SERPL CALC-SCNC: 12 MMOL/L — SIGNIFICANT CHANGE UP (ref 5–17)
BUN SERPL-MCNC: 16 MG/DL — SIGNIFICANT CHANGE UP (ref 7–23)
CALCIUM SERPL-MCNC: 9 MG/DL — SIGNIFICANT CHANGE UP (ref 8.4–10.5)
CHLORIDE SERPL-SCNC: 105 MMOL/L — SIGNIFICANT CHANGE UP (ref 96–108)
CO2 SERPL-SCNC: 24 MMOL/L — SIGNIFICANT CHANGE UP (ref 22–31)
CREAT SERPL-MCNC: 1.21 MG/DL — SIGNIFICANT CHANGE UP (ref 0.5–1.3)
CULTURE RESULTS: NO GROWTH — SIGNIFICANT CHANGE UP
GLUCOSE SERPL-MCNC: 98 MG/DL — SIGNIFICANT CHANGE UP (ref 70–99)
HCT VFR BLD CALC: 33.5 % — LOW (ref 39–50)
HGB BLD-MCNC: 11.1 G/DL — LOW (ref 13–17)
LEGIONELLA AG UR QL: NEGATIVE — SIGNIFICANT CHANGE UP
MCHC RBC-ENTMCNC: 30.9 PG — SIGNIFICANT CHANGE UP (ref 27–34)
MCHC RBC-ENTMCNC: 33 GM/DL — SIGNIFICANT CHANGE UP (ref 32–36)
MCV RBC AUTO: 93.8 FL — SIGNIFICANT CHANGE UP (ref 80–100)
PLATELET # BLD AUTO: 214 K/UL — SIGNIFICANT CHANGE UP (ref 150–400)
POTASSIUM SERPL-MCNC: 4.1 MMOL/L — SIGNIFICANT CHANGE UP (ref 3.5–5.3)
POTASSIUM SERPL-SCNC: 4.1 MMOL/L — SIGNIFICANT CHANGE UP (ref 3.5–5.3)
RBC # BLD: 3.57 M/UL — LOW (ref 4.2–5.8)
RBC # FLD: 13.3 % — SIGNIFICANT CHANGE UP (ref 10.3–14.5)
SODIUM SERPL-SCNC: 141 MMOL/L — SIGNIFICANT CHANGE UP (ref 135–145)
SPECIMEN SOURCE: SIGNIFICANT CHANGE UP
WBC # BLD: 8 K/UL — SIGNIFICANT CHANGE UP (ref 3.8–10.5)
WBC # FLD AUTO: 8 K/UL — SIGNIFICANT CHANGE UP (ref 3.8–10.5)

## 2017-08-31 PROCEDURE — 99232 SBSQ HOSP IP/OBS MODERATE 35: CPT

## 2017-08-31 RX ORDER — ZALEPLON 10 MG
5 CAPSULE ORAL ONCE
Qty: 0 | Refills: 0 | Status: DISCONTINUED | OUTPATIENT
Start: 2017-08-31 | End: 2017-08-31

## 2017-08-31 RX ADMIN — Medication 25 MILLIGRAM(S): at 18:05

## 2017-08-31 RX ADMIN — Medication 81 MILLIGRAM(S): at 12:29

## 2017-08-31 RX ADMIN — CLOPIDOGREL BISULFATE 75 MILLIGRAM(S): 75 TABLET, FILM COATED ORAL at 12:29

## 2017-08-31 RX ADMIN — PIPERACILLIN AND TAZOBACTAM 25 GRAM(S): 4; .5 INJECTION, POWDER, LYOPHILIZED, FOR SOLUTION INTRAVENOUS at 13:26

## 2017-08-31 RX ADMIN — Medication 5 MILLIGRAM(S): at 23:32

## 2017-08-31 RX ADMIN — PIPERACILLIN AND TAZOBACTAM 25 GRAM(S): 4; .5 INJECTION, POWDER, LYOPHILIZED, FOR SOLUTION INTRAVENOUS at 23:32

## 2017-08-31 RX ADMIN — APIXABAN 5 MILLIGRAM(S): 2.5 TABLET, FILM COATED ORAL at 10:28

## 2017-08-31 RX ADMIN — INSULIN GLARGINE 10 UNIT(S): 100 INJECTION, SOLUTION SUBCUTANEOUS at 23:32

## 2017-08-31 RX ADMIN — PIPERACILLIN AND TAZOBACTAM 25 GRAM(S): 4; .5 INJECTION, POWDER, LYOPHILIZED, FOR SOLUTION INTRAVENOUS at 05:29

## 2017-08-31 NOTE — PROGRESS NOTE ADULT - SUBJECTIVE AND OBJECTIVE BOX
Patient is a 83y old  Male who presents with a chief complaint of sob (29 Aug 2017 23:14)      SUBJECTIVE / OVERNIGHT EVENTS: feels much better, able to take deep breath.  walking around hallways without any distress.    MEDICATIONS  (STANDING):  insulin glargine Injectable (LANTUS) 10 Unit(s) SubCutaneous at bedtime  insulin lispro (HumaLOG) corrective regimen sliding scale   SubCutaneous at bedtime  dextrose 5%. 1000 milliLiter(s) (50 mL/Hr) IV Continuous <Continuous>  dextrose 50% Injectable 12.5 Gram(s) IV Push once  dextrose 50% Injectable 25 Gram(s) IV Push once  dextrose 50% Injectable 25 Gram(s) IV Push once  aspirin  chewable 81 milliGRAM(s) Oral daily  clopidogrel Tablet 75 milliGRAM(s) Oral daily  apixaban 5 milliGRAM(s) Oral every 12 hours  metoprolol 25 milliGRAM(s) Oral two times a day  piperacillin/tazobactam IVPB. 3.375 Gram(s) IV Intermittent every 8 hours    MEDICATIONS  (PRN):  dextrose Gel 1 Dose(s) Oral once PRN Blood Glucose LESS THAN 70 milliGRAM(s)/deciliter  glucagon  Injectable 1 milliGRAM(s) IntraMuscular once PRN Glucose LESS THAN 70 milligrams/deciliter      Vital Signs Last 24 Hrs  T(C): 36.8 (31 Aug 2017 04:39), Max: 36.9 (30 Aug 2017 21:20)  T(F): 98.2 (31 Aug 2017 04:39), Max: 98.5 (30 Aug 2017 21:20)  HR: 55 (31 Aug 2017 04:39) (55 - 64)  BP: 137/75 (31 Aug 2017 04:39) (121/75 - 137/75)  BP(mean): --  RR: 18 (31 Aug 2017 04:39) (18 - 18)  SpO2: 95% (31 Aug 2017 04:39) (95% - 96%)  CAPILLARY BLOOD GLUCOSE  107 (31 Aug 2017 07:53)  120 (30 Aug 2017 21:20)  116 (30 Aug 2017 16:57)  126 (30 Aug 2017 12:03)        I&O's Summary    30 Aug 2017 07:01  -  31 Aug 2017 07:00  --------------------------------------------------------  IN: 1160 mL / OUT: 1650 mL / NET: -490 mL        PHYSICAL EXAM:  GENERAL: NAD, well-developed  HEAD:  Atraumatic, Normocephalic  EYES: EOMI, PERRLA, conjunctiva and sclera clear  NECK: Supple, No JVD  CHEST/LUNG: Clear to auscultation bilaterally; No wheeze  HEART: Regular rate and rhythm; No murmurs, rubs, or gallops  ABDOMEN: Soft, Nontender, Nondistended; Bowel sounds present  EXTREMITIES:  2+ Peripheral Pulses, No clubbing, cyanosis, or edema  PSYCH: AAOx3  NEUROLOGY: non-focal  SKIN: No rashes or lesions    LABS:                        11.1   8.0   )-----------( 214      ( 31 Aug 2017 06:17 )             33.5     08-31    141  |  105  |  16  ----------------------------<  98  4.1   |  24  |  1.21    Ca    9.0      31 Aug 2017 06:17  Phos  3.6     08-30  Mg     2.2     08-30    TPro  8.0  /  Alb  4.0  /  TBili  0.6  /  DBili  x   /  AST  17  /  ALT  15  /  AlkPhos  82  08-29    PT/INR - ( 29 Aug 2017 12:20 )   PT: 13.0 sec;   INR: 1.20 ratio         PTT - ( 29 Aug 2017 12:20 )  PTT:30.9 sec  CARDIAC MARKERS ( 29 Aug 2017 12:20 )  x     / 0.02 ng/mL / x     / x     / 2.1 ng/mL      Urinalysis Basic - ( 29 Aug 2017 18:00 )    Color: Yellow / Appearance: x / S.026 / pH: x  Gluc: x / Ketone: Negative  / Bili: Negative / Urobili: Negative   Blood: x / Protein: 30 mg/dL / Nitrite: Negative   Leuk Esterase: Large / RBC: 0-2 /HPF / WBC >50 /HPF   Sq Epi: x / Non Sq Epi: OCC /HPF / Bacteria: x        RADIOLOGY & ADDITIONAL TESTS:    Imaging Personally Reviewed:    Consultant(s) Notes Reviewed:  cardio and ID    Care Discussed with Consultants/Other Providers:

## 2017-08-31 NOTE — PROGRESS NOTE ADULT - SUBJECTIVE AND OBJECTIVE BOX
CC: F/U for PNA    Saw/spoke to patient. States feels better today. Prior when breathing would have pain on inspiration, this has resolved. Cough improved. No fevers, no chills. Overall well.    Allergies  No Known Allergies    ANTIMICROBIALS:  piperacillin/tazobactam IVPB. 3.375 every 8 hours    OTHER MEDS:  insulin glargine Injectable (LANTUS) 10 Unit(s) SubCutaneous at bedtime  insulin lispro (HumaLOG) corrective regimen sliding scale   SubCutaneous at bedtime  dextrose 5%. 1000 milliLiter(s) IV Continuous <Continuous>  dextrose Gel 1 Dose(s) Oral once PRN  dextrose 50% Injectable 12.5 Gram(s) IV Push once  dextrose 50% Injectable 25 Gram(s) IV Push once  dextrose 50% Injectable 25 Gram(s) IV Push once  glucagon  Injectable 1 milliGRAM(s) IntraMuscular once PRN  aspirin  chewable 81 milliGRAM(s) Oral daily  clopidogrel Tablet 75 milliGRAM(s) Oral daily  apixaban 5 milliGRAM(s) Oral every 12 hours  metoprolol 25 milliGRAM(s) Oral two times a day    PE:    Vital Signs Last 24 Hrs  T(C): 37.1 (31 Aug 2017 12:01), Max: 37.1 (31 Aug 2017 12:01)  T(F): 98.8 (31 Aug 2017 12:01), Max: 98.8 (31 Aug 2017 12:01)  HR: 60 (31 Aug 2017 12:01) (55 - 64)  BP: 122/76 (31 Aug 2017 12:01) (121/75 - 137/75)  BP(mean): --  RR: 18 (31 Aug 2017 12:01) (18 - 18)  SpO2: 96% (31 Aug 2017 12:01) (95% - 96%)    Gen: AOx3, NAD, non-toxic, pleasant  CV: S1+S2 normal, no murmurs, nontachycardic  Resp: Clear bilat, no resp distress, no crackles/wheezes  Abd: Soft, nontender, +BS  Ext: No LE edema, no wounds    LABS:                        11.1   8.0   )-----------( 214      ( 31 Aug 2017 06:17 )             33.5     08-31    141  |  105  |  16  ----------------------------<  98  4.1   |  24  |  1.21    Ca    9.0      31 Aug 2017 06:17  Phos  3.6     -  Mg     2.2         TPro  8.0  /  Alb  4.0  /  TBili  0.6  /  DBili  x   /  AST  17  /  ALT  15  /  AlkPhos  82      Urinalysis Basic - ( 29 Aug 2017 18:00 )    Color: Yellow / Appearance: x / S.026 / pH: x  Gluc: x / Ketone: Negative  / Bili: Negative / Urobili: Negative   Blood: x / Protein: 30 mg/dL / Nitrite: Negative   Leuk Esterase: Large / RBC: 0-2 /HPF / WBC >50 /HPF   Sq Epi: x / Non Sq Epi: OCC /HPF / Bacteria: x    MICROBIOLOGY:     BCX x2 NGTD    Rapid RVP Result: Deyvi ( @ 12:42)    RADIOLOGY:    No new available

## 2017-08-31 NOTE — PROGRESS NOTE ADULT - SUBJECTIVE AND OBJECTIVE BOX
Subjective:  pt seen and examined , ROS neg     insulin glargine Injectable (LANTUS) 10 Unit(s) SubCutaneous at bedtime  insulin lispro (HumaLOG) corrective regimen sliding scale   SubCutaneous at bedtime  dextrose 5%. 1000 milliLiter(s) IV Continuous <Continuous>  dextrose Gel 1 Dose(s) Oral once PRN  dextrose 50% Injectable 12.5 Gram(s) IV Push once  dextrose 50% Injectable 25 Gram(s) IV Push once  dextrose 50% Injectable 25 Gram(s) IV Push once  glucagon  Injectable 1 milliGRAM(s) IntraMuscular once PRN  aspirin  chewable 81 milliGRAM(s) Oral daily  clopidogrel Tablet 75 milliGRAM(s) Oral daily  apixaban 5 milliGRAM(s) Oral every 12 hours  metoprolol 25 milliGRAM(s) Oral two times a day  piperacillin/tazobactam IVPB. 3.375 Gram(s) IV Intermittent every 8 hours                            11.1   8.0   )-----------( 214      ( 31 Aug 2017 06:17 )             33.5       Hemoglobin: 11.1 g/dL (08-31 @ 06:17)  Hemoglobin: 11.3 g/dL (08-30 @ 06:12)  Hemoglobin: 13.1 g/dL (08-29 @ 12:20)      08-31    141  |  105  |  16  ----------------------------<  98  4.1   |  24  |  1.21    Ca    9.0      31 Aug 2017 06:17  Phos  3.6     08-30  Mg     2.2     08-30    TPro  8.0  /  Alb  4.0  /  TBili  0.6  /  DBili  x   /  AST  17  /  ALT  15  /  AlkPhos  82  08-29    Creatinine Trend: 1.21<--, 1.11<--, 1.41<--, 1.12<--, 1.18<--, 1.14<--    COAGS:     CARDIAC MARKERS ( 29 Aug 2017 12:20 )  x     / 0.02 ng/mL / x     / x     / 2.1 ng/mL        T(C): 36.8 (08-31-17 @ 04:39), Max: 36.9 (08-30-17 @ 21:20)  HR: 55 (08-31-17 @ 04:39) (55 - 64)  BP: 137/75 (08-31-17 @ 04:39) (121/75 - 137/75)  RR: 18 (08-31-17 @ 04:39) (18 - 18)  SpO2: 95% (08-31-17 @ 04:39) (95% - 96%)  Wt(kg): --    I&O's Summary    30 Aug 2017 07:01  -  31 Aug 2017 07:00  --------------------------------------------------------  IN: 1160 mL / OUT: 1650 mL / NET: -490 mL          Lymphatic: No lymphadenopathy , no edema  Cardiovascular: Normal S1 S2, No JVD, No murmurs , Peripheral pulses palpable 2+ bilaterally  Respiratory: Lungs clear to auscultation, normal effort 	  Gastrointestinal:  Soft, Non-tender, + BS	  Skin: No rashes, No ecchymoses, No cyanosis, warm to touch  Musculoskeletal: Normal range of motion, normal strength  Psychiatry:  Mood & affect appropriate    TELEMETRY: 	  NSR  ECG:  	  RADIOLOGY:   DIAGNOSTIC TESTING:  [ ] Echocardiogram: < from: Transthoracic Echocardiogram (08.12.17 @ 07:13) >  CONCLUSIONS:  1. Normal mitral valve. Mild mitral regurgitation.  2. Calcified trileaflet aortic valve with normal opening.  3. Aortic Root: 4.0 cm.  4. Moderate left atrial enlargement.  5. Moderate concentric left ventricular hypertrophy.  6. Moderate global left ventricular dysfunction  (EF=40-45%).  7. Grade III diastolic dysfunction  8. Normal right atrium.  9. Normal right ventricular size and function.  10. RV systolic pressure is moderately increased (PASP 48mm  Hg).  11. There is mild tricuspid regurgitation.  12. There is mild pulmonic regurgitation.  13. Normal pericardium with no pericardial effusion.    < end of copied text >    [ ]  Catheterization:  [ ] Stress Test:    OTHER: 	        ASSESSMENT/PLAN: 	83y Male with h/o CAD s/p CABG, PAF, HTN, HLD admitted with exertional angina, abnormal NST, EF 40-45%, Cath with severe distal LM and ostial Lcx disease.  Post cath Groin Hematoma.  US negative for pseudoaneurysm.  s/p PCI TO LM on 8/25/17.  Patient presents with SOB Cough and fever.  On Abx for suspected PNA.      --Cont ASA PLAVIX, statin for recent MANNY   Eliquis 5 BID for AFIB .   ABX per medicine-   trend wbc,  f/u cultures/   rate control with BB,    GI  prophylaxis.   keep K>4, mag >2.0   D/W Dr Ruiz Subjective:  pt seen and examined , ROS neg , feels much better    insulin glargine Injectable (LANTUS) 10 Unit(s) SubCutaneous at bedtime  insulin lispro (HumaLOG) corrective regimen sliding scale   SubCutaneous at bedtime  dextrose 5%. 1000 milliLiter(s) IV Continuous <Continuous>  dextrose Gel 1 Dose(s) Oral once PRN  dextrose 50% Injectable 12.5 Gram(s) IV Push once  dextrose 50% Injectable 25 Gram(s) IV Push once  dextrose 50% Injectable 25 Gram(s) IV Push once  glucagon  Injectable 1 milliGRAM(s) IntraMuscular once PRN  aspirin  chewable 81 milliGRAM(s) Oral daily  clopidogrel Tablet 75 milliGRAM(s) Oral daily  apixaban 5 milliGRAM(s) Oral every 12 hours  metoprolol 25 milliGRAM(s) Oral two times a day  piperacillin/tazobactam IVPB. 3.375 Gram(s) IV Intermittent every 8 hours                            11.1   8.0   )-----------( 214      ( 31 Aug 2017 06:17 )             33.5       Hemoglobin: 11.1 g/dL (08-31 @ 06:17)  Hemoglobin: 11.3 g/dL (08-30 @ 06:12)  Hemoglobin: 13.1 g/dL (08-29 @ 12:20)      08-31    141  |  105  |  16  ----------------------------<  98  4.1   |  24  |  1.21    Ca    9.0      31 Aug 2017 06:17  Phos  3.6     08-30  Mg     2.2     08-30    TPro  8.0  /  Alb  4.0  /  TBili  0.6  /  DBili  x   /  AST  17  /  ALT  15  /  AlkPhos  82  08-29    Creatinine Trend: 1.21<--, 1.11<--, 1.41<--, 1.12<--, 1.18<--, 1.14<--    COAGS:     CARDIAC MARKERS ( 29 Aug 2017 12:20 )  x     / 0.02 ng/mL / x     / x     / 2.1 ng/mL        T(C): 36.8 (08-31-17 @ 04:39), Max: 36.9 (08-30-17 @ 21:20)  HR: 55 (08-31-17 @ 04:39) (55 - 64)  BP: 137/75 (08-31-17 @ 04:39) (121/75 - 137/75)  RR: 18 (08-31-17 @ 04:39) (18 - 18)  SpO2: 95% (08-31-17 @ 04:39) (95% - 96%)  Wt(kg): --    I&O's Summary    30 Aug 2017 07:01  -  31 Aug 2017 07:00  --------------------------------------------------------  IN: 1160 mL / OUT: 1650 mL / NET: -490 mL          Lymphatic: No lymphadenopathy , no edema  Cardiovascular: Normal S1 S2, No JVD, No murmurs , Peripheral pulses palpable 2+ bilaterally  Respiratory: Lungs clear to auscultation, normal effort 	  Gastrointestinal:  Soft, Non-tender, + BS	  Skin: No rashes, No ecchymoses, No cyanosis, warm to touch  Musculoskeletal: Normal range of motion, normal strength  Psychiatry:  Mood & affect appropriate    TELEMETRY: 	  NSR  ECG:  	  RADIOLOGY:   DIAGNOSTIC TESTING:  [ ] Echocardiogram: < from: Transthoracic Echocardiogram (08.12.17 @ 07:13) >  CONCLUSIONS:  1. Normal mitral valve. Mild mitral regurgitation.  2. Calcified trileaflet aortic valve with normal opening.  3. Aortic Root: 4.0 cm.  4. Moderate left atrial enlargement.  5. Moderate concentric left ventricular hypertrophy.  6. Moderate global left ventricular dysfunction  (EF=40-45%).  7. Grade III diastolic dysfunction  8. Normal right atrium.  9. Normal right ventricular size and function.  10. RV systolic pressure is moderately increased (PASP 48mm  Hg).  11. There is mild tricuspid regurgitation.  12. There is mild pulmonic regurgitation.  13. Normal pericardium with no pericardial effusion.    < end of copied text >    [ ]  Catheterization:  [ ] Stress Test:    OTHER: 	        ASSESSMENT/PLAN: 	83y Male with h/o CAD s/p CABG, PAF, HTN, HLD admitted with exertional angina, abnormal NST, EF 40-45%, Cath with severe distal LM and ostial Lcx disease.  Post cath Groin Hematoma.  US negative for pseudoaneurysm.  s/p PCI TO LM on 8/25/17.  Patient presents with SOB Cough and fever.  On Abx for suspected PNA.      --Cont ASA PLAVIX, statin for recent MANNY   Eliquis 5 BID for AFIB .   ABX per medicine-   trend wbc,  f/u cultures/   rate control with BB,    GI  prophylaxis.   keep K>4, mag >2.0   D/W Dr Ruiz

## 2017-08-31 NOTE — PROGRESS NOTE ADULT - ASSESSMENT
82 y/o w/ hx CAD s/p CABG, DM, A-fib on aspirin/plavix, recently admission w/ coronary stent on 8/15/17, discharged 4 days ago, reports cough and SOB admitted for sepsis and poss pna and uti . wbc 11.8, temp 101. tachycardia positive UA, no urine culture was not sent.    poss gram negative and gram positive pneumonia, poss UTI- on zosyn.  follow up cultures. monitor temp. id consult agreed with our chice od abx.  change to oral a per ID  afib- on eliquis as per cardio, rate is controlled on metoprolol  dm- fs qid, check hgb a1c, insulin sliding scale  cad- s/p coronary stent- cw asa and plavix  dc planning

## 2017-08-31 NOTE — PROGRESS NOTE ADULT - ASSESSMENT
84 yo M with CAD-CABG, DM, obesity, A Fib with shortness of breath. In ED, patient noted to have fever to 101F, tachycardia. Only complaint is shortness of breath and cough. No dysuria, no pyuria, no abd pain, no diarrhea, no pain at prior IV site. Recent cath to R radial--no signs infection. UA positive, but no urinary symptoms. CXR equivocal for pneumonia. Given picture of sepsis would presume is 2/2 to pneumonia based on clinical presentation. Seems improving on zosyn, plan to give abx course for presumed HCAP. Doing well.  - Zosyn 3.375g q 8  - F/U BCXs  - Check Sputum culture, check urine legionella      Issac Smiley MD  Pager [phone]  After 5pm and on weekends call [phone]

## 2017-09-01 LAB
ANION GAP SERPL CALC-SCNC: 14 MMOL/L — SIGNIFICANT CHANGE UP (ref 5–17)
BUN SERPL-MCNC: 16 MG/DL — SIGNIFICANT CHANGE UP (ref 7–23)
CALCIUM SERPL-MCNC: 9.5 MG/DL — SIGNIFICANT CHANGE UP (ref 8.4–10.5)
CHLORIDE SERPL-SCNC: 104 MMOL/L — SIGNIFICANT CHANGE UP (ref 96–108)
CO2 SERPL-SCNC: 23 MMOL/L — SIGNIFICANT CHANGE UP (ref 22–31)
CREAT SERPL-MCNC: 1.46 MG/DL — HIGH (ref 0.5–1.3)
GLUCOSE SERPL-MCNC: 111 MG/DL — HIGH (ref 70–99)
HCT VFR BLD CALC: 35.8 % — LOW (ref 39–50)
HGB BLD-MCNC: 11.9 G/DL — LOW (ref 13–17)
MCHC RBC-ENTMCNC: 31.1 PG — SIGNIFICANT CHANGE UP (ref 27–34)
MCHC RBC-ENTMCNC: 33.3 GM/DL — SIGNIFICANT CHANGE UP (ref 32–36)
MCV RBC AUTO: 93.4 FL — SIGNIFICANT CHANGE UP (ref 80–100)
PLATELET # BLD AUTO: 236 K/UL — SIGNIFICANT CHANGE UP (ref 150–400)
POTASSIUM SERPL-MCNC: 4.2 MMOL/L — SIGNIFICANT CHANGE UP (ref 3.5–5.3)
POTASSIUM SERPL-SCNC: 4.2 MMOL/L — SIGNIFICANT CHANGE UP (ref 3.5–5.3)
RBC # BLD: 3.84 M/UL — LOW (ref 4.2–5.8)
RBC # FLD: 13.2 % — SIGNIFICANT CHANGE UP (ref 10.3–14.5)
SODIUM SERPL-SCNC: 141 MMOL/L — SIGNIFICANT CHANGE UP (ref 135–145)
WBC # BLD: 8.2 K/UL — SIGNIFICANT CHANGE UP (ref 3.8–10.5)
WBC # FLD AUTO: 8.2 K/UL — SIGNIFICANT CHANGE UP (ref 3.8–10.5)

## 2017-09-01 PROCEDURE — 99232 SBSQ HOSP IP/OBS MODERATE 35: CPT

## 2017-09-01 RX ADMIN — PIPERACILLIN AND TAZOBACTAM 25 GRAM(S): 4; .5 INJECTION, POWDER, LYOPHILIZED, FOR SOLUTION INTRAVENOUS at 22:09

## 2017-09-01 RX ADMIN — PIPERACILLIN AND TAZOBACTAM 25 GRAM(S): 4; .5 INJECTION, POWDER, LYOPHILIZED, FOR SOLUTION INTRAVENOUS at 06:27

## 2017-09-01 RX ADMIN — Medication 81 MILLIGRAM(S): at 13:38

## 2017-09-01 RX ADMIN — INSULIN GLARGINE 10 UNIT(S): 100 INJECTION, SOLUTION SUBCUTANEOUS at 22:09

## 2017-09-01 RX ADMIN — APIXABAN 5 MILLIGRAM(S): 2.5 TABLET, FILM COATED ORAL at 22:09

## 2017-09-01 RX ADMIN — Medication 25 MILLIGRAM(S): at 19:15

## 2017-09-01 RX ADMIN — CLOPIDOGREL BISULFATE 75 MILLIGRAM(S): 75 TABLET, FILM COATED ORAL at 13:38

## 2017-09-01 RX ADMIN — PIPERACILLIN AND TAZOBACTAM 25 GRAM(S): 4; .5 INJECTION, POWDER, LYOPHILIZED, FOR SOLUTION INTRAVENOUS at 13:39

## 2017-09-01 RX ADMIN — APIXABAN 5 MILLIGRAM(S): 2.5 TABLET, FILM COATED ORAL at 00:15

## 2017-09-01 RX ADMIN — APIXABAN 5 MILLIGRAM(S): 2.5 TABLET, FILM COATED ORAL at 13:38

## 2017-09-01 NOTE — PROGRESS NOTE ADULT - SUBJECTIVE AND OBJECTIVE BOX
Patient is a 83y old  Male who presents with a chief complaint of sob (29 Aug 2017 23:14)      SUBJECTIVE / OVERNIGHT EVENTS: Patient is a 83y old  Male who presents with a chief complaint of sob (29 Aug 2017 23:14)      SUBJECTIVE / OVERNIGHT EVENTS: No chest pain. No shortness of breath. No complaints. No events overnight.      MEDICATIONS  (STANDING):  insulin glargine Injectable (LANTUS) 10 Unit(s) SubCutaneous at bedtime  insulin lispro (HumaLOG) corrective regimen sliding scale   SubCutaneous at bedtime  dextrose 5%. 1000 milliLiter(s) (50 mL/Hr) IV Continuous <Continuous>  dextrose 50% Injectable 12.5 Gram(s) IV Push once  dextrose 50% Injectable 25 Gram(s) IV Push once  dextrose 50% Injectable 25 Gram(s) IV Push once  aspirin  chewable 81 milliGRAM(s) Oral daily  clopidogrel Tablet 75 milliGRAM(s) Oral daily  apixaban 5 milliGRAM(s) Oral every 12 hours  metoprolol 25 milliGRAM(s) Oral two times a day  piperacillin/tazobactam IVPB. 3.375 Gram(s) IV Intermittent every 8 hours    MEDICATIONS  (PRN):  dextrose Gel 1 Dose(s) Oral once PRN Blood Glucose LESS THAN 70 milliGRAM(s)/deciliter  glucagon  Injectable 1 milliGRAM(s) IntraMuscular once PRN Glucose LESS THAN 70 milligrams/deciliter      Vital Signs Last 24 Hrs  T(C): 36.4 (01 Sep 2017 12:27), Max: 37 (31 Aug 2017 16:15)  T(F): 97.6 (01 Sep 2017 12:27), Max: 98.6 (31 Aug 2017 16:15)  HR: 69 (01 Sep 2017 12:27) (54 - 69)  BP: 151/89 (01 Sep 2017 12:27) (120/70 - 159/79)  BP(mean): --  RR: 18 (01 Sep 2017 12:27) (16 - 18)  SpO2: 96% (01 Sep 2017 12:27) (94% - 98%)  CAPILLARY BLOOD GLUCOSE  131 (01 Sep 2017 12:54)  109 (01 Sep 2017 08:47)  115 (01 Sep 2017 04:55)  109 (31 Aug 2017 21:57)  110 (31 Aug 2017 17:51)        I&O's Summary    31 Aug 2017 07:01  -  01 Sep 2017 07:00  --------------------------------------------------------  IN: 560 mL / OUT: 1575 mL / NET: -1015 mL    01 Sep 2017 07:01  -  01 Sep 2017 14:23  --------------------------------------------------------  IN: 240 mL / OUT: 300 mL / NET: -60 mL        PHYSICAL EXAM:  GENERAL: NAD, well-developed  HEAD:  Atraumatic, Normocephalic  EYES: EOMI, PERRLA, conjunctiva and sclera clear  NECK: Supple, No JVD  CHEST/LUNG: Clear to auscultation bilaterally; No wheeze  HEART: Regular rate and rhythm; No murmurs, rubs, or gallops  ABDOMEN: Soft, Nontender, Nondistended; Bowel sounds present  EXTREMITIES:  2+ Peripheral Pulses, No clubbing, cyanosis, or edema  PSYCH: AAOx3  NEUROLOGY: non-focal  SKIN: No rashes or lesions    LABS:                        11.9   8.2   )-----------( 236      ( 01 Sep 2017 06:31 )             35.8     09-01    141  |  104  |  16  ----------------------------<  111<H>  4.2   |  23  |  1.46<H>    Ca    9.5      01 Sep 2017 06:31                RADIOLOGY & ADDITIONAL TESTS:    Imaging Personally Reviewed:    Consultant(s) Notes Reviewed:      Care Discussed with Consultants/Other Providers:      MEDICATIONS  (STANDING):  insulin glargine Injectable (LANTUS) 10 Unit(s) SubCutaneous at bedtime  insulin lispro (HumaLOG) corrective regimen sliding scale   SubCutaneous at bedtime  dextrose 5%. 1000 milliLiter(s) (50 mL/Hr) IV Continuous <Continuous>  dextrose 50% Injectable 12.5 Gram(s) IV Push once  dextrose 50% Injectable 25 Gram(s) IV Push once  dextrose 50% Injectable 25 Gram(s) IV Push once  aspirin  chewable 81 milliGRAM(s) Oral daily  clopidogrel Tablet 75 milliGRAM(s) Oral daily  apixaban 5 milliGRAM(s) Oral every 12 hours  metoprolol 25 milliGRAM(s) Oral two times a day  piperacillin/tazobactam IVPB. 3.375 Gram(s) IV Intermittent every 8 hours    MEDICATIONS  (PRN):  dextrose Gel 1 Dose(s) Oral once PRN Blood Glucose LESS THAN 70 milliGRAM(s)/deciliter  glucagon  Injectable 1 milliGRAM(s) IntraMuscular once PRN Glucose LESS THAN 70 milligrams/deciliter      Vital Signs Last 24 Hrs  T(C): 36.4 (01 Sep 2017 12:27), Max: 37 (31 Aug 2017 16:15)  T(F): 97.6 (01 Sep 2017 12:27), Max: 98.6 (31 Aug 2017 16:15)  HR: 69 (01 Sep 2017 12:27) (54 - 69)  BP: 151/89 (01 Sep 2017 12:27) (120/70 - 159/79)  BP(mean): --  RR: 18 (01 Sep 2017 12:27) (16 - 18)  SpO2: 96% (01 Sep 2017 12:27) (94% - 98%)  CAPILLARY BLOOD GLUCOSE  131 (01 Sep 2017 12:54)  109 (01 Sep 2017 08:47)  115 (01 Sep 2017 04:55)  109 (31 Aug 2017 21:57)  110 (31 Aug 2017 17:51)        I&O's Summary    31 Aug 2017 07:01  -  01 Sep 2017 07:00  --------------------------------------------------------  IN: 560 mL / OUT: 1575 mL / NET: -1015 mL    01 Sep 2017 07:01  -  01 Sep 2017 14:23  --------------------------------------------------------  IN: 240 mL / OUT: 300 mL / NET: -60 mL        PHYSICAL EXAM:  GENERAL: NAD, well-developed  HEAD:  Atraumatic, Normocephalic  EYES: EOMI, PERRLA, conjunctiva and sclera clear  NECK: Supple, No JVD  CHEST/LUNG: Clear to auscultation bilaterally; No wheeze  HEART: Regular rate and rhythm; No murmurs, rubs, or gallops  ABDOMEN: Soft, Nontender, Nondistended; Bowel sounds present  EXTREMITIES:  2+ Peripheral Pulses, No clubbing, cyanosis, or edema  PSYCH: AAOx3  NEUROLOGY: non-focal  SKIN: No rashes or lesions    LABS:                        11.9   8.2   )-----------( 236      ( 01 Sep 2017 06:31 )             35.8     09-01    141  |  104  |  16  ----------------------------<  111<H>  4.2   |  23  |  1.46<H>    Ca    9.5      01 Sep 2017 06:31                RADIOLOGY & ADDITIONAL TESTS:    Imaging Personally Reviewed:    Consultant(s) Notes Reviewed:      Care Discussed with Consultants/Other Providers:

## 2017-09-01 NOTE — CHART NOTE - NSCHARTNOTEFT_GEN_A_CORE
Pt was seen and examined.  Briefly this is a elderly male c hx HTN DM recent cardiac stent pw PNA  CKD stage 3 at baseline    Suggest  Simple supportive care. No need for renal sono  Once pna is finished no renal objection to cardiac cath        Sayed Yoon  [facility]

## 2017-09-01 NOTE — PROGRESS NOTE ADULT - SUBJECTIVE AND OBJECTIVE BOX
Subjective:  No CP/SOB improved      MEDICATIONS  (STANDING):  insulin glargine Injectable (LANTUS) 10 Unit(s) SubCutaneous at bedtime  insulin lispro (HumaLOG) corrective regimen sliding scale   SubCutaneous at bedtime  aspirin  chewable 81 milliGRAM(s) Oral daily  clopidogrel Tablet 75 milliGRAM(s) Oral daily  apixaban 5 milliGRAM(s) Oral every 12 hours  metoprolol 25 milliGRAM(s) Oral two times a day  piperacillin/tazobactam IVPB. 3.375 Gram(s) IV Intermittent every 8 hours    MEDICATIONS  (PRN):  dextrose Gel 1 Dose(s) Oral once PRN Blood Glucose LESS THAN 70 milliGRAM(s)/deciliter  glucagon  Injectable 1 milliGRAM(s) IntraMuscular once PRN Glucose LESS THAN 70 milligrams/deciliter      LABS:                        11.9   8.2   )-----------( 236      ( 01 Sep 2017 06:31 )             35.8     Hemoglobin: 11.9 g/dL (09-01 @ 06:31)  Hemoglobin: 11.1 g/dL (08-31 @ 06:17)  Hemoglobin: 11.3 g/dL (08-30 @ 06:12)  Hemoglobin: 13.1 g/dL (08-29 @ 12:20)    09-01    141  |  104  |  16  ----------------------------<  111<H>  4.2   |  23  |  1.46<H>    Ca    9.5      01 Sep 2017 06:31    Creatinine Trend: 1.46<--, 1.21<--, 1.11<--, 1.41<--, 1.12<--, 1.18<--     PHYSICAL EXAM  Vital Signs Last 24 Hrs  T(C): 36.4 (01 Sep 2017 12:27), Max: 37 (31 Aug 2017 16:15)  T(F): 97.6 (01 Sep 2017 12:27), Max: 98.6 (31 Aug 2017 16:15)  HR: 69 (01 Sep 2017 12:27) (54 - 69)  BP: 151/89 (01 Sep 2017 12:27) (120/70 - 159/79)  RR: 18 (01 Sep 2017 12:27) (16 - 18)  SpO2: 96% (01 Sep 2017 12:27) (94% - 98%)    Lymphatic: No lymphadenopathy , no edema  Cardiovascular: Normal S1 S2, No JVD, No murmurs , Peripheral pulses palpable 2+ bilaterally  Respiratory: Lungs clear to auscultation, normal effort 	  Gastrointestinal:  Soft, Non-tender, + BS	  Skin: No rashes, No ecchymoses, No cyanosis, warm to touch    TELEMETRY: 	  Sinus Bradycardia    DIAGNOSTIC TESTING:    < from: TTE with Doppler (w/Cont) (08.30.17 @ 17:10) >  EF (Visual Estimate): 50-55 %  ------------------------------------------------------------------------  Observations:  Mitral Valve: Mitral annular calcification. Minimal mitral  regurgitation.  Aortic Valve/Aorta: Calcified trileaflet aortic valve with  normal opening.  Normal aortic root size. (Ao: 3.7 cm at the sinuses of  Valsalva).  Left Atrium: Severely dilated left atrium.  LA volume index  = 50 cc/m2.  Left Ventricle: Endocardial visualization enhanced with  intravenous injection of echo contrast (Definity).  Paradoxical septal motion consistent with prior sternotomy.  Low normal systolic function.  Mild concentric left  ventricular hypertrophy. Moderate diastolic dysfunction  (Stage II).  Right Heart: Right atrial enlargement. The right ventricle  is not well visualized; grossly dilated with mildly  decreased right ventricular systolic function. TAPSE 1.5  cm.  Normal tricuspid valve. Moderate tricuspid  regurgitation. Normal pulmonic valve. Mild-moderate  pulmonic regurgitation.  Pericardium/Pleura: No pericardial effusion seen.  Hemodynamic: Estimated right atrial pressure is 8 mm Hg.  Estimated right ventricular systolic pressure equals 49 mm  Hg,assuming right atrial pressure equals 8 mm Hg,  consistent with mild pulmonary hypertension.  ------------------------------------------------------------------------  Conclusions:  1. Mild concentric left ventricular hypertrophy.  2. Endocardial visualization enhanced with intravenous  injection of echo contrast (Definity). Paradoxical septal  motion consistent with prior sternotomy. Low normal  systolic function.  3. Moderate diastolic dysfunction (Stage II).  4. The right ventricle is not wellvisualized; grossly  dilated with mildly decreased right ventricular systolic  function. TAPSE 1.5 cm.  5. No pericardial effusion seen.  ***Technically difficult study.  ------------------------------------------------------------------------  Confirmed on  8/30/2017 - 09:49:51 by Facundo Fox M.D.    < end of copied text >  	    ASSESSMENT/PLAN: 	83y Male with h/o CAD s/p CABG, PAF, HTN, HLD admitted with exertional angina, abnormal NST, EF 40-45%, Cath with severe distal LM and ostial Lcx disease.  Post cath Groin Hematoma.  US negative for pseudoaneurysm.  s/p PCI TO LM on 8/25/17.  Patient presents with SOB Cough and fever.  On Abx for suspected PNA.      --Cont ASA, PLAVIX, statin for recent MANNY  --Cont Eliquis 5 BID for AFIB .   --ABX per medicine   --f/u Dr Graff on Thursday, Sept 7 , in [place] office, at 2:00PM    Vivienne Abernathy PA-C  [facility]  [address], [address]   [place] [address]  office [phone]  pager [phone]

## 2017-09-01 NOTE — PROVIDER CONTACT NOTE (OTHER) - ACTION/TREATMENT ORDERED:
NP notified and aware.  No new orders made at this time.  Continue to monitor patient and maintain safety

## 2017-09-01 NOTE — PROVIDER CONTACT NOTE (OTHER) - ASSESSMENT
A&O x4.  HR 54.  /72.  RR 16.  96% O2 saturation on room air.  Patient denies pain/discomfort.  Patient asymptomatic and asleep during time of event

## 2017-09-01 NOTE — PROGRESS NOTE ADULT - SUBJECTIVE AND OBJECTIVE BOX
CC: F/U for HCAP    Saw/spoke to patient. No fevers, no chills. No other complaints. No cough/SOB. Feels markedly improved.    Allergies  No Known Allergies    ANTIMICROBIALS:  piperacillin/tazobactam IVPB. 3.375 every 8 hours    PE:    Vital Signs Last 24 Hrs  T(C): 36.4 (01 Sep 2017 12:27), Max: 36.8 (31 Aug 2017 20:57)  T(F): 97.6 (01 Sep 2017 12:27), Max: 98.3 (31 Aug 2017 20:57)  HR: 69 (01 Sep 2017 12:27) (54 - 69)  BP: 151/89 (01 Sep 2017 12:27) (132/72 - 159/79)  BP(mean): --  RR: 18 (01 Sep 2017 12:27) (16 - 18)  SpO2: 96% (01 Sep 2017 12:27) (96% - 98%)    Gen: AOx3, NAD, non-toxic, pleasant  CV: S1+S2 normal, no murmurs, nontachycardic  Resp: Clear bilat, no resp distress, no crackles/wheezes  Abd: Soft, nontender, +BS  Ext: No LE edema, no wounds; prior R radial cath site unchanged    LABS:                        11.9   8.2   )-----------( 236      ( 01 Sep 2017 06:31 )             35.8     09-01    141  |  104  |  16  ----------------------------<  111<H>  4.2   |  23  |  1.46<H>    Ca    9.5      01 Sep 2017 06:31    MICROBIOLOGY:    8/31 Legionella Neg  8/31 UCX Neg  8/29 BCX x2 NGTD    Rapid RVP Result: Deyvi (08-29 @ 12:42)    RADIOLOGY:    No new available

## 2017-09-01 NOTE — PROGRESS NOTE ADULT - ASSESSMENT
84 yo M with CAD-CABG, DM, obesity, A Fib with shortness of breath. In ED, patient noted to have fever to 101F, tachycardia. Only complaint is shortness of breath and cough. No dysuria, no pyuria, no abd pain, no diarrhea, no pain at prior IV site. Recent cath to R radial--no signs infection. UA positive, but no urinary symptoms. CXR equivocal for pneumonia. Given picture of sepsis would presume is 2/2 to pneumonia based on clinical presentation. Seems improving on zosyn, plan to give abx course for presumed HCAP. Doing well.  - Zosyn 3.375g q 8  - Would continue Zosyn through 9/2/17, then can change to PO antibiotics (see below) to complete total 7 days through 9/4/17  - Note that EKG with , would ask Cardiology if objection to Levaquin 750mg q 24 as PO agent (If cardiology objection, then can DC on Augmentin 875mg po BID)  - Will sign off. Please call with further questions or change in status.      Issac Smiley MD  Pager [phone]  After 5pm and on weekends call [phone]

## 2017-09-01 NOTE — PROGRESS NOTE ADULT - ASSESSMENT
82 y/o w/ hx CAD s/p CABG, DM, A-fib on aspirin/plavix, recently admission w/ coronary stent on 8/15/17, discharged 4 days ago, reports cough and SOB admitted for sepsis and poss pna and uti . wbc 11.8, temp 101. tachycardia positive UA, no urine culture was not sent.    poss gram negative and gram positive pneumonia, poss UTI- on zosyn.  follow up cultures. monitor temp. id consult agreed with our chice od abx.  change to oral a per ID. cont zosyn till foreign then change to po levaquin.    afib- on eliquis as per cardio, rate is controlled on metoprolol  dm- fs qid, check hgb a1c, insulin sliding scale  cad- s/p coronary stent- cw asa and plavix  dc planning

## 2017-09-02 ENCOUNTER — TRANSCRIPTION ENCOUNTER (OUTPATIENT)
Age: 82
End: 2017-09-02

## 2017-09-02 VITALS
DIASTOLIC BLOOD PRESSURE: 65 MMHG | HEART RATE: 53 BPM | SYSTOLIC BLOOD PRESSURE: 106 MMHG | TEMPERATURE: 98 F | RESPIRATION RATE: 95 BRPM

## 2017-09-02 PROCEDURE — 85027 COMPLETE CBC AUTOMATED: CPT

## 2017-09-02 PROCEDURE — 86901 BLOOD TYPING SEROLOGIC RH(D): CPT

## 2017-09-02 PROCEDURE — 82746 ASSAY OF FOLIC ACID SERUM: CPT

## 2017-09-02 PROCEDURE — 84132 ASSAY OF SERUM POTASSIUM: CPT

## 2017-09-02 PROCEDURE — 87449 NOS EACH ORGANISM AG IA: CPT

## 2017-09-02 PROCEDURE — 86900 BLOOD TYPING SEROLOGIC ABO: CPT

## 2017-09-02 PROCEDURE — 87086 URINE CULTURE/COLONY COUNT: CPT

## 2017-09-02 PROCEDURE — 84295 ASSAY OF SERUM SODIUM: CPT

## 2017-09-02 PROCEDURE — 80053 COMPREHEN METABOLIC PANEL: CPT

## 2017-09-02 PROCEDURE — 83605 ASSAY OF LACTIC ACID: CPT

## 2017-09-02 PROCEDURE — 96374 THER/PROPH/DIAG INJ IV PUSH: CPT

## 2017-09-02 PROCEDURE — 87798 DETECT AGENT NOS DNA AMP: CPT

## 2017-09-02 PROCEDURE — 83880 ASSAY OF NATRIURETIC PEPTIDE: CPT

## 2017-09-02 PROCEDURE — 85014 HEMATOCRIT: CPT

## 2017-09-02 PROCEDURE — C8929: CPT

## 2017-09-02 PROCEDURE — 87581 M.PNEUMON DNA AMP PROBE: CPT

## 2017-09-02 PROCEDURE — 83735 ASSAY OF MAGNESIUM: CPT

## 2017-09-02 PROCEDURE — 87633 RESP VIRUS 12-25 TARGETS: CPT

## 2017-09-02 PROCEDURE — 82435 ASSAY OF BLOOD CHLORIDE: CPT

## 2017-09-02 PROCEDURE — 84484 ASSAY OF TROPONIN QUANT: CPT

## 2017-09-02 PROCEDURE — 80048 BASIC METABOLIC PNL TOTAL CA: CPT

## 2017-09-02 PROCEDURE — 71045 X-RAY EXAM CHEST 1 VIEW: CPT

## 2017-09-02 PROCEDURE — 82947 ASSAY GLUCOSE BLOOD QUANT: CPT

## 2017-09-02 PROCEDURE — 93010 ELECTROCARDIOGRAM REPORT: CPT

## 2017-09-02 PROCEDURE — 84443 ASSAY THYROID STIM HORMONE: CPT

## 2017-09-02 PROCEDURE — 93005 ELECTROCARDIOGRAM TRACING: CPT

## 2017-09-02 PROCEDURE — 81001 URINALYSIS AUTO W/SCOPE: CPT

## 2017-09-02 PROCEDURE — 84100 ASSAY OF PHOSPHORUS: CPT

## 2017-09-02 PROCEDURE — 82553 CREATINE MB FRACTION: CPT

## 2017-09-02 PROCEDURE — 85730 THROMBOPLASTIN TIME PARTIAL: CPT

## 2017-09-02 PROCEDURE — 87486 CHLMYD PNEUM DNA AMP PROBE: CPT

## 2017-09-02 PROCEDURE — 83550 IRON BINDING TEST: CPT

## 2017-09-02 PROCEDURE — 82803 BLOOD GASES ANY COMBINATION: CPT

## 2017-09-02 PROCEDURE — 87040 BLOOD CULTURE FOR BACTERIA: CPT

## 2017-09-02 PROCEDURE — 86850 RBC ANTIBODY SCREEN: CPT

## 2017-09-02 PROCEDURE — 99285 EMERGENCY DEPT VISIT HI MDM: CPT | Mod: 25

## 2017-09-02 PROCEDURE — 85610 PROTHROMBIN TIME: CPT

## 2017-09-02 PROCEDURE — 82728 ASSAY OF FERRITIN: CPT

## 2017-09-02 PROCEDURE — 82330 ASSAY OF CALCIUM: CPT

## 2017-09-02 PROCEDURE — 82607 VITAMIN B-12: CPT

## 2017-09-02 PROCEDURE — 96375 TX/PRO/DX INJ NEW DRUG ADDON: CPT

## 2017-09-02 RX ORDER — APIXABAN 2.5 MG/1
1 TABLET, FILM COATED ORAL
Qty: 0 | Refills: 0 | COMMUNITY
Start: 2017-09-02

## 2017-09-02 RX ORDER — APIXABAN 2.5 MG/1
1 TABLET, FILM COATED ORAL
Qty: 60 | Refills: 0 | OUTPATIENT
Start: 2017-09-02 | End: 2017-10-02

## 2017-09-02 RX ADMIN — CLOPIDOGREL BISULFATE 75 MILLIGRAM(S): 75 TABLET, FILM COATED ORAL at 11:58

## 2017-09-02 RX ADMIN — Medication 25 MILLIGRAM(S): at 06:06

## 2017-09-02 RX ADMIN — PIPERACILLIN AND TAZOBACTAM 25 GRAM(S): 4; .5 INJECTION, POWDER, LYOPHILIZED, FOR SOLUTION INTRAVENOUS at 14:02

## 2017-09-02 RX ADMIN — APIXABAN 5 MILLIGRAM(S): 2.5 TABLET, FILM COATED ORAL at 11:58

## 2017-09-02 RX ADMIN — PIPERACILLIN AND TAZOBACTAM 25 GRAM(S): 4; .5 INJECTION, POWDER, LYOPHILIZED, FOR SOLUTION INTRAVENOUS at 06:06

## 2017-09-02 RX ADMIN — Medication 81 MILLIGRAM(S): at 11:58

## 2017-09-02 NOTE — PROGRESS NOTE ADULT - SUBJECTIVE AND OBJECTIVE BOX
Patient seen and examined sitting in bed; no new events.  Pt planned for discharge today.  NAD noted.    REVIEW OF SYSTEMS:  As per HPI, otherwise 8 full 10 ROS were unremarkable    MEDICATIONS  (STANDING):  insulin glargine Injectable (LANTUS) 10 Unit(s) SubCutaneous at bedtime  insulin lispro (HumaLOG) corrective regimen sliding scale   SubCutaneous at bedtime  dextrose 5%. 1000 milliLiter(s) (50 mL/Hr) IV Continuous <Continuous>  dextrose 50% Injectable 12.5 Gram(s) IV Push once  dextrose 50% Injectable 25 Gram(s) IV Push once  dextrose 50% Injectable 25 Gram(s) IV Push once  aspirin  chewable 81 milliGRAM(s) Oral daily  clopidogrel Tablet 75 milliGRAM(s) Oral daily  apixaban 5 milliGRAM(s) Oral every 12 hours  metoprolol 25 milliGRAM(s) Oral two times a day  piperacillin/tazobactam IVPB. 3.375 Gram(s) IV Intermittent every 8 hours      VITAL:  T(C): , Max: 36.7 (09-01-17 @ 21:26)  T(F): , Max: 98 (09-01-17 @ 21:26)  HR: 53 (09-02-17 @ 12:25)  BP: 106/65 (09-02-17 @ 12:25)  BP(mean): --  RR: 95 (09-02-17 @ 12:25)  SpO2: 98% (09-02-17 @ 04:30)  Wt(kg): --    I and O's:    09-01 @ 07:01  -  09-02 @ 07:00  --------------------------------------------------------  IN: 740 mL / OUT: 900 mL / NET: -160 mL    09-02 @ 07:01  -  09-02 @ 15:50  --------------------------------------------------------  IN: 600 mL / OUT: 500 mL / NET: 100 mL          PHYSICAL EXAM:    Constitutional: NAD  HEENT: PERRLA, EOMI,  MMM  Neck: No LAD, No JVD  Respiratory: diminished b/l  Cardiovascular: S1 and S2  Gastrointestinal: BS+, soft, NT/ND  Extremities: Trace l/e edema  Neurological: A/O x 3, no focal deficits  Psychiatric: Normal mood, normal affect  : No Rosenbaum  Skin: No rashes  Access: Not applicable    LABS:                        11.9   8.2   )-----------( 236      ( 01 Sep 2017 06:31 )             35.8     09-01    141  |  104  |  16  ----------------------------<  111<H>  4.2   |  23  |  1.46<H>    Ca    9.5      01 Sep 2017 06:31        ASSESSMENT AND PLAN:  This 83-year-old gentleman with past medical history of hypertension, diabetes, atrial fibrillation, coronary artery disease status post recent stent presents with shortness of breath.  Patient is currently being treated for pneumonic process.  The patient with acute-on-chronic renal failure based on AKIN's criteria for renal failure.  The patient has had a relatively nonproteinuric renal disease in the past.  There is no reason to suspect any carbon deposition disease.  Goals of therapy are nothing more than supportive in nature.  He has no obstructive issues.    1.  Renal:  For now simple supportive care.  No renal objections to ACE or ARB after the patient improves; no new labs this AM  2.  Cardiology: Patient remains on  Eliquis for non Doppler atrial fibrillation.    3.  ID:  The patient remains on IV antibiotics.      Discharge planning per primary team; no renal objection deemed renal fxn remains stable/better.  Have patient f/u with PCP in 1-2 weeks for repeat BMP

## 2017-09-02 NOTE — CHART NOTE - NSCHARTNOTEFT_GEN_A_CORE
-10:20am: Prior auth called for Eliquis to [phone] , ID #[id number] , spoke to Isidro, Ivania: afib, CAD with stent. Eliquis approved from 9/2 to 12/3/11761 Case # [id number]. [facility] made aware. co-pay $47.00      Ara Lyons NP [facility] [id number]

## 2017-09-02 NOTE — DISCHARGE NOTE ADULT - SECONDARY DIAGNOSIS.
Atrial fibrillation CAD (coronary artery disease) Type 2 diabetes mellitus HTN (hypertension) Macular degeneration

## 2017-09-02 NOTE — DISCHARGE NOTE ADULT - MEDICATION SUMMARY - MEDICATIONS TO TAKE
I will START or STAY ON the medications listed below when I get home from the hospital:    aspirin 81 mg oral delayed release tablet  -- 1 tab(s) by mouth once a day  -- Indication: For Protect from clot formation    apixaban 5 mg oral tablet  -- 1 tab(s) by mouth every 12 hours  -- Indication: For atrial fibrillation, protect from clot formation    Lantus Solostar Pen 100 units/mL subcutaneous solution  -- 10 unit(s) subcutaneous once a day (at bedtime)  -- Indication: For Type 2 Diabetes mellitus    clopidogrel 75 mg oral tablet  -- 1 tab(s) by mouth once a day  -- Indication: For Protect from clot formation due to coronary artery disease, with stent .     metoprolol tartrate 25 mg oral tablet  -- 1 tab(s) by mouth 2 times a day  -- Indication: For atrial fibrillation    PreserVision AREDS 2 oral capsule  -- 1 cap(s) by mouth 2 times a day  -- Indication: For macular degeneration I will START or STAY ON the medications listed below when I get home from the hospital:    aspirin 81 mg oral delayed release tablet  -- 1 tab(s) by mouth once a day  -- Indication: For Protect from clot formation    apixaban 5 mg oral tablet  -- 1 tab(s) by mouth every 12 hours  -- Indication: For Atrial fibrillation, coronary artery disease with stent    Lantus Solostar Pen 100 units/mL subcutaneous solution  -- 10 unit(s) subcutaneous once a day (at bedtime)  -- Indication: For Type 2 Diabetes mellitus    clopidogrel 75 mg oral tablet  -- 1 tab(s) by mouth once a day  -- Indication: For Protect from clot formation due to coronary artery disease, with stent .     metoprolol tartrate 25 mg oral tablet  -- 1 tab(s) by mouth 2 times a day  -- Indication: For atrial fibrillation    PreserVision AREDS 2 oral capsule  -- 1 cap(s) by mouth 2 times a day  -- Indication: For macular degeneration I will START or STAY ON the medications listed below when I get home from the hospital:    aspirin 81 mg oral delayed release tablet  -- 1 tab(s) by mouth once a day  -- Indication: For Protect from clot formation    apixaban 5 mg oral tablet  -- 1 tab(s) by mouth every 12 hours  -- Indication: For Atrial fibrillation, coronary artery disease with stent    Lantus Solostar Pen 100 units/mL subcutaneous solution  -- 10 unit(s) subcutaneous once a day (at bedtime)  -- Indication: For Type 2 Diabetes mellitus    clopidogrel 75 mg oral tablet  -- 1 tab(s) by mouth once a day  -- Indication: For Protect from clot formation due to coronary artery disease, with stent .     metoprolol tartrate 25 mg oral tablet  -- 1 tab(s) by mouth 2 times a day  -- Indication: For atrial fibrillation    Levaquin 750 mg oral tablet  -- 1 tab(s) by mouth every 24 hours MDD:thru Sunday, Sep 4th  -- Avoid prolonged or excessive exposure to direct and/or artificial sunlight while taking this medication.  Do not take dairy products, antacids, or iron preparations within one hour of this medication.  Finish all this medication unless otherwise directed by prescriber.  May cause drowsiness or dizziness.  Medication should be taken with plenty of water.    -- Indication: For Pneumonia    PreserVision AREDS 2 oral capsule  -- 1 cap(s) by mouth 2 times a day  -- Indication: For macular degeneration

## 2017-09-02 NOTE — PROGRESS NOTE ADULT - SUBJECTIVE AND OBJECTIVE BOX
Subjective:  pt seen and examined, no complaints on exam.     insulin glargine Injectable (LANTUS) 10 Unit(s) SubCutaneous at bedtime  insulin lispro (HumaLOG) corrective regimen sliding scale   SubCutaneous at bedtime  dextrose 5%. 1000 milliLiter(s) IV Continuous <Continuous>  dextrose Gel 1 Dose(s) Oral once PRN  dextrose 50% Injectable 12.5 Gram(s) IV Push once  dextrose 50% Injectable 25 Gram(s) IV Push once  dextrose 50% Injectable 25 Gram(s) IV Push once  glucagon  Injectable 1 milliGRAM(s) IntraMuscular once PRN  aspirin  chewable 81 milliGRAM(s) Oral daily  clopidogrel Tablet 75 milliGRAM(s) Oral daily  apixaban 5 milliGRAM(s) Oral every 12 hours  metoprolol 25 milliGRAM(s) Oral two times a day  piperacillin/tazobactam IVPB. 3.375 Gram(s) IV Intermittent every 8 hours                            11.9   8.2   )-----------( 236      ( 01 Sep 2017 06:31 )             35.8       Hemoglobin: 11.9 g/dL (09-01 @ 06:31)  Hemoglobin: 11.1 g/dL (08-31 @ 06:17)  Hemoglobin: 11.3 g/dL (08-30 @ 06:12)  Hemoglobin: 13.1 g/dL (08-29 @ 12:20)      09-01    141  |  104  |  16  ----------------------------<  111<H>  4.2   |  23  |  1.46<H>    Ca    9.5      01 Sep 2017 06:31      Creatinine Trend: 1.46<--, 1.21<--, 1.11<--, 1.41<--, 1.12<--, 1.18<--    COAGS:           T(C): 36.6 (09-02-17 @ 04:30), Max: 36.7 (09-01-17 @ 21:26)  HR: 68 (09-02-17 @ 04:30) (59 - 69)  BP: 132/68 (09-02-17 @ 04:30) (116/67 - 151/89)  RR: 20 (09-02-17 @ 04:30) (18 - 20)  SpO2: 98% (09-02-17 @ 04:30) (96% - 98%)  Wt(kg): --    I&O's Summary    31 Aug 2017 07:01  -  01 Sep 2017 07:00  --------------------------------------------------------  IN: 560 mL / OUT: 1575 mL / NET: -1015 mL    01 Sep 2017 07:01  -  02 Sep 2017 06:52  --------------------------------------------------------  IN: 640 mL / OUT: 900 mL / NET: -260 mL          Lymphatic: No lymphadenopathy , no edema  Cardiovascular: Normal S1 S2, No JVD, No murmurs , Peripheral pulses palpable 2+ bilaterally  Respiratory: Lungs clear to auscultation, normal effort 	  Gastrointestinal:  Soft, Non-tender, + BS	  Skin: No rashes, No ecchymoses, No cyanosis, warm to touch    TELEMETRY: 	  Sinus Bradycardia    DIAGNOSTIC TESTING:    < from: TTE with Doppler (w/Cont) (08.30.17 @ 17:10) >  EF (Visual Estimate): 50-55 %  ------------------------------------------------------------------------  Observations:  Mitral Valve: Mitral annular calcification. Minimal mitral  regurgitation.  Aortic Valve/Aorta: Calcified trileaflet aortic valve with  normal opening.  Normal aortic root size. (Ao: 3.7 cm at the sinuses of  Valsalva).  Left Atrium: Severely dilated left atrium.  LA volume index  = 50 cc/m2.  Left Ventricle: Endocardial visualization enhanced with  intravenous injection of echo contrast (Definity).  Paradoxical septal motion consistent with prior sternotomy.  Low normal systolic function.  Mild concentric left  ventricular hypertrophy. Moderate diastolic dysfunction  (Stage II).  Right Heart: Right atrial enlargement. The right ventricle  is not well visualized; grossly dilated with mildly  decreased right ventricular systolic function. TAPSE 1.5  cm.  Normal tricuspid valve. Moderate tricuspid  regurgitation. Normal pulmonic valve. Mild-moderate  pulmonic regurgitation.  Pericardium/Pleura: No pericardial effusion seen.  Hemodynamic: Estimated right atrial pressure is 8 mm Hg.  Estimated right ventricular systolic pressure equals 49 mm  Hg,assuming right atrial pressure equals 8 mm Hg,  consistent with mild pulmonary hypertension.  ------------------------------------------------------------------------  Conclusions:  1. Mild concentric left ventricular hypertrophy.  2. Endocardial visualization enhanced with intravenous  injection of echo contrast (Definity). Paradoxical septal  motion consistent with prior sternotomy. Low normal  systolic function.  3. Moderate diastolic dysfunction (Stage II).  4. The right ventricle is not wellvisualized; grossly  dilated with mildly decreased right ventricular systolic  function. TAPSE 1.5 cm.  5. No pericardial effusion seen.  ***Technically difficult study.  ------------------------------------------------------------------------  Confirmed on  8/30/2017 - 09:49:51 by Facundo Fox M.D.    < end of copied text >  	    ASSESSMENT/PLAN: 	83y Male with h/o CAD s/p CABG, PAF, HTN, HLD admitted with exertional angina, abnormal NST, EF 40-45%, Cath with severe distal LM and ostial Lcx disease.  Post cath Groin Hematoma.  US negative for pseudoaneurysm.  s/p PCI TO LM on 8/25/17.  Patient presents with SOB Cough and fever.  On Abx for suspected PNA.      --Cont ASA, PLAVIX, statin for recent MANNY  --Cont Eliquis 5 BID for AFIB .   --ABX per medicine   -- cont rate control with BB   --f/u Dr Graff on Thursday, Sept 7 , in [facility], at 2:00PM  D/W Dr Ruiz

## 2017-09-02 NOTE — PROGRESS NOTE ADULT - ASSESSMENT
82 y/o w/ hx CAD s/p CABG, DM, A-fib on aspirin/plavix, recently admission w/ coronary stent on 8/15/17, discharged 4 days ago, reports cough and SOB admitted for sepsis and poss pna and uti . wbc 11.8, temp 101. tachycardia positive UA, no urine culture was not sent.    poss gram negative and gram positive pneumonia, poss UTI- on zosyn.  follow up cultures. monitor temp. id consult agreed with our chice od abx.  change to oral after today. cont zosyn today then change to po levaquin 750 qd for 2 more days if ok with cardiology.    afib- on eliquis as per cardio, rate is controlled on metoprolol  dm- fs qid,  hgb a1c 6.4, insulin sliding scale  cad- s/p coronary stent- cw asa and plavix  dc planning

## 2017-09-02 NOTE — DISCHARGE NOTE ADULT - HOSPITAL COURSE
attending to complete 84 yo M with hx of CAD s/p CABG, DM Type II, a-fib on aspirin/ plavix p/w SOB since yesterday. Patient denies chest pain, worsening leg swelling.  -Sepsis-PNA  -UTI  08/30 UTI- ct zosyn  -ID: Given picture of sepsis would presume is 2/2 to pneumonia based on clinical presentation. Treat for HCAP considering recent admission. Pt overall improved since admission. - Zosyn 3.375g q 8. - F/U BCXs.  - Check Sputum culture, check urine legionella  - If continues to have fever would consider CT chest but can hold of for now  8/31: C/w Zosyn per ID   9/1: Creatinine trending upward. Pending ID recommendation for change from IV Dr Smiley to PO ABX. If change to PO ABX then d/c home

## 2017-09-02 NOTE — DISCHARGE NOTE ADULT - CARE PLAN
Principal Discharge DX:	Pneumonia  Goal:	Resolution of infection  Instructions for follow-up, activity and diet:	Pneumonia is a lung infection that can cause a fever, cough, and trouble breathing.  Continue all antibiotics as ordered until complete.  Nutrition is important, eat small frequent meals.  Get lots of rest and drink fluids.  Call your health care provider upon arrival home from hospital and make a follow up appointment for one week.  If your cough worsens, you develop fever greater than 101', you have shaking chills, a fast heartbeat, trouble breathing and/or feel your are breathing much faster than usual, call your healthcare provider.  Make sure you wash your hands frequently.  Secondary Diagnosis:	Atrial fibrillation  Instructions for follow-up, activity and diet:	Atrial fibrillation is the most common heart rhythm problem & has the risk of stroke & heart attack  It helps if you control your blood pressure, not drink more than 1-2 alcohol drinks per day, cut down on caffeine, getting treatment for over active thyroid gland, & getting exercise  Call your doctor if you feel your heart racing or beating unusually, chest tightness or pain, lightheaded, faint, shortness of breath especially with exercise  It is important to take your heart medication as prescribed  You may be on anticoagulation which is very important to take as directed - you may need blood work to monitor drug levels  Secondary Diagnosis:	CAD (coronary artery disease)  Instructions for follow-up, activity and diet:	Coronary artery disease is a condition where the arteries the supply the heart muscle gets clogged with fatty deposits & puts you at risk for a heart attack. Continue with medications as prescribed.   Call your doctor if you have any new pain, pressure, or discomfort in the center of your chest, pain, tingling or discomfort in arms, back, neck, jaw, or stomach, shortness of breath, nausea, vomiting, burping or heartburn, sweating, cold and clammy skin, racing or abnormal heartbeat for more than 10 minutes or if they keep coming & going.  Call 911 and do not tr to get to hospital by care  You can help yourself with lifestyle changes (quitting smoking if you smoke), eat lots of fruits & vegetables & low fat dairy products, not a lot of meat & fatty foods, walk or some form of physical activity most days of the week, lose weight if you are overweight  Take your cardiac medication as prescribed to lower cholesterol, to lower blood pressure, aspirin to prevent blood clots, and diabetes control  Make sure to keep appointments with doctor for cardiac follow up care  Secondary Diagnosis:	Type 2 diabetes mellitus  Instructions for follow-up, activity and diet:	HgA1C this admission 6.4.  Make sure you get your HgA1c checked every three months.  If you take oral diabetes medications, check your blood glucose two times a day.  If you take insulin, check your blood glucose before meals and at bedtime.  It's important not to skip any meals.  Keep a log of your blood glucose results and always take it with you to your doctor appointments.  Keep a list of your current medications including injectables and over the counter medications and bring this medication list with you to all your doctor appointments.  If you have not seen your ophthalmologist this year call for appointment.  Check your feet daily for redness, sores, or openings. Do not self treat. If no improvement in two days call your primary care physician for an appointment.  Low blood sugar (hypoglycemia) is a blood sugar below 70mg/dl. Check your blood sugar if you feel signs/symptoms of hypoglycemia. If your blood sugar is below 70 take 15 grams of carbohydrates (ex 4 oz of apple juice, 3-4 glucose tablets, or 4-6 oz of regular soda) wait 15 minutes and repeat blood sugar to make sure it comes up above 70.  If your blood sugar is above 70 and you are due for a meal, have a meal.  If you are not due for a meal have a snack.  This snack helps keeps your blood sugar at a safe range.  Secondary Diagnosis:	HTN (hypertension)  Instructions for follow-up, activity and diet:	!phtn  Secondary Diagnosis:	Macular degeneration  Instructions for follow-up, activity and diet:	Continue with medications as prescribed by your doctor.  Follow-up with your primary care physician .

## 2017-09-02 NOTE — DISCHARGE NOTE ADULT - ADDITIONAL INSTRUCTIONS
Dr Graff on Thursday, Sept 7 , in [facility], at 2:00PM Follow-up with cardiologist Dr Graff on Thursday, Sept 7 , in [place] office, at 2:00PM

## 2017-09-02 NOTE — DISCHARGE NOTE ADULT - CARE PROVIDER_API CALL
Germania Graff), Cardiovascular Disease; Internal Medicine  [address] [facility] [address] [place] [address]  Phone: [phone]  Fax: [phone] Germania Graff), Cardiovascular Disease; Internal Medicine  [address] [facility]  [address]  [place] [address]  Phone: [phone]  Fax: [phone]    Nick Hernandez), Internal Medicine  [id number] [place] [address]  Phone: [phone]  Fax: [phone]

## 2017-09-02 NOTE — PROGRESS NOTE ADULT - SUBJECTIVE AND OBJECTIVE BOX
Patient is a 83y old  Male who presents with a chief complaint of sob (02 Sep 2017 08:23)      SUBJECTIVE / OVERNIGHT EVENTS: No chest pain. No shortness of breath. No complaints. No events overnight.     MEDICATIONS  (STANDING):  insulin glargine Injectable (LANTUS) 10 Unit(s) SubCutaneous at bedtime  insulin lispro (HumaLOG) corrective regimen sliding scale   SubCutaneous at bedtime  dextrose 5%. 1000 milliLiter(s) (50 mL/Hr) IV Continuous <Continuous>  dextrose 50% Injectable 12.5 Gram(s) IV Push once  dextrose 50% Injectable 25 Gram(s) IV Push once  dextrose 50% Injectable 25 Gram(s) IV Push once  aspirin  chewable 81 milliGRAM(s) Oral daily  clopidogrel Tablet 75 milliGRAM(s) Oral daily  apixaban 5 milliGRAM(s) Oral every 12 hours  metoprolol 25 milliGRAM(s) Oral two times a day  piperacillin/tazobactam IVPB. 3.375 Gram(s) IV Intermittent every 8 hours    MEDICATIONS  (PRN):  dextrose Gel 1 Dose(s) Oral once PRN Blood Glucose LESS THAN 70 milliGRAM(s)/deciliter  glucagon  Injectable 1 milliGRAM(s) IntraMuscular once PRN Glucose LESS THAN 70 milligrams/deciliter      Vital Signs Last 24 Hrs  T(C): 36.6 (02 Sep 2017 04:30), Max: 36.7 (01 Sep 2017 21:26)  T(F): 97.8 (02 Sep 2017 04:30), Max: 98 (01 Sep 2017 21:26)  HR: 68 (02 Sep 2017 04:30) (59 - 69)  BP: 132/68 (02 Sep 2017 04:30) (116/67 - 151/89)  BP(mean): --  RR: 20 (02 Sep 2017 04:30) (18 - 20)  SpO2: 98% (02 Sep 2017 04:30) (96% - 98%)  CAPILLARY BLOOD GLUCOSE  106 (02 Sep 2017 08:45)  141 (01 Sep 2017 22:28)  131 (01 Sep 2017 12:54)        I&O's Summary    01 Sep 2017 07:01  -  02 Sep 2017 07:00  --------------------------------------------------------  IN: 740 mL / OUT: 900 mL / NET: -160 mL    02 Sep 2017 07:01  -  02 Sep 2017 09:24  --------------------------------------------------------  IN: 0 mL / OUT: 200 mL / NET: -200 mL        PHYSICAL EXAM:  GENERAL: NAD, well-developed  HEAD:  Atraumatic, Normocephalic  EYES: EOMI, PERRLA, conjunctiva and sclera clear  NECK: Supple, No JVD  CHEST/LUNG: Clear to auscultation bilaterally; No wheeze  HEART: Regular rate and rhythm; No murmurs, rubs, or gallops  ABDOMEN: Soft, Nontender, Nondistended; Bowel sounds present  EXTREMITIES:  2+ Peripheral Pulses, No clubbing, cyanosis, or edema  PSYCH: AAOx3  NEUROLOGY: non-focal  SKIN: No rashes or lesions    LABS:                        11.9   8.2   )-----------( 236      ( 01 Sep 2017 06:31 )             35.8     09-01    141  |  104  |  16  ----------------------------<  111<H>  4.2   |  23  |  1.46<H>    Ca    9.5      01 Sep 2017 06:31                RADIOLOGY & ADDITIONAL TESTS:    Imaging Personally Reviewed:    Consultant(s) Notes Reviewed:      Care Discussed with Consultants/Other Providers:

## 2017-09-02 NOTE — DISCHARGE NOTE ADULT - PLAN OF CARE
Resolution of infection Pneumonia is a lung infection that can cause a fever, cough, and trouble breathing.  Continue all antibiotics as ordered until complete.  Nutrition is important, eat small frequent meals.  Get lots of rest and drink fluids.  Call your health care provider upon arrival home from hospital and make a follow up appointment for one week.  If your cough worsens, you develop fever greater than 101', you have shaking chills, a fast heartbeat, trouble breathing and/or feel your are breathing much faster than usual, call your healthcare provider.  Make sure you wash your hands frequently. Atrial fibrillation is the most common heart rhythm problem & has the risk of stroke & heart attack  It helps if you control your blood pressure, not drink more than 1-2 alcohol drinks per day, cut down on caffeine, getting treatment for over active thyroid gland, & getting exercise  Call your doctor if you feel your heart racing or beating unusually, chest tightness or pain, lightheaded, faint, shortness of breath especially with exercise  It is important to take your heart medication as prescribed  You may be on anticoagulation which is very important to take as directed - you may need blood work to monitor drug levels Coronary artery disease is a condition where the arteries the supply the heart muscle gets clogged with fatty deposits & puts you at risk for a heart attack. Continue with medications as prescribed.   Call your doctor if you have any new pain, pressure, or discomfort in the center of your chest, pain, tingling or discomfort in arms, back, neck, jaw, or stomach, shortness of breath, nausea, vomiting, burping or heartburn, sweating, cold and clammy skin, racing or abnormal heartbeat for more than 10 minutes or if they keep coming & going.  Call 911 and do not tr to get to hospital by care  You can help yourself with lifestyle changes (quitting smoking if you smoke), eat lots of fruits & vegetables & low fat dairy products, not a lot of meat & fatty foods, walk or some form of physical activity most days of the week, lose weight if you are overweight  Take your cardiac medication as prescribed to lower cholesterol, to lower blood pressure, aspirin to prevent blood clots, and diabetes control  Make sure to keep appointments with doctor for cardiac follow up care HgA1C this admission 6.4.  Make sure you get your HgA1c checked every three months.  If you take oral diabetes medications, check your blood glucose two times a day.  If you take insulin, check your blood glucose before meals and at bedtime.  It's important not to skip any meals.  Keep a log of your blood glucose results and always take it with you to your doctor appointments.  Keep a list of your current medications including injectables and over the counter medications and bring this medication list with you to all your doctor appointments.  If you have not seen your ophthalmologist this year call for appointment.  Check your feet daily for redness, sores, or openings. Do not self treat. If no improvement in two days call your primary care physician for an appointment.  Low blood sugar (hypoglycemia) is a blood sugar below 70mg/dl. Check your blood sugar if you feel signs/symptoms of hypoglycemia. If your blood sugar is below 70 take 15 grams of carbohydrates (ex 4 oz of apple juice, 3-4 glucose tablets, or 4-6 oz of regular soda) wait 15 minutes and repeat blood sugar to make sure it comes up above 70.  If your blood sugar is above 70 and you are due for a meal, have a meal.  If you are not due for a meal have a snack.  This snack helps keeps your blood sugar at a safe range. !phtn Continue with medications as prescribed by your doctor.  Follow-up with your primary care physician .

## 2017-09-02 NOTE — DISCHARGE NOTE ADULT - PATIENT PORTAL LINK FT
“You can access the FollowHealth Patient Portal, offered by [facility], by registering with the following website: [email or web address]”

## 2017-09-02 NOTE — PROGRESS NOTE ADULT - ATTENDING COMMENTS
Patient seen and examined, agree with above assessment and plan as transcribed above.    - Clinically improved  - D/C home on abx per primary team    Sandeep Ruiz MD, FACC  [facility], [facility]  [address] [place] [address]  PHONE:  [phone]  BEEPER : [phone]
Patient seen and examined, agree with above assessment and plan as transcribed above.    - Progressing well  - Abx for PNA per med    Sandeep Ruiz MD, FACC  [facility], [facility]  [address] [place] [address]  PHONE:  [phone]  BEEPER : [phone]
Patient seen and examined, agree with above assessment and plan as transcribed above.    - admitted with PNA clinically improved on ABX  - Cont ASA, Plavix, Eliquis    Sandeep Ruiz MD, FACC  [facility], [facility]  [address] [place] [address]  PHONE:  [phone]  BEEPER : [phone]
CARDIOLOGY ATTENDING    Patient seen and examined. Agree with above. No further cardiac workup needed. Lifelong A/C for PAF.

## 2017-09-03 LAB
CULTURE RESULTS: SIGNIFICANT CHANGE UP
CULTURE RESULTS: SIGNIFICANT CHANGE UP
SPECIMEN SOURCE: SIGNIFICANT CHANGE UP
SPECIMEN SOURCE: SIGNIFICANT CHANGE UP

## 2018-08-02 NOTE — H&P ADULT - PSH
H/O Moh's micrographic surgery for skin cancer    History of cataract surgery    S/P CABG x 4  2011 at [facility]
no

## 2018-12-25 ENCOUNTER — EMERGENCY (EMERGENCY)
Facility: HOSPITAL | Age: 83
LOS: 1 days | Discharge: HOME | End: 2018-12-25
Attending: EMERGENCY MEDICINE
Payer: MEDICARE

## 2018-12-25 VITALS
RESPIRATION RATE: 18 BRPM | OXYGEN SATURATION: 98 % | TEMPERATURE: 98 F | HEART RATE: 98 BPM | SYSTOLIC BLOOD PRESSURE: 150 MMHG | WEIGHT: 300.05 LBS | DIASTOLIC BLOOD PRESSURE: 88 MMHG

## 2018-12-25 VITALS
RESPIRATION RATE: 18 BRPM | SYSTOLIC BLOOD PRESSURE: 150 MMHG | OXYGEN SATURATION: 99 % | HEART RATE: 74 BPM | TEMPERATURE: 98 F | DIASTOLIC BLOOD PRESSURE: 93 MMHG

## 2018-12-25 DIAGNOSIS — Z95.1 PRESENCE OF AORTOCORONARY BYPASS GRAFT: Chronic | ICD-10-CM

## 2018-12-25 DIAGNOSIS — Z85.828 PERSONAL HISTORY OF OTHER MALIGNANT NEOPLASM OF SKIN: Chronic | ICD-10-CM

## 2018-12-25 DIAGNOSIS — Z98.49 CATARACT EXTRACTION STATUS, UNSPECIFIED EYE: Chronic | ICD-10-CM

## 2018-12-25 PROBLEM — I10 ESSENTIAL (PRIMARY) HYPERTENSION: Chronic | Status: ACTIVE | Noted: 2017-08-11

## 2018-12-25 PROBLEM — I48.91 UNSPECIFIED ATRIAL FIBRILLATION: Chronic | Status: ACTIVE | Noted: 2017-08-15

## 2018-12-25 PROBLEM — E11.9 TYPE 2 DIABETES MELLITUS WITHOUT COMPLICATIONS: Chronic | Status: ACTIVE | Noted: 2017-08-11

## 2018-12-25 PROBLEM — H35.30 UNSPECIFIED MACULAR DEGENERATION: Chronic | Status: ACTIVE | Noted: 2017-08-25

## 2018-12-25 PROBLEM — I25.10 ATHEROSCLEROTIC HEART DISEASE OF NATIVE CORONARY ARTERY WITHOUT ANGINA PECTORIS: Chronic | Status: ACTIVE | Noted: 2017-08-15

## 2018-12-25 PROBLEM — M19.90 UNSPECIFIED OSTEOARTHRITIS, UNSPECIFIED SITE: Chronic | Status: ACTIVE | Noted: 2017-08-25

## 2018-12-25 PROCEDURE — 99284 EMERGENCY DEPT VISIT MOD MDM: CPT

## 2018-12-25 PROCEDURE — 70450 CT HEAD/BRAIN W/O DYE: CPT | Mod: 26

## 2018-12-25 PROCEDURE — 70450 CT HEAD/BRAIN W/O DYE: CPT

## 2018-12-25 PROCEDURE — 82962 GLUCOSE BLOOD TEST: CPT

## 2018-12-25 PROCEDURE — 93005 ELECTROCARDIOGRAM TRACING: CPT

## 2018-12-25 PROCEDURE — 99284 EMERGENCY DEPT VISIT MOD MDM: CPT | Mod: 25

## 2018-12-25 PROCEDURE — 93010 ELECTROCARDIOGRAM REPORT: CPT

## 2018-12-25 NOTE — ED PROVIDER NOTE - MEDICAL DECISION MAKING DETAILS
Pt with mechanical fall from bed, denies any injuries or pain, but is on anticoagulation and therefore will check CT head and re-eval.

## 2018-12-25 NOTE — ED PROVIDER NOTE - PROGRESS NOTE DETAILS
Pt doing well.  CT head unremarkable.  Will arrange ambulette for Pt to go home.  Advised strict return precautions and PMD f/u.

## 2018-12-25 NOTE — ED ADULT NURSE NOTE - NSIMPLEMENTINTERV_GEN_ALL_ED
Implemented All Fall with Harm Risk Interventions:  [place] to call system. Call bell, personal items and telephone within reach. Instruct patient to call for assistance. Room bathroom lighting operational. Non-slip footwear when patient is off stretcher. Physically safe environment: no spills, clutter or unnecessary equipment. Stretcher in lowest position, wheels locked, appropriate side rails in place. Provide visual cue, wrist band, yellow gown, etc. Monitor gait and stability. Monitor for mental status changes and reorient to person, place, and time. Review medications for side effects contributing to fall risk. Reinforce activity limits and safety measures with patient and family. Provide visual clues: red socks.

## 2018-12-25 NOTE — ED PROVIDER NOTE - OBJECTIVE STATEMENT
85 y/o man, h/o CAD, A-fib (on Eliquis), DM, HTN, BIB EMS after he accidentally fell out of bed and had difficulty getting up by himself for about 2 hours so he called 911 eventually once he made it to the phone.  He denies any pain or injury, specifically denies head injury/headache/neck pain/weakness/numbness.  Able to move all extremities without difficulty.  He says that in the past, he usually has difficulty getting up on his own if he falls.

## 2020-10-01 NOTE — DISCHARGE NOTE ADULT - MEDICATION SUMMARY - MEDICATIONS TO CHANGE
I will SWITCH the dose or number of times a day I take the medications listed below when I get home from the hospital:  None
01-Oct-2020 16:16

## 2021-02-02 NOTE — DISCHARGE NOTE ADULT - SMOKING EVEN A SINGLE PUFF INCREASES THE LIKELIHOOD OF A FULL RELAPSE, WITHDRAWAL SYMPTOMS PEAK WITHIN 1-2 WEEKS, BUT CAN PERSIST FOR MONTHS
Brief Operative Note    Patient: Justin Madrigal 42 year old female    MRN: [id number]    Surgeon(s): Gricelda Crabtree MD  Phone Number: [phone]                       Surgeon(s) and Role:     * Gricelda Crabtree MD - Primary    Assistant(s):     Pre-Op Diagnosis: Biliary colic     Post-Op Diagnosis: acute cholecystitis     Procedure: Procedure(s):  CHOLECYSTECTOMY, LAPAROSCOPIC, POSSIBLE CHOLANGIOGRAM    Anesthesia Type: General                                   Complications: None    Description: see full op note    Findings: acute calculous cholecystitis    Specimens Removed:   ID Type Source Tests Collected by Time   A : gallbladder Tissue Gallbladder SURGICAL PATHOLOGY Dalila Morales RN 2/2/2021 1420        Estimated Blood Loss: No Value 10 cc    Assistant Tasks: Opening and closing, Dissecting tissue, Removing tissue and Implanting devices     Implants:   Implant Name Type Inv. Item Serial No.  Lot No. LRB No. Used Action   APPLIER ENDO CLIP III 5MM M/L - [id number] Clip APPLIER ENDO CLIP III 5MM M/L  [employer]  N/A 1 Implanted         Dr. Crabtree was present for the key portions of the procedure and was immediately available for the non-key portions    Bertrand Domingo  PGY2       Statement Selected

## 2021-02-25 NOTE — ED ADULT NURSE NOTE - CAS TRG GENERAL AIRWAY, MLM
Your neck XR did not show any significant findings today, although it did see what appeared to be muscle spasms. I believe it is possible that your neck pain is being caused by an auto-immune condition. I think you should absolutely follow-up with your primary care provider to get a referral for a Rheumatologist, otherwise known as an autoimmune doctor. If you have any new or worsening symptoms, then please do return to the Emergency Department.   Patent

## 2021-06-06 NOTE — ED ADULT TRIAGE NOTE - ESI TRIAGE ACUITY LEVEL, MLM
2
Justine Jasso (MD): DDx includes gout vs. pseudogout vs. cellulitis. Plan for XR, labs, and Hand consult.

## 2022-04-27 NOTE — DIETITIAN INITIAL EVALUATION ADULT. - WEIGHT IN KG
Problem: PAIN - ADULT  Goal: Verbalizes/displays adequate comfort level or baseline comfort level  Description: Interventions:  - Encourage patient to monitor pain and request assistance  - Assess pain using appropriate pain scale  - Administer analgesics based on type and severity of pain and evaluate response  - Implement non-pharmacological measures as appropriate and evaluate response  - Consider cultural and social influences on pain and pain management  - Notify physician/advanced practitioner if interventions unsuccessful or patient reports new pain  Outcome: Progressing     Problem: INFECTION - ADULT  Goal: Absence or prevention of progression during hospitalization  Description: INTERVENTIONS:  - Assess and monitor for signs and symptoms of infection  - Monitor lab/diagnostic results  - Monitor all insertion sites, i e  indwelling lines, tubes, and drains  - Monitor endotracheal if appropriate and nasal secretions for changes in amount and color  - [place] appropriate cooling/warming therapies per order  - Administer medications as ordered  - Instruct and encourage patient and family to use good hand hygiene technique  - Identify and instruct in appropriate isolation precautions for identified infection/condition  Outcome: Progressing  Goal: Absence of fever/infection during neutropenic period  Description: INTERVENTIONS:  - Monitor WBC    Outcome: Progressing     Problem: SAFETY ADULT  Goal: Patient will remain free of falls  Description: INTERVENTIONS:  - Educate patient/family on patient safety including physical limitations  - Instruct patient to call for assistance with activity   - Consult OT/PT to assist with strengthening/mobility   - Keep Call bell within reach  - Keep bed low and locked with side rails adjusted as appropriate  - Keep care items and personal belongings within reach  - Initiate and maintain comfort rounds  - Make Fall Risk Sign visible to staff  - Offer Toileting every  Hours, in advance of need  - Initiate/Maintain alarm  - Obtain necessary fall risk management equipment:   - Apply yellow socks and bracelet for high fall risk patients  - Consider moving patient to room near nurses station  Outcome: Progressing  Goal: Maintain or return to baseline ADL function  Description: INTERVENTIONS:  -  Assess patient's ability to carry out ADLs; assess patient's baseline for ADL function and identify physical deficits which impact ability to perform ADLs (bathing, care of mouth/teeth, toileting, grooming, dressing, etc )  - Assess/evaluate cause of self-care deficits   - Assess range of motion  - Assess patient's mobility; develop plan if impaired  - Assess patient's need for assistive devices and provide as appropriate  - Encourage maximum independence but intervene and supervise when necessary  - Involve family in performance of ADLs  - Assess for home care needs following discharge   - Consider OT consult to assist with ADL evaluation and planning for discharge  - Provide patient education as appropriate  Outcome: Progressing  Goal: Maintains/Returns to pre admission functional level  Description: INTERVENTIONS:  - Perform BMAT or MOVE assessment daily    - Set and communicate daily mobility goal to care team and patient/family/caregiver  - Collaborate with rehabilitation services on mobility goals if consulted  - Perform Range of Motion  times a day  - Reposition patient every  hours    - Dangle patient  times a day  - Stand patient  times a day  - Ambulate patient  times a day  - Out of bed to chair  times a day   - Out of bed for meals times a day  - Out of bed for toileting  - Record patient progress and toleration of activity level   Outcome: Progressing     Problem: DISCHARGE PLANNING  Goal: Discharge to home or other facility with appropriate resources  Description: INTERVENTIONS:  - Identify barriers to discharge w/patient and caregiver  - Arrange for needed discharge resources and transportation as appropriate  - Identify discharge learning needs (meds, wound care, etc )  - Arrange for interpretive services to assist at discharge as needed  - Refer to Case Management Department for coordinating discharge planning if the patient needs post-hospital services based on physician/advanced practitioner order or complex needs related to functional status, cognitive ability, or social support system  Outcome: Progressing     Problem: Knowledge Deficit  Goal: Patient/family/caregiver demonstrates understanding of disease process, treatment plan, medications, and discharge instructions  Description: Complete learning assessment and assess knowledge base    Interventions:  - Provide teaching at level of understanding  - Provide teaching via preferred learning methods  Outcome: Progressing     Problem: Potential for Falls  Goal: Patient will remain free of falls  Description: INTERVENTIONS:  - Educate patient/family on patient safety including physical limitations  - Instruct patient to call for assistance with activity   - Consult OT/PT to assist with strengthening/mobility   - Keep Call bell within reach  - Keep bed low and locked with side rails adjusted as appropriate  - Keep care items and personal belongings within reach  - Initiate and maintain comfort rounds  - Make Fall Risk Sign visible to staff  - Offer Toileting every Hours, in advance of need  - Initiate/Maintain alarm  - Obtain necessary fall risk management equipment:   - Apply yellow socks and bracelet for high fall risk patients  - Consider moving patient to room near nurses station  Outcome: Progressing     Problem: METABOLIC, FLUID AND ELECTROLYTES - ADULT  Goal: Electrolytes maintained within normal limits  Description: INTERVENTIONS:  - Monitor labs and assess patient for signs and symptoms of electrolyte imbalances  - Administer electrolyte replacement as ordered  - Monitor response to electrolyte replacements, including repeat lab results as appropriate  - Instruct patient on fluid and nutrition as appropriate  Outcome: Progressing  Goal: Fluid balance maintained  Description: INTERVENTIONS:  - Monitor labs   - Monitor I/O and WT  - Instruct patient on fluid and nutrition as appropriate  - Assess for signs & symptoms of volume excess or deficit  Outcome: Progressing  Goal: Glucose maintained within target range  Description: INTERVENTIONS:  - Monitor Blood Glucose as ordered  - Assess for signs and symptoms of hyperglycemia and hypoglycemia  - Administer ordered medications to maintain glucose within target range  - Assess nutritional intake and initiate nutrition service referral as needed  Outcome: Progressing 80.7

## 2022-07-14 NOTE — PROGRESS NOTE ADULT - PROBLEM SELECTOR PLAN 1
s/p one dose of Lasix. Denies SOB currently  - Started imdur, HCTZ atorvastatin  Echo shows---- S/p one dose of Lasix. Denies SOB currently  - Started imdur, HCTZ atorvastatin  Echo shows---- S/p one dose of Lasix. Denies SOB currently  - Started imdur, HCTZ atorvastatin  Echo pending results (4) rarely moist

## 2022-09-15 NOTE — ED ADULT NURSE NOTE - CADM POA URETHRAL CATHETER
Otolaryngology Clinic Note    Subjective:       Patient ID: Mely Viera is a 59 y.o. female.    Chief Complaint: Cerumen Impaction and Dizziness      History of Present Illness: Mely Viera is a 59 y.o. female presenting with dizziness. She had issue related to laxative, ended up with irregular heartbeat 5 weeks ago. Dizzy and lightheaded since then. Was every day. Has not been dizzy today. Is seems like imbalance, falls to the right, that ear has seemed maybe muffled, maybe full. Walking, would hold on to wall. Lightheadedness, feels like she will black out. No room spinning since beginning. Walking and turning her head would trigger. Happened once yesterday, none today off meds. For first 2 weeks was continuous with nausea, no vomiting. Now lasting 30 seconds.   She has taken antivert which has helped. PCP was worried about wax, so sent here.   Does have tinnitus occasionally, both ears. Has some hearing loss that fluctuates she thinks related to wax. She pops her ears. She does not use qtips.   Has migraine history and headaches every day. Has insomnia.   Takes xanax nightly. Does not seem like she gets dizzy if she gets up at night.   No viral illnesses. BP and CBC were ok with initial hospital episode.   She does report her right nasal passage seems obstructed all the time, and when left gets stuffy, she cannot breathe well. She denies other significant sinus concerns.            Past Surgical History:   Procedure Laterality Date    NONE       Past Medical History:   Diagnosis Date    Anxiety     Depression     Heat stroke     HSV (herpes simplex virus) infection     Sleep disturbance      Social Determinants of Health     Tobacco Use: Low Risk     Smoking Tobacco Use: Never    Smokeless Tobacco Use: Never   Alcohol Use: Not At Risk    Frequency of Alcohol Consumption: 2-4 times a month    Average Number of Drinks: 1 or 2    Frequency of Binge Drinking: Never   Financial Resource Strain: Low Risk      Difficulty of Paying Living Expenses: Not hard at all   Food Insecurity: No Food Insecurity    Worried About Running Out of Food in the Last Year: Never true    Ran Out of Food in the Last Year: Never true   Transportation Needs: No Transportation Needs    Lack of Transportation (Medical): No    Lack of Transportation (Non-Medical): No   Physical Activity: Insufficiently Active    Days of Exercise per Week: 2 days    Minutes of Exercise per Session: 60 min   Stress: Stress Concern Present    Feeling of Stress : To some extent   Social Connections: Unknown    Frequency of Communication with Friends and Family: More than three times a week    Frequency of Social Gatherings with Friends and Family: Three times a week    Attends Taoism Services: Not on file    Active Member of Clubs or Organizations: Yes    Attends Club or Organization Meetings: More than 4 times per year    Marital Status:    Housing Stability: Low Risk     Unable to Pay for Housing in the Last Year: No    Number of Places Lived in the Last Year: 1    Unstable Housing in the Last Year: No   Depression PHQ-4: Low Risk     Last PHQ Score: 0     Review of patient's allergies indicates:   Allergen Reactions    Shrimp Swelling     hives    Temazepam Swelling    Egg      Other reaction(s): Hives    Escitalopram      Other reaction(s): did not like effects     Current Outpatient Medications   Medication Instructions    ALPRAZolam (XANAX) 0.25 MG tablet TAKE 1 TABLET(0.25 MG) BY MOUTH EVERY NIGHT AS NEEDED FOR INSOMNIA OR ANXIETY    eszopiclone (LUNESTA) 2 mg, Oral, Nightly PRN    meclizine (ANTIVERT) 25 mg, Oral, 3 times daily PRN    valACYclovir (VALTREX) 500 mg, Oral, Daily         14 point ROS below  Answers submitted by the patient for this visit:  Review of Symptoms Questionnaire  (Submitted on 9/14/2022)  None of these: Yes  hearing loss: Yes  None of these : Yes  None of these: Yes  Irregular heartbeat?: Yes  abdominal pain:  Yes  constipation: Yes  None of these: Yes  None of these: Yes  None of these : Yes  Food Allergies?: Yes  None of these : Yes  dizziness: Yes  Light-headedness: Yes  None of these: Yes  sleep disturbance: Yes                Objective:      Vitals:    09/15/22 1327   BP: 120/60       General: NAD, well appearing  Eyes: Normal conjunctiva and lids  Face: symmetric, nerve intact  Nose: The nose is without any evidence of any deformity. The nasal mucosa is moist. The septum deviates right with large spur anterior, small crest thickening on the left. There is no evidence of septal hematoma. The turbinates are without abnormality.   Ears: The ears are with normal-appearing pinna. Examination of the canals is normal appearing bilaterally. There is no drainage or erythema noted. The tympanic membranes are normal appearing with pearly color, normal-appearing landmarks and normal light reflex. Hearing is grossly intact.  Mouth: No obvious abnormalities to the lips. The teeth are unremarkable. The gingivae are without any obvious evidence of infection or lesion. The oral mucosa is moist and pink. There are no obvious masses to the hard or soft palate.   Oropharynx: The uvula is midline.  The tongue is midline. The posterior pharynx is without erythema or exudate. The tonsils are normal appearing.  Salivary glands: The salivary glands are symmetric and not enlarged, no masses  Neck: No lymphadenopathy, trachea midline, thryoid not enlarged.  Psych: Normal mood and affect.   Neuro: Grossly intact  Speech: fluent  [place]-hallpike negative, head roll negative   Rapid alternating movement negative   Heel to shin negative   Romberg negative   Gait and balance normal       Test Review: I personally reviewed her cardiology note, showing that she has PVCs and they are still working her up.  her audio showed mild-mod HF SNHL AU        Assessment and Plan:       1. Vestibulitis of ear, right    2. Imbalance    3. Tinnitus of right ear     4. DNS (deviated nasal septum)    5. Nasal congestion          Discussed the complex nature of dizziness, does sound like she may  have had vestibulitis and is recovering. Agree with avoiding meclizine, xanax is ok. Caution, fall risk., be careful until these episodes get better.    Continue cardiac work up as this could also be source. Lightheadedness may be different, agree with monitoring BP.   Audio today to rule out labyrinthitis but no evidence of asymmetry.   Discussed flonase BID for nasal congestion, can consider septo if not better. She will get OTC.     RTC: PRN    Plan of care was discussed in detail with the patient, who agreed with the plan as above. All questions were answered in detail.     Candy Aguirre MD  Otolaryngology       No

## 2022-10-10 NOTE — TRANSFER ACCEPTANCE NOTE - PROBLEM SELECTOR PLAN 2
Impression: Anatomical narrow angle, bilateral: H40.033. Plan: Transient blurring of vision to the right eye. Not for LPI a this stage, will continue to monitor. No treatment recommended at this time RTC 6 months IOP check
tele  rate control meds  d/w attending antiocoagulation for high chads-vasc score

## 2022-11-01 NOTE — H&P CARDIOLOGY - TEMPERATURE IN FAHRENHEIT (DEGREES F)
98.2 Mastoid Interpolation Flap Text: A decision was made to reconstruct the defect utilizing an interpolation axial flap and a staged reconstruction.  A telfa template was made of the defect.  This telfa template was then used to outline the mastoid interpolation flap.  The donor area for the pedicle flap was then injected with anesthesia.  The flap was excised through the skin and subcutaneous tissue down to the layer of the underlying musculature.  The pedicle flap was carefully excised within this deep plane to maintain its blood supply.  The edges of the donor site were undermined.   The donor site was closed in a primary fashion.  The pedicle was then rotated into position and sutured.  Once the tube was sutured into place, adequate blood supply was confirmed with blanching and refill.  The pedicle was then wrapped with xeroform gauze and dressed appropriately with a telfa and gauze bandage to ensure continued blood supply and protect the attached pedicle.

## 2023-11-17 NOTE — PATIENT PROFILE ADULT. - NS PRO PT REFERRAL QUES 2 YN
Patient came in with the complaints of left hip pain. As per patient son, patient had a fall 3 days ago and went to [facility] and had x rays done which was negative and stitches to the left elbow and now the pain is getting worse while walking. No other complaints. No distress noted. Specimens collected and sent. Nursing care continues no

## 2023-12-09 NOTE — ED ADULT NURSE NOTE - CAS DISC DELAYS
Ramo Woods is a 29 year old male presenting to the walk-in clinic today for a left ear pain onset Wednesday on and off with lack of hearing.       and lower back pain, onset 1.5 weeks.  Patient denies trama,  woke up with pain.  Difficulty walking without pain.       Denies known Latex allergy or symptoms of Latex sensitivity.  Medications reviewed and updated.  Patient would like communication of their results via:      Cell Phone:   Telephone Information:   Mobile [phone]     Okay to leave a message containing results? Yes     Waiting ambulance service

## 2024-03-07 NOTE — PATIENT PROFILE ADULT. - --DESCRIBE SURGICAL SITE
----- Message from Tyler Landry DO sent at 3/7/2024  7:46 AM CST -----  Please call patient regarding results. Labs don't show any acute issues. She has an appointment tomorrow with PCP.    s/p cath rt groin

## 2024-03-25 NOTE — DISCHARGE NOTE ADULT - ABILITY TO HEAR (WITH HEARING AID OR HEARING APPLIANCE IF NORMALLY USED):
Adequate: hears normal conversation without difficulty Detail Level: Generalized Detail Level: Simple

## 2025-02-03 NOTE — PROGRESS NOTE ADULT - SUBJECTIVE AND OBJECTIVE BOX
5N: CARD ATTEND F/U NOTE  9:00am      Orig  HPI:  82 year old Male from home lives alone, ambulates independently, with PMH Diabetes, CAD S/p CABG? in 2011, [place],  came with c/o SOB on exertion since one month ago. His exertional SOB was progressively getting worse since one week, so he called his physician who told him to go to ER. Dyspnea is aggravated by lifting grocery bags and walking more then a block, relieved by sitting down. He denies Orthopnea, PND, chest pain, dizziness, syncope. He also c/o chronic B/L leg swelling. He reported that he goes to his cardiologist Dr. Graff who scheduled him for Nuclear Stress test but he cancelled that test as it was expensive.   When seen in ED he was lying comfortably, He denies SOB currently. (11 Aug 2017 23:28)    ________________________________    No clinical developments since last examined    EXAM: in NAD    Vital Signs Last 24 Hrs  T(C): 36.6 (14 Aug 2017 08:18), Max: 37 (13 Aug 2017 12:09)  T(F): 97.8 (14 Aug 2017 08:18), Max: 98.6 (13 Aug 2017 12:09)  HR: 64 (14 Aug 2017 08:18) (56 - 64)  BP: 122/77 (14 Aug 2017 08:18) (115/59 - 132/-)  RR: 18 (14 Aug 2017 08:18) (16 - 18)  SpO2: 99% (14 Aug 2017 08:18) (97% - 99%)  Daily     Daily Weight in k.1 (14 Aug 2017 06:02)      NECK: no JVD/HJR, nl c. pulses  LUNGS: clear  COR: nl s1/s2, distant 2/6 KATELYN over AV, no g, rub  LE: bilat trace ankle/pedal edema     CM:  NSR, no ectopy, SB at times 40s-50s    2Decho 12    1. Normal mitral valve. Mild mitral regurgitation.  2. Calcified trileaflet aortic valve with normal opening.  3. Aortic Root: 4.0 cm.  4. Moderate left atrial enlargement.  5. Moderate concentric left ventricular hypertrophy.  6. Moderate global left ventricular dysfunction  (EF=40-45%).  7. Grade III diastolic dysfunction  8. Normal right atrium.  9. Normal right ventricular size and function.  10. RV systolic pressure is moderately increased (PASP 48mmHg).  11. There is mild tricuspid regurgitation.  12. There is mild pulmonic regurgitation.  13. Normal pericardium with no pericardial effusion.    LABS:                         12.4   7.0   )-----------( 168      ( 14 Aug 2017 06:48 )             37.4     08-14    144  |  110<H>  |  21<H>  ----------------------------<  105<H>  3.9   |  24  |  1.23    Ca    9.0      14 Aug 2017 06:48  Phos  3.4       Mg     2.3     
5N: CARD ATTEND F/U NOTE  9:30AM      Orig  HPI:  82 year old Male from home lives alone, ambulates independently, with PMH Diabetes, CAD S/p CABG? in 2011, [place],  came with c/o SOB on exertion since one month ago. His exertional SOB was progressively getting worse since one week, so he called his physician who told him to go to ER. Dyspnea is aggravated by lifting grocery bags and walking more then a block, relieved by sitting down. He denies Orthopnea, PND, chest pain, dizziness, syncope. He also c/o chronic B/L leg swelling. He reported that he goes to his cardiologist Dr. Graff who scheduled him for Nuclear Stress test but he cancelled that test as it was expensive.   When seen in ED he was lying comfortably, He denies SOB currently. (11 Aug 2017 23:28)    ________________________________    Since last examined    EXAM: alert , in NAD    Vital Signs Last 24 Hrs  T(C): 36.7 (13 Aug 2017 07:34), Max: 36.8 (12 Aug 2017 11:01)  T(F): 98 (13 Aug 2017 07:34), Max: 98.3 (12 Aug 2017 16:15)  HR: 56 (13 Aug 2017 07:34) (52 - 68)  BP: 150/53 (13 Aug 2017 07:34) (111/52 - 179/71)  RR: 17 (13 Aug 2017 07:34) (16 - 20)  SpO2: 95% (13 Aug 2017 07:34) (95% - 100%)  Daily     Daily Weight in k.8 (13 Aug 2017 05:27)    NECK: no JVD/HJR, nl c. pulses  LUNGS: clear  COR: nl s1/s2, distant 2/6 KATELYN over AV, no g, rub  ABD: nl BS, soft, not tender, no oranomeg  LE: bilat trace ankle/pedal edema, peripheral pulses not palpable    CM:  NSR, no ectopy, SB at times 40s-50s    2Decho 8/12    1. Normal mitral valve. Mild mitral regurgitation.  2. Calcified trileaflet aortic valve with normal opening.  3. Aortic Root: 4.0 cm.  4. Moderate left atrial enlargement.  5. Moderate concentric left ventricular hypertrophy.  6. Moderate global left ventricular dysfunction  (EF=40-45%).  7. Grade III diastolic dysfunction  8. Normal right atrium.  9. Normal right ventricular size and function.  10. RV systolic pressure is moderately increased (PASP 48mmHg).  11. There is mild tricuspid regurgitation.  12. There is mild pulmonic regurgitation.  13. Normal pericardium with no pericardial effusion.      LABS:                         11.7   8.2   )-----------( 185      ( 13 Aug 2017 06:26 )             36.1     08-    142  |  108  |  19<H>  ----------------------------<  112<H>  3.9   |  26  |  1.23    Ca    8.8      13 Aug 2017 06:26  Phos  3.6     08-13  Mg     2.3     08-13    TPro  7.2  /  Alb  3.6  /  TBili  0.5  /  DBili  x   /  AST  24  /  ALT  20  /  AlkPhos  74  08-11    CARDIAC MARKERS ( 12 Aug 2017 07:23 )  0.028 ng/mL / x     / x     / x     / x      CARDIAC MARKERS ( 11 Aug 2017 22:01 )  0.023 ng/mL / x     / x     / x     / x      CARDIAC MARKERS ( 11 Aug 2017 13:32 )  0.017 ng/mL / x     / x     / x     / x
HPI:  82 year old Male from home lives alone, ambulates independently, with PMH Diabetes, CAD S/p stent? CABG? in Nov 2011, Obesity, OA, macular degeneration came with c/o SOB on exertion since one month ago. He reports that He become SOB walking one block,  His exertional SOB was progressively getting worse since one week, so he called his physician who told him to go to ER. Dyspnea is aggravated by lifting grocery bags and walking more then a block, relieved by sitting down. He denies Orthopnea, PND, chest pain, dizziness, recent URI, heartburn, fever. He also c/o chronic B/L leg swelling and knee pain due to Osteoarthritis. He reported that he goes to his cardiologist Dr. Rose olivera in [facility] who scheduled him for Nuclear Stress test but he cancelled that test as it was expensive. He did not know whether his cardiologist has done an Echo and EF. In ED, EKG was done which shows NS-T wave changes with occasional PVC. CXR was done "Focal opacity in the periphery of the left lower lobe along the lateral aspect of the left hemidiaphragm not well visualized on the lateral image which could represent focal scarring/atelectasis. Otherwise, clear lungs."   When seen in ED he was lying comfortably, He denies SOB currently. (11 Aug 2017 23:28)      Patient is a 82y old  Male who presents with a chief complaint of SOB (13 Aug 2017 21:58)      INTERVAL HPI/OVERNIGHT EVENTS:    Pt has no complaints overnight. Pt seen and examined at bedside, sitting upright, comfortable.     T(C): 36.9 (08-14-17 @ 15:30), Max: 36.9 (08-14-17 @ 15:30)  HR: 66 (08-14-17 @ 15:30) (56 - 66)  BP: 135/71 (08-14-17 @ 15:30) (115/59 - 170/92)  RR: 18 (08-14-17 @ 15:30) (16 - 18)  SpO2: 94% (08-14-17 @ 15:30) (94% - 99%)  Wt(kg): --  I&O's Summary      REVIEW OF SYSTEMS: denies fever, chills, SOB, palpitations, chest pain, abdominal pain, nausea, vomitting, diarrhea, constipation, dizziness    MEDICATIONS  (STANDING):  dextrose 5%. 1000 milliLiter(s) (50 mL/Hr) IV Continuous <Continuous>  dextrose 50% Injectable 12.5 Gram(s) IV Push once  dextrose 50% Injectable 25 Gram(s) IV Push once  dextrose 50% Injectable 25 Gram(s) IV Push once  insulin glargine Injectable (LANTUS) 10 Unit(s) SubCutaneous at bedtime  insulin lispro (HumaLOG) corrective regimen sliding scale   SubCutaneous three times a day before meals  aspirin enteric coated 81 milliGRAM(s) Oral daily  enoxaparin Injectable 40 milliGRAM(s) SubCutaneous daily  isosorbide   mononitrate ER Tablet (IMDUR) 30 milliGRAM(s) Oral daily  hydrochlorothiazide 25 milliGRAM(s) Oral daily  atorvastatin 40 milliGRAM(s) Oral at bedtime  losartan 50 milliGRAM(s) Oral daily    MEDICATIONS  (PRN):  dextrose Gel 1 Dose(s) Oral once PRN Blood Glucose LESS THAN 70 milliGRAM(s)/deciLiter  glucagon  Injectable 1 milliGRAM(s) IntraMuscular once PRN Glucose <70 milliGRAM(s)/deciLiter      PHYSICAL EXAM:  GENERAL: NAD, well-groomed, well-developed  HEAD:  Atraumatic, Normocephalic  EYES: EOMI, PERRLA, conjunctiva and sclera clear  NECK: Supple, No JVD  NERVOUS SYSTEM:  Alert & Oriented X3, Good concentration  CHEST/LUNG: Clear to auscultation bilaterally; No rales, rhonchi, wheezing, or rubs  HEART: Regular rate and rhythm; No murmurs, rubs, or gallops  ABDOMEN: Soft, Nontender; Bowel sounds present  EXTREMITIES:  No peripheral edema    LABS:                        12.4   7.0   )-----------( 168      ( 14 Aug 2017 06:48 )             37.4     08-14    144  |  110<H>  |  21<H>  ----------------------------<  105<H>  3.9   |  24  |  1.23    Ca    9.0      14 Aug 2017 06:48  Phos  3.4     08-14  Mg     2.3     08-14          CAPILLARY BLOOD GLUCOSE  111 (14 Aug 2017 16:05)  107 (14 Aug 2017 11:41)  148 (14 Aug 2017 08:18)  131 (13 Aug 2017 20:35)
HPI:  82 year old Male from home lives alone, ambulates independently, with PMH Diabetes, CAD S/p stent? CABG? in Nov 2011, Obesity, OA, macular degeneration came with c/o SOB on exertion since one month ago. He reports that He become SOB walking one block,  His exertional SOB was progressively getting worse since one week, so he called his physician who told him to go to ER. Dyspnea is aggravated by lifting grocery bags and walking more then a block, relieved by sitting down. He denies Orthopnea, PND, chest pain, dizziness, recent URI, heartburn, fever. He also c/o chronic B/L leg swelling and knee pain due to Osteoarthritis. He reported that he goes to his cardiologist Dr. Rose olivera in [facility] who scheduled him for Nuclear Stress test but he cancelled that test as it was expensive. He did not know whether his cardiologist has done an Echo and EF. In ED, EKG was done which shows NS-T wave changes with occasional PVC. CXR was done "Focal opacity in the periphery of the left lower lobe along the lateral aspect of the left hemidiaphragm not well visualized on the lateral image which could represent focal scarring/atelectasis. Otherwise, clear lungs."   When seen in ED he was lying comfortably, He denies SOB currently. (11 Aug 2017 23:28)  cardio f/up noted  feels better  no new sym    Meds:    Allergies:  Allergies    No Known Allergies    Intolerances        REVIEW OF SYSTEMS:  feels better  no new sym  omar diet  no cp  CONSTITUTIONAL: No weakness, fevers or chills  EYES/ENT: No visual changes;  No vertigo or throat pain   NECK: No pain or stiffness  RESPIRATORY: No cough, wheezing, hemoptysis; No shortness of breath  CARDIOVASCULAR: No chest pain or palpitations  GASTROINTESTINAL: No abdominal or epigastric pain. No nausea, vomiting, or hematemesis; No diarrhea or constipation. No melena or hematochezia.  GENITOURINARY: No dysuria, frequency or hematuria  NEUROLOGICAL: No numbness or weakness  SKIN: No itching, burning, rashes, or lesions   All other review of systems is negative unless indicated above.    PHYSICAL EXAM:    Vital Signs Last 24 Hrs  T(C): 36.8 (13 Aug 2017 16:06), Max: 37 (13 Aug 2017 12:09)  T(F): 98.2 (13 Aug 2017 16:06), Max: 98.6 (13 Aug 2017 12:09)  HR: 61 (13 Aug 2017 16:06) (52 - 68)  BP: 127/54 (13 Aug 2017 16:06) (111/52 - 150/53)  BP(mean): --  RR: 18 (13 Aug 2017 16:06) (17 - 20)  SpO2: 97% (13 Aug 2017 16:06) (95% - 100%)    HEENT:    Neck:  [  ] Supple  [  ] Lymphadenopathy  [  ] JVD  [  ] Masses  [  ] WNL    CHEST/Respiratory: [ ] Clear to auscultation     [  ] Rales      [  ] Rhonchi      [  ] Wheezing       [  ] Chest Tenderness     [  ] WNL    Cardiovascular:  [  ]S1 S2  [  ] Reg  [  ] Irreg   [  ] No Murmurs   [  ] Murmurs  [  ] Systolic [  ] Diastolic    Gastrointestinal:  [  ] Bowel Sounds  [   ] ABD Soft  [  ] ABD Distention   [  ] No Tenderness [  ] Tenderness  [  ] Organomegaly  [  ] Guarding rigidity  [  ] No Guarding rigidity  [  ] Rebound Tenderness [  ] No Rebound Tenderness    Extremities: [  ] Edema  [  ] No Edema  [  ] Clubbing   [  ] Cyanosis  [  ] Palpable peripheral pulses                          [  ] Tender calf muscles    [  ] No Tender Calf Muscles    Neurological:  [  ] Alert  [  ] Awake  [  ] Oriented  x                              [  ] Focal weakness  [  ] No Focal Weakness    Skin:  [  ] Thrombophlebitis  [  ] Rashes  [  ] Dry  [  ] Ulcers    Ortho:  [  ] Joint Swelling  [  ] Joint erythema [  ] DJD [  ] Increased Temperature to Touch      LABS/DIAGNOSTIC TESTS                          11.7   8.2   )-----------( 185      ( 13 Aug 2017 06:26 )             36.1         08-13    142  |  108  |  19<H>  ----------------------------<  112<H>  3.9   |  26  |  1.23    Ca    8.8      13 Aug 2017 06:26  Phos  3.6     08-13  Mg     2.3     08-13        CAPILLARY BLOOD GLUCOSE  118 (13 Aug 2017 16:06)  144 (13 Aug 2017 12:09)  118 (13 Aug 2017 07:34)  109 (12 Aug 2017 21:12)            Hemoglobin A1C, Whole Blood: 6.4 % (08-12 @ 10:10)    Thyroid Stimulating Hormone, Serum: 2.79 uU/mL (08-12 @ 07:23)    CULTURES:       RADIOLOGY  CXR:    dextrose 5%. 1000 milliLiter(s) IV Continuous <Continuous>  dextrose Gel 1 Dose(s) Oral once PRN  dextrose 50% Injectable 12.5 Gram(s) IV Push once  dextrose 50% Injectable 25 Gram(s) IV Push once  dextrose 50% Injectable 25 Gram(s) IV Push once  glucagon  Injectable 1 milliGRAM(s) IntraMuscular once PRN  insulin glargine Injectable (LANTUS) 10 Unit(s) SubCutaneous at bedtime  insulin lispro (HumaLOG) corrective regimen sliding scale   SubCutaneous three times a day before meals  aspirin enteric coated 81 milliGRAM(s) Oral daily  enoxaparin Injectable 40 milliGRAM(s) SubCutaneous daily  isosorbide   mononitrate ER Tablet (IMDUR) 30 milliGRAM(s) Oral daily  hydrochlorothiazide 25 milliGRAM(s) Oral daily  atorvastatin 40 milliGRAM(s) Oral at bedtime  losartan 50 milliGRAM(s) Oral daily
Patient is a 82y old  Male who presents with a chief complaint of SOB (13 Aug 2017 21:58)      INTERVAL HPI/OVERNIGHT EVENTS:  no cp/sob    VITAL SIGNS:  T(F): 98.2 (08-15-17 @ 08:39)  HR: 59 (08-15-17 @ 08:39)  BP: 145/63 (08-15-17 @ 08:39)  RR: 16 (08-15-17 @ 08:39)  SpO2: 96% (08-15-17 @ 08:39)  Wt(kg): --  I&O's Detail          REVIEW OF SYSTEMS:    CONSTITUTIONAL:  No fevers, chills, sweats    HEENT:  Eyes:  No diplopia or blurred vision. ENT:  No earache, sore throat or runny nose.    CARDIOVASCULAR:  No pressure, squeezing, tightness, or heaviness about the chest; no palpitations.    RESPIRATORY:  Per HPI    GASTROINTESTINAL:  No abdominal pain, nausea, vomiting or diarrhea.    GENITOURINARY:  No dysuria, frequency or urgency.    NEUROLOGIC:  No paresthesias, fasciculations, seizures or weakness.    PSYCHIATRIC:  No disorder of thought or mood.      PHYSICAL EXAM:    Constitutional:no complaints  ENMT:atnc  Neck:supple  Respiratory:clear  Cardiovascular:rr  Gastrointestinal:soft, obese  Extremities:no edema  Vascular:intact  Neurological:non focal  Musculoskeletal:no edema, normal rom    MEDICATIONS  (STANDING):  dextrose 5%. 1000 milliLiter(s) (50 mL/Hr) IV Continuous <Continuous>  dextrose 50% Injectable 12.5 Gram(s) IV Push once  dextrose 50% Injectable 25 Gram(s) IV Push once  dextrose 50% Injectable 25 Gram(s) IV Push once  insulin glargine Injectable (LANTUS) 10 Unit(s) SubCutaneous at bedtime  insulin lispro (HumaLOG) corrective regimen sliding scale   SubCutaneous three times a day before meals  aspirin enteric coated 81 milliGRAM(s) Oral daily  enoxaparin Injectable 40 milliGRAM(s) SubCutaneous daily  isosorbide   mononitrate ER Tablet (IMDUR) 30 milliGRAM(s) Oral daily  hydrochlorothiazide 25 milliGRAM(s) Oral daily  atorvastatin 40 milliGRAM(s) Oral at bedtime  losartan 50 milliGRAM(s) Oral daily    MEDICATIONS  (PRN):  dextrose Gel 1 Dose(s) Oral once PRN Blood Glucose LESS THAN 70 milliGRAM(s)/deciLiter  glucagon  Injectable 1 milliGRAM(s) IntraMuscular once PRN Glucose <70 milliGRAM(s)/deciLiter      Allergies    No Known Allergies            LABS:                        12.4   7.0   )-----------( 168      ( 14 Aug 2017 06:48 )             37.4     08-14    144  |  110<H>  |  21<H>  ----------------------------<  105<H>  3.9   |  24  |  1.23    Ca    9.0      14 Aug 2017 06:48  Phos  3.4     08-14  Mg     2.3     08-14                CAPILLARY BLOOD GLUCOSE  126 (15 Aug 2017 08:39)  114 (14 Aug 2017 20:18)  111 (14 Aug 2017 16:05)  107 (14 Aug 2017 11:41)        pro-bnp -- 08-11 @ 22:01     d-dimer 208  08-11 @ 22:01  pro-bnp 549 08-11 @ 13:32     d-dimer --  08-11 @ 13:32      RADIOLOGY & ADDITIONAL TESTS:    echo:  CONCLUSIONS:  1. Normal mitral valve. Mild mitral regurgitation.  2. Calcified trileaflet aortic valve with normal opening.  3. Aortic Root: 4.0 cm.  4. Moderate left atrial enlargement.  5. Moderate concentric left ventricular hypertrophy.  6. Moderate global left ventricular dysfunction  (EF=40-45%).      NUCLEAR STRESS;  IMPRESSIONS:  * Negative ECG evidence of ischemia after IV  administration of Regadenoson.  * Myocardial Perfusion SPECT results are abnormal.  * There is a medium sized, mild to moderate defect in the  inferior wall that is partially reversible, suggestive of  infarction with moderate steffi-infarct ischemia.  There is  a small, mild defect in the anterior wall that is  reversible, suggestive of ischemia.  There is a small,  mild to moderate defect in the lateral wall that is  partially reversible, suggestive of infarction with  mild-moderate steffi-infarct ischemia.  * Post-stress resting myocardial perfusion gated SPECT  imaging was performed (LVEF = 51 %).    -------------------------------------
Patient is a 82y old  Male who presents with a chief complaint of SOB (13 Aug 2017 21:58)      INTERVAL HPI/OVERNIGHT EVENTS:  no new complaints    VITAL SIGNS:  T(F): 97.8 (08-14-17 @ 08:18)  HR: 64 (08-14-17 @ 08:18)  BP: 122/77 (08-14-17 @ 08:18)  RR: 18 (08-14-17 @ 08:18)  SpO2: 99% (08-14-17 @ 08:18)  Wt(kg): --  I&O's Detail          REVIEW OF SYSTEMS:    CONSTITUTIONAL:  No fevers, chills, sweats    HEENT:  Eyes:  No diplopia or blurred vision. ENT:  No earache, sore throat or runny nose.    CARDIOVASCULAR:  No pressure, squeezing, tightness, or heaviness about the chest; no palpitations.    RESPIRATORY:  Per HPI    GASTROINTESTINAL:  No abdominal pain, nausea, vomiting or diarrhea.    GENITOURINARY:  No dysuria, frequency or urgency.    NEUROLOGIC:  No paresthesias, fasciculations, seizures or weakness.    PSYCHIATRIC:  No disorder of thought or mood.      PHYSICAL EXAM:    Constitutional:no complaints  ENMT:atnc  Neck:supple  Respiratory:clear  Cardiovascular:rr  Gastrointestinal:soft  Extremities:no edema  Vascular:intact  Neurological:non focal  Musculoskeletal:no edema, normal rom    MEDICATIONS  (STANDING):  dextrose 5%. 1000 milliLiter(s) (50 mL/Hr) IV Continuous <Continuous>  dextrose 50% Injectable 12.5 Gram(s) IV Push once  dextrose 50% Injectable 25 Gram(s) IV Push once  dextrose 50% Injectable 25 Gram(s) IV Push once  insulin glargine Injectable (LANTUS) 10 Unit(s) SubCutaneous at bedtime  insulin lispro (HumaLOG) corrective regimen sliding scale   SubCutaneous three times a day before meals  aspirin enteric coated 81 milliGRAM(s) Oral daily  enoxaparin Injectable 40 milliGRAM(s) SubCutaneous daily  isosorbide   mononitrate ER Tablet (IMDUR) 30 milliGRAM(s) Oral daily  hydrochlorothiazide 25 milliGRAM(s) Oral daily  atorvastatin 40 milliGRAM(s) Oral at bedtime  losartan 50 milliGRAM(s) Oral daily    MEDICATIONS  (PRN):  dextrose Gel 1 Dose(s) Oral once PRN Blood Glucose LESS THAN 70 milliGRAM(s)/deciLiter  glucagon  Injectable 1 milliGRAM(s) IntraMuscular once PRN Glucose <70 milliGRAM(s)/deciLiter      Allergies    No Known Allergies    Intolerances        LABS:                        12.4   7.0   )-----------( 168      ( 14 Aug 2017 06:48 )             37.4     08-14    144  |  110<H>  |  21<H>  ----------------------------<  105<H>  3.9   |  24  |  1.23    Ca    9.0      14 Aug 2017 06:48  Phos  3.4     08-14  Mg     2.3     08-14                CAPILLARY BLOOD GLUCOSE  148 (14 Aug 2017 08:18)  131 (13 Aug 2017 20:35)  118 (13 Aug 2017 16:06)  144 (13 Aug 2017 12:09)            d-dimer 208  08-11 @ 22:01  pro-bnp 549 08-11 @ 13:32     d-dimer --  08-11 @ 13:32      RADIOLOGY & ADDITIONAL TESTS:    CXR:  EXAM:  CHEST PA & LAT                            PROCEDURE DATE:  08/11/2017          INTERPRETATION:  Frontal and lateral radiographs of the chest dated   August 11, 2017.    CLINICAL INFORMATION: Shortness of breath.    The study is correlated with a prior exam from November 28, 2011.    FINDINGS:    There is a focal opacity in the periphery of the left lower lobe along   the lateral aspect of the left hemidiaphragm which is not well visualized   on the lateral image which could represent focal scarring/atelectasis.   The right lung is grossly clear. There is no evidence of a pleural   effusion or pneumothorax. The cardiac silhouette size and mediastinal   contours are unchanged compared with prior exam. Sternotomy wires are   well aligned. Surgical clips are scattered throughout the mediastinum.   There are degenerative changes of the spine.    IMPRESSION:    Focal opacity in the periphery of the left lower lobe along the lateral   aspect of the left hemidiaphragm not well visualizedon the lateral image   which could represent focal scarring/atelectasis. Otherwise, clear lungs.          ekg;  Ventricular Rate 57 BPM    Atrial Rate 64 BPM    P-R Interval 168 ms    QRS Duration 106 ms     ms    QTc 412 ms    P Axis 18 degrees    R Axis 36 degrees    T Axis 53 degrees    Diagnosis Line   NORMAL SINUS RHYTHM  NONSPECIFIC T WAVE ABNORMALITY    echo;  CONCLUSIONS:  1. Normal mitral valve. Mild mitral regurgitation.  2. Calcified trileaflet aortic valve with normal opening.  3. Aortic Root: 4.0 cm.  4. Moderate left atrial enlargement.  5. Moderate concentric left ventricular hypertrophy.  6. Moderate global left ventricular dysfunction  (EF=40-45%).
Subjective: No chest pain or sob   	  MEDICATIONS:  MEDICATIONS  (STANDING):  dextrose 5%. 1000 milliLiter(s) (50 mL/Hr) IV Continuous <Continuous>  dextrose 50% Injectable 12.5 Gram(s) IV Push once  dextrose 50% Injectable 25 Gram(s) IV Push once  dextrose 50% Injectable 25 Gram(s) IV Push once  insulin glargine Injectable (LANTUS) 10 Unit(s) SubCutaneous at bedtime  insulin lispro (HumaLOG) corrective regimen sliding scale   SubCutaneous three times a day before meals  aspirin enteric coated 81 milliGRAM(s) Oral daily  enoxaparin Injectable 40 milliGRAM(s) SubCutaneous daily  isosorbide   mononitrate ER Tablet (IMDUR) 30 milliGRAM(s) Oral daily  hydrochlorothiazide 25 milliGRAM(s) Oral daily  atorvastatin 40 milliGRAM(s) Oral at bedtime  losartan 50 milliGRAM(s) Oral daily      LABS:	 	    CARDIAC MARKERS:                                12.4   7.0   )-----------( 168      ( 14 Aug 2017 06:48 )             37.4     08-14    144  |  110<H>  |  21<H>  ----------------------------<  105<H>  3.9   |  24  |  1.23    Ca    9.0      14 Aug 2017 06:48  Phos  3.4     08-14  Mg     2.3     08-14      proBNP:   Lipid Profile:   HgA1c:   TSH:       PHYSICAL EXAM:  T(C): 36.8 (08-15-17 @ 08:39), Max: 37 (08-14-17 @ 19:18)  HR: 59 (08-15-17 @ 08:39) (59 - 66)  BP: 145/63 (08-15-17 @ 08:39) (111/54 - 170/92)  RR: 16 (08-15-17 @ 08:39) (16 - 18)  SpO2: 96% (08-15-17 @ 08:39) (94% - 97%)  Wt(kg): --  I&O's Summary        Appearance: Normal	  HEENT:   Normal oral mucosa, PERRL, EOMI	  Lymphatic: No lymphadenopathy , no edema  Cardiovascular: Normal S1 S2, IRR No JVD, No murmurs , Peripheral pulses palpable 2+ bilaterally  Respiratory: Lungs clear to auscultation, normal effort 	  Gastrointestinal:  Soft, Non-tender, + BS	  Skin: No rashes, No ecchymoses, No cyanosis, warm to touch  Musculoskeletal: Normal range of motion, normal strength      TELEMETRY: AF with RVR	    ECG:  	  RADIOLOGY:   DIAGNOSTIC TESTING:  [ ] Echocardiogram:< from: Transthoracic Echocardiogram (08.12.17 @ 07:13) >  1. Normal mitral valve. Mild mitral regurgitation.  2. Calcified trileaflet aortic valve with normal opening.  3. Aortic Root: 4.0 cm.  4. Moderate left atrial enlargement.  5. Moderate concentric left ventricular hypertrophy.  6. Moderate global left ventricular dysfunction  (EF=40-45%).  7. Grade III diastolic dysfunction  8. Normal right atrium.  9. Normal right ventricular size and function.  10. RV systolic pressure is moderately increased (PASP 48mm  Hg).  11. There is mild tricuspid regurgitation.  12. There is mild pulmonic regurgitation.  13. Normal pericardium with no pericardial effusion.    < end of copied text >    [ ]  Catheterization:  [ ] Stress Test:  < from: Nuclear Stress Test-Pharmacologic (08.14.17 @ 09:57) >  administration of Regadenoson.  * Myocardial Perfusion SPECT results are abnormal.  * There is a medium sized, mild to moderate defect in the  inferior wall that is partially reversible, suggestive of  infarction with moderate steffi-infarct ischemia.  There is  a small, mild defect in the anterior wall that is  reversible, suggestive of ischemia.  There is a small,  mild to moderate defect in the lateral wall that is  partially reversible, suggestive of infarction with  mild-moderate steffi-infarct ischemia.  * Post-stress resting myocardial perfusion gated SPECT  imaging was performed (LVEF = 51 %).    < end of copied text >    OTHER: 	      ASSESSMENT/PLAN: 	82y Male with h/o CAD s/p CABG in 2011, HTN, HLD admitted with WHEAT found to have a new reduction in ejection fraction and abnormal NST.   -Pt. now in AF with RVR  -Would give IV lopressor now and start oral metoprolol 25mg PO BID  -Start hep gtt for elevated chads-vasc score  -Will transfer patient to [facility] for cardiac cath when medically optimized and rate controlled.     Temitope Nuñez MD  [facility]  [address] [place] [address], [id number]  [place] [address]  office: [phone]  pager: [phone]
Patient is a 82y old  Male who presents with a chief complaint of SOB (11 Aug 2017 23:28)      INTERVAL HPI/OVERNIGHT EVENTS: No overnight events. Patient denies SOB currently    MEDICATIONS  (STANDING):  dextrose 5%. 1000 milliLiter(s) (50 mL/Hr) IV Continuous <Continuous>  dextrose 50% Injectable 12.5 Gram(s) IV Push once  dextrose 50% Injectable 25 Gram(s) IV Push once  dextrose 50% Injectable 25 Gram(s) IV Push once  losartan 25 milliGRAM(s) Oral daily  insulin glargine Injectable (LANTUS) 10 Unit(s) SubCutaneous at bedtime  insulin lispro (HumaLOG) corrective regimen sliding scale   SubCutaneous three times a day before meals  aspirin enteric coated 81 milliGRAM(s) Oral daily  enoxaparin Injectable 40 milliGRAM(s) SubCutaneous daily  isosorbide   mononitrate ER Tablet (IMDUR) 30 milliGRAM(s) Oral daily  hydrochlorothiazide 25 milliGRAM(s) Oral daily  atorvastatin 40 milliGRAM(s) Oral at bedtime    MEDICATIONS  (PRN):  dextrose Gel 1 Dose(s) Oral once PRN Blood Glucose LESS THAN 70 milliGRAM(s)/deciLiter  glucagon  Injectable 1 milliGRAM(s) IntraMuscular once PRN Glucose <70 milliGRAM(s)/deciLiter      Allergies    No Known Allergies    Intolerances        REVIEW OF SYSTEMS:  CONSTITUTIONAL: No fever, weight loss, or fatigue  RESPIRATORY: No cough, wheezing, chills or hemoptysis; No shortness of breath  CARDIOVASCULAR: No chest pain, palpitations, dizziness, or leg swelling  GASTROINTESTINAL: No abdominal or epigastric pain. No nausea, vomiting, or hematemesis; No diarrhea or constipation. No melena or hematochezia.  NEUROLOGICAL: No headaches, memory loss, loss of strength, numbness, or tremors  SKIN: No itching, burning, rashes, or lesions     Vital Signs Last 24 Hrs  T(C): 37 (12 Aug 2017 07:38), Max: 37.2 (12 Aug 2017 04:38)  T(F): 98.6 (12 Aug 2017 07:38), Max: 99 (12 Aug 2017 04:38)  HR: 60 (12 Aug 2017 07:40) (54 - 66)  BP: 186/77 (12 Aug 2017 07:40) (132/60 - 186/77)  BP(mean): --  RR: 16 (12 Aug 2017 07:38) (16 - 18)  SpO2: 97% (12 Aug 2017 07:38) (97% - 99%)    PHYSICAL EXAM:  GENERAL: NAD, obese  HEAD:  Atraumatic, Normocephalic  EYES: EOMI, PERRLA, conjunctiva and sclera clear  NECK: Supple, No JVD, Normal thyroid  CHEST/LUNG: Clear to percussion bilaterally; No rales, rhonchi, wheezing, or rubs  HEART: Regular rate and rhythm; No murmurs, rubs, or gallops  ABDOMEN: Soft, Nontender, Nondistended; Bowel sounds present  NERVOUS SYSTEM:  Alert & Oriented X3, Good concentration; Motor Strength 5/5 B/L   EXTREMITIES:  2+ B/L Pedal edema. Peripheral Pulses, No clubbing, cyanosis.      LABS:                        12.5   7.7   )-----------( 172      ( 12 Aug 2017 07:23 )             38.0     08-12    142  |  108  |  18  ----------------------------<  107<H>  4.0   |  25  |  1.28    Ca    9.3      12 Aug 2017 07:23  Phos  3.3     08-12  Mg     2.2     08-12    TPro  7.2  /  Alb  3.6  /  TBili  0.5  /  DBili  x   /  AST  24  /  ALT  20  /  AlkPhos  74  08-11      Urinalysis Basic - ( 11 Aug 2017 15:34 )    Color: Yellow / Appearance: Clear / S.010 / pH: x  Gluc: x / Ketone: Negative  / Bili: Negative / Urobili: Negative   Blood: x / Protein: Negative / Nitrite: Negative   Leuk Esterase: Small / RBC: Negative /HPF / WBC 3-5 /HPF   Sq Epi: x / Non Sq Epi: Negative / Bacteria: Negative /HPF      CAPILLARY BLOOD GLUCOSE  118 (12 Aug 2017 07:38)  108 (11 Aug 2017 23:01)  144 (11 Aug 2017 12:21)          RADIOLOGY & ADDITIONAL TESTS:    Imaging Personally Reviewed:  [ ] YES  [ ] NO    Consultant(s) Notes Reviewed:  [ ] YES  [ ] NO
[Cervical Pap Smear] : cervical Pap smear
[Liquid Base] : liquid base